# Patient Record
Sex: FEMALE | Race: WHITE | NOT HISPANIC OR LATINO | Employment: OTHER | URBAN - METROPOLITAN AREA
[De-identification: names, ages, dates, MRNs, and addresses within clinical notes are randomized per-mention and may not be internally consistent; named-entity substitution may affect disease eponyms.]

---

## 2017-01-09 ENCOUNTER — TELEPHONE (OUTPATIENT)
Dept: MEDICAL GROUP | Facility: CLINIC | Age: 62
End: 2017-01-09

## 2017-01-09 DIAGNOSIS — N39.0 URINARY TRACT INFECTION WITHOUT HEMATURIA, SITE UNSPECIFIED: ICD-10-CM

## 2017-01-09 NOTE — TELEPHONE ENCOUNTER
Patient at [facility] asking for an order for UA,  Patient was advise that order will be placed in between patient and will be notified when order is in system.  Order already in, please sign.  Thank you

## 2017-01-10 ENCOUNTER — HOSPITAL ENCOUNTER (OUTPATIENT)
Facility: MEDICAL CENTER | Age: 62
End: 2017-01-10
Attending: INTERNAL MEDICINE
Payer: COMMERCIAL

## 2017-01-10 DIAGNOSIS — N39.0 URINARY TRACT INFECTION WITHOUT HEMATURIA, SITE UNSPECIFIED: ICD-10-CM

## 2017-01-10 LAB
APPEARANCE UR: ABNORMAL
BACTERIA #/AREA URNS HPF: ABNORMAL /HPF
BILIRUB UR QL STRIP.AUTO: NEGATIVE
COLOR UR AUTO: COLORLESS
CULTURE IF INDICATED INDCX: YES UA CULTURE
EPITHELIAL CELLS 1715: ABNORMAL /HPF
GLUCOSE UR STRIP.AUTO-MCNC: NEGATIVE MG/DL
KETONES UR STRIP.AUTO-MCNC: NEGATIVE MG/DL
LEUKOCYTE ESTERASE UR QL STRIP.AUTO: ABNORMAL
MICRO URNS: ABNORMAL
NITRITE UR QL STRIP.AUTO: POSITIVE
PH UR: 7 [PH]
PROT UR QL STRIP: NEGATIVE MG/DL
RBC #/AREA URNS HPF: ABNORMAL /HPF
RBC UR QL AUTO: NEGATIVE
SP GR UR STRIP.AUTO: 1.01
WBC #/AREA URNS HPF: ABNORMAL /HPF

## 2017-01-10 PROCEDURE — 81001 URINALYSIS AUTO W/SCOPE: CPT

## 2017-01-10 PROCEDURE — 87077 CULTURE AEROBIC IDENTIFY: CPT

## 2017-01-10 PROCEDURE — 87186 SC STD MICRODIL/AGAR DIL: CPT

## 2017-01-10 PROCEDURE — 87086 URINE CULTURE/COLONY COUNT: CPT

## 2017-01-11 ENCOUNTER — TELEPHONE (OUTPATIENT)
Dept: MEDICAL GROUP | Facility: CLINIC | Age: 62
End: 2017-01-11

## 2017-01-11 DIAGNOSIS — N39.0 URINARY TRACT INFECTION, SITE UNSPECIFIED: ICD-10-CM

## 2017-01-11 RX ORDER — NITROFURANTOIN 25; 75 MG/1; MG/1
100 CAPSULE ORAL 2 TIMES DAILY
Qty: 10 CAP | Refills: 0 | Status: SHIPPED | OUTPATIENT
Start: 2017-01-11 | End: 2017-04-24

## 2017-01-11 RX ORDER — NITROFURANTOIN 25; 75 MG/1; MG/1
100 CAPSULE ORAL 2 TIMES DAILY
Qty: 10 CAP | Refills: 0 | Status: SHIPPED | OUTPATIENT
Start: 2017-01-11 | End: 2017-01-11 | Stop reason: SDUPTHER

## 2017-01-11 NOTE — TELEPHONE ENCOUNTER
Patient notified, patient asked to please re-send Rx to her new pharmacy already in system, [facility] in [place].  Thank you!

## 2017-01-11 NOTE — TELEPHONE ENCOUNTER
----- Message from Tomás Salazar D.O. sent at 1/11/2017  8:07 AM PST -----  Viktoria:  Please call Agatha tell her she has a likely UTI antibiotics have been sent to her pharmacy!  Regards, Tomás Salazar, DO

## 2017-01-12 LAB
BACTERIA UR CULT: ABNORMAL
SIGNIFICANT IND 70042: ABNORMAL
SOURCE SOURCE: ABNORMAL

## 2017-02-01 RX ORDER — ENALAPRIL MALEATE 20 MG/1
20 TABLET ORAL DAILY
Qty: 30 TAB | Refills: 11 | Status: SHIPPED | OUTPATIENT
Start: 2017-02-01 | End: 2017-02-08 | Stop reason: SDUPTHER

## 2017-02-08 ENCOUNTER — TELEPHONE (OUTPATIENT)
Dept: MEDICAL GROUP | Facility: CLINIC | Age: 62
End: 2017-02-08

## 2017-02-08 RX ORDER — ENALAPRIL MALEATE 20 MG/1
20 TABLET ORAL 2 TIMES DAILY
Qty: 180 TAB | Refills: 3 | Status: SHIPPED | OUTPATIENT
Start: 2017-02-08 | End: 2018-01-11 | Stop reason: SDUPTHER

## 2017-02-08 NOTE — TELEPHONE ENCOUNTER
1. Caller Name: Agatha Brocato Johnson                                          Call Back Number: [phone] (home)        Patient approves a detailed voicemail message: yes    Pt states she was recently prescribed enalapril 20 mg once a day to her local [email or web address] pharmacy. There were a number of things wrong with this. First she would like a 90 day supplies sent to her [facility] in pharmacy instead as it is cheaper. Also, she was not inform of any sig changes. She states she has been taking this medication for quite some time and would like to know if it needs to be corrected or if she is supposed to be taking it less. Please advise    Pt has also changed her pharmacy to [facility] for local scripts as she has been having issues with [facility]. Updated in chart.

## 2017-02-08 NOTE — TELEPHONE ENCOUNTER
Telephone call returned, prescription corrected to 20 mg twice a day #180 and sent the patient's mail-order pharmacy.

## 2017-02-08 NOTE — TELEPHONE ENCOUNTER
"Pharmacy is questioning the enalapril stating \"pt was hoping this to be BID dosing please advise\". Fax received from [facility] pharmacy. Thank you   "

## 2017-02-13 ENCOUNTER — OFFICE VISIT (OUTPATIENT)
Dept: MEDICAL GROUP | Facility: CLINIC | Age: 62
End: 2017-02-13
Payer: COMMERCIAL

## 2017-02-13 VITALS
TEMPERATURE: 97.5 F | OXYGEN SATURATION: 99 % | HEIGHT: 64 IN | WEIGHT: 153 LBS | BODY MASS INDEX: 26.12 KG/M2 | DIASTOLIC BLOOD PRESSURE: 80 MMHG | SYSTOLIC BLOOD PRESSURE: 130 MMHG | RESPIRATION RATE: 16 BRPM | HEART RATE: 72 BPM

## 2017-02-13 DIAGNOSIS — I10 ESSENTIAL HYPERTENSION: ICD-10-CM

## 2017-02-13 DIAGNOSIS — J45.20 RAD (REACTIVE AIRWAY DISEASE), MILD INTERMITTENT, UNCOMPLICATED: ICD-10-CM

## 2017-02-13 DIAGNOSIS — N95.9 MENOPAUSAL DISORDER: ICD-10-CM

## 2017-02-13 DIAGNOSIS — Z12.11 SCREEN FOR COLON CANCER: ICD-10-CM

## 2017-02-13 DIAGNOSIS — F32.A DEPRESSION, UNSPECIFIED DEPRESSION TYPE: ICD-10-CM

## 2017-02-13 DIAGNOSIS — Z00.00 ANNUAL PHYSICAL EXAM: ICD-10-CM

## 2017-02-13 DIAGNOSIS — E78.5 DYSLIPIDEMIA: ICD-10-CM

## 2017-02-13 PROCEDURE — 99396 PREV VISIT EST AGE 40-64: CPT | Performed by: INTERNAL MEDICINE

## 2017-02-13 RX ORDER — SIMVASTATIN 20 MG
20 TABLET ORAL EVERY EVENING
Qty: 90 TAB | Refills: 3 | Status: SHIPPED | OUTPATIENT
Start: 2017-02-13 | End: 2017-03-07 | Stop reason: SDUPTHER

## 2017-02-13 RX ORDER — HYDROCHLOROTHIAZIDE 12.5 MG/1
12.5 TABLET ORAL DAILY
Qty: 90 TAB | Refills: 3 | Status: SHIPPED | OUTPATIENT
Start: 2017-02-13 | End: 2017-03-07 | Stop reason: SDUPTHER

## 2017-02-13 RX ORDER — FLUOXETINE 10 MG/1
10 CAPSULE ORAL DAILY
Qty: 30 CAP | Refills: 3 | Status: SHIPPED | OUTPATIENT
Start: 2017-02-13 | End: 2017-03-07 | Stop reason: SDUPTHER

## 2017-02-13 RX ORDER — AMLODIPINE BESYLATE 2.5 MG/1
2.5 TABLET ORAL DAILY
Qty: 90 TAB | Refills: 3 | Status: SHIPPED | OUTPATIENT
Start: 2017-02-13 | End: 2017-03-07 | Stop reason: SDUPTHER

## 2017-02-13 RX ORDER — ALBUTEROL SULFATE 90 UG/1
2 AEROSOL, METERED RESPIRATORY (INHALATION) EVERY 6 HOURS PRN
Qty: 8.5 G | Refills: 3 | Status: SHIPPED | OUTPATIENT
Start: 2017-02-13 | End: 2017-03-07 | Stop reason: SDUPTHER

## 2017-02-13 ASSESSMENT — PATIENT HEALTH QUESTIONNAIRE - PHQ9: CLINICAL INTERPRETATION OF PHQ2 SCORE: 0

## 2017-02-13 NOTE — MR AVS SNAPSHOT
"        Agatha Haney Lam   2017 8:00 AM   Office Visit   MRN: [id number]    Department:  [facility]   Dept Phone:  [phone]    Description:  Female : 1955   Provider:  Tomás Salazar D.O.           Reason for Visit     Annual Exam Annual no PAP; prescription renewal      Allergies as of 2017     No Known Allergies      You were diagnosed with     Screen for colon cancer   [880183]       Dyslipidemia   [572460]       Essential hypertension   [7543919]       Depression, unspecified depression type   [8084708]       RAD (reactive airway disease), mild intermittent, uncomplicated   [8410081]       Menopausal disorder   [628109]         Vital Signs     Blood Pressure Pulse Temperature Respirations Height Weight    130/80 mmHg 72 36.4 °C (97.5 °F) 16 1.626 m (5' 4\") 69.4 kg (153 lb)    Body Mass Index Oxygen Saturation Breastfeeding? Smoking Status          26.25 kg/m2 99% No Former Smoker        Basic Information     Date Of Birth Sex Race Ethnicity Preferred Language    1955 Female White Non- English      Problem List              ICD-10-CM Priority Class Noted - Resolved    White coat hypertension R03.0   2016 - Present    Essential hypertension I10   2016 - Present    Dyslipidemia E78.5   2016 - Present    Cervicalgia M54.2   2016 - Present    Carpal tunnel syndrome on right G56.01   2016 - Present      Health Maintenance        Date Due Completion Dates    IMM DTaP/Tdap/Td Vaccine (1 - Tdap) 1975 ---    COLONOSCOPY 2006 ---    IMM ZOSTER VACCINE 2016 ---    MAMMOGRAM 2017, 2016, 2016, 2016 (Done)    Override on 2016: Done (in mediA)    PAP SMEAR 2019            Current Immunizations     Influenza Vaccine Quad Inj (Pf) 10/15/2016  8:20 AM      Below and/or attached are the medications your provider expects you to take. Review all of your home medications and newly ordered medications " with your provider and/or pharmacist. Follow medication instructions as directed by your provider and/or pharmacist. Please keep your medication list with you and share with your provider. Update the information when medications are discontinued, doses are changed, or new medications (including over-the-counter products) are added; and carry medication information at all times in the event of emergency situations     Allergies:  No Known Allergies          Medications  Valid as of: February 13, 2017 -  9:29 AM    Generic Name Brand Name Tablet Size Instructions for use    albuterol (PROVENTIL) 2.5 mg/0.5 mL NEBU (Nebu Soln) PROVENTIL 2.5 mg/0.5 mL by Nebulization route ONCE (RT).        Albuterol Sulfate (Aero Soln) albuterol 108 (90 BASE) MCG/ACT Inhale 2 Puffs by mouth every 6 hours as needed for Shortness of Breath.        AmLODIPine Besylate (Tab) NORVASC 2.5 MG Take 1 Tab by mouth every day.        Ascorbic Acid (Tab) ascorbic acid 500 MG Take 500 mg by mouth every day.        Calcium Carbonate Antacid (Chew Tab) TUMS 500 MG Take 500 mg by mouth 2 times a day.        Cholecalciferol   Take  by mouth.        Cranberry   Take  by mouth.        Enalapril Maleate (Tab) VASOTEC 20 MG Take 1 Tab by mouth 2 times a day.        FLUoxetine HCl (Cap) PROZAC 10 MG Take 1 Cap by mouth every day.        Gabapentin (Cap) NEURONTIN 300 MG Take 2 Caps by mouth every 6 hours.        HydroCHLOROthiazide (Tab) HYDRODIURIL 12.5 MG Take 1 Tab by mouth every day.        Mometasone Furo-Formoterol Fum (Aerosol) Mometasone Furo-Formoterol Fum 100-5 MCG/ACT Inhale 2 Puffs by mouth 2 times a day as needed.        Nitrofurantoin Monohyd Macro (Cap) MACROBID 100 MG Take 1 Cap by mouth 2 times a day.        NON SPECIFIED   Indications: multivitamin        Oxycodone-Acetaminophen (Tab) PERCOCET 5-325 MG Take 1-2 Tabs by mouth every four hours as needed.        Simvastatin (Tab) ZOCOR 20 MG Take 1 Tab by mouth every evening.        .                Medicines prescribed today were sent to:     [facility] - [place], [place] - [address] [address] [facility]    [address] [address]    Phone: [phone] Fax: [phone]    Open 24 Hours?: No    [facility] - [place], [place] - [address] [place] [address]    Phone: [phone] Fax: [phone]    Open 24 Hours?: No      Medication refill instructions:       If your prescription bottle indicates you have medication refills left, it is not necessary to call your provider’s office. Please contact your pharmacy and they will refill your medication.    If your prescription bottle indicates you do not have any refills left, you may request refills at any time through one of the following ways: The online [employer] system (except Urgent Care), by calling your provider’s office, or by asking your pharmacy to contact your provider’s office with a refill request. Medication refills are processed only during regular business hours and may not be available until the next business day. Your provider may request additional information or to have a follow-up visit with you prior to refilling your medication.   *Please Note: Medication refills are assigned a new Rx number when refilled electronically. Your pharmacy may indicate that no refills were authorized even though a new prescription for the same medication is available at the pharmacy. Please request the medicine by name with the pharmacy before contacting your provider for a refill.        Your To Do List     Future Labs/Procedures Complete By Expires    CBC WITH DIFFERENTIAL  As directed 2/14/2018    COMP METABOLIC PANEL  As directed 2/13/2018    CT-CARDIAC SCORING  As directed 2/13/2018    LIPID PROFILE  As directed 2/14/2018      Referral     A referral request has been sent to our patient care coordination department. Please allow 3-5 business days for us to process this request and contact you  either by phone or mail. If you do not hear from us by the 5th business day, please call us at [phone].           [employer] Access Code: Activation code not generated  Current MobileVeda Status: Active

## 2017-02-14 NOTE — PROGRESS NOTES
CC: Agatha Fritz is a 61 y.o. female is suffering from   Chief Complaint   Patient presents with   • Annual Exam     Annual no PAP; prescription renewal         SUBJECTIVE:  1. Annual physical exam  Patient's here for annual physical examination denies any significant medical changes over the past one year. Patient is otherwise in very good physical condition.     2. Dyslipidemia  Patient with a history of dyslipidemia will order additional testing, I've suggested patient undergo cardiac calcium scoring.     3. Essential hypertension  Patient with a history of essential hypertension refills of medication ordered    4. Depression, unspecified depression type  Patient has done well on medication, Prozac reordered.     5. RAD (reactive airway disease), mild intermittent, uncomplicated  Patient with a history of reactive airway disease which is intermittent medications also refilled    6. Menopausal disorder  Mammogram ordered  - MA-SCREEN MAMMO W/CAD-BILAT  - MA-SCREEN MAMMO W/CAD-BILAT    7. Screen for colon cancer  Screening for colon cancer is also been ordered        Past social, family, history:   Social History   Substance Use Topics   • Smoking status: Former Smoker -- 1.00 packs/day for 15 years     Types: Cigarettes     Quit date: 01/01/1990   • Smokeless tobacco: Never Used   • Alcohol Use: No         MEDICATIONS:    Current outpatient prescriptions:   •  amlodipine (NORVASC) 2.5 MG Tab, Take 1 Tab by mouth every day., Disp: 90 Tab, Rfl: 3  •  simvastatin (ZOCOR) 20 MG Tab, Take 1 Tab by mouth every evening., Disp: 90 Tab, Rfl: 3  •  hydrochlorothiazide (HYDRODIURIL) 12.5 MG tablet, Take 1 Tab by mouth every day., Disp: 90 Tab, Rfl: 3  •  fluoxetine (PROZAC) 10 MG Cap, Take 1 Cap by mouth every day., Disp: 30 Cap, Rfl: 3  •  albuterol 108 (90 BASE) MCG/ACT Aero Soln inhalation aerosol, Inhale 2 Puffs by mouth every 6 hours as needed for Shortness of Breath., Disp: 8.5 g, Rfl: 3  •  Mometasone  Furo-Formoterol Fum (DULERA) 100-5 MCG/ACT Aerosol, Inhale 2 Puffs by mouth 2 times a day as needed., Disp: 1 Inhaler, Rfl: 3  •  enalapril (VASOTEC) 20 MG tablet, Take 1 Tab by mouth 2 times a day., Disp: 180 Tab, Rfl: 3  •  calcium carbonate (TUMS) 500 MG Chew Tab, Take 500 mg by mouth 2 times a day., Disp: , Rfl:   •  gabapentin (NEURONTIN) 300 MG Cap, Take 2 Caps by mouth every 6 hours., Disp: 720 Cap, Rfl: 3  •  CRANBERRY EXTRACT PO, Take  by mouth., Disp: , Rfl:   •  albuterol (PROVENTIL) 2.5 mg/0.5 mL NEBU, by Nebulization route ONCE (RT)., Disp: , Rfl:   •  nitrofurantoin monohydr macro (MACROBID) 100 MG Cap, Take 1 Cap by mouth 2 times a day. (Patient not taking: Reported on 2/13/2017), Disp: 10 Cap, Rfl: 0  •  NON SPECIFIED, Indications: multivitamin, Disp: , Rfl:   •  oxycodone-acetaminophen (PERCOCET) 5-325 MG Tab, Take 1-2 Tabs by mouth every four hours as needed. (Patient not taking: Reported on 2/13/2017), Disp: 25 Tab, Rfl: 0  •  ascorbic acid (ASCORBIC ACID) 500 MG Tab, Take 500 mg by mouth every day., Disp: , Rfl:   •  Cholecalciferol (VITAMIN D-3 PO), Take  by mouth., Disp: , Rfl:     PROBLEMS:  Patient Active Problem List    Diagnosis Date Noted   • Carpal tunnel syndrome on right 11/12/2016   • Cervicalgia 09/14/2016   • White coat hypertension 04/04/2016   • Essential hypertension 04/04/2016   • Dyslipidemia 04/04/2016       REVIEW OF SYSTEMS:  General: Patient denies any problems with nausea vomiting fever chills chest pain or tightness.  Head:  No history of strokes seizures severe head trauma or loss of consciousness.   HEENT: No history of any significant vision loss, hearing loss, changes in sense of smell hoarseness  Heart: No chest pain tightness squeezing.   Lungs: No recurrent asthma bronchitis pneumonia.   Gastrointestinal: No history of black or bloody stools or  Constipation colonoscopy was done.  Genitourinary:  No increased frequency urgency pain and burning with  "urination  Musculoskeletal: No joint pain or discomfort.   Skin: No skin changes      PHYSICAL EXAM   Constitutional: Alert, cooperative, not in acute distress.  Cardiovascular:  Rate Rhythm is regular without murmurs rubs clicks.     Thorax & Lungs: Clear to auscultation, no wheezing, rhonchi, or rales  HENT: Normocephalic, Atraumatic.  Eyes: PERRLA, EOMI, Conjunctiva normal.   Neck: Trachia is midline no swelling of the thyroid.   Lymphatic: No lymphadenopathy noted.   Abdomin: Soft non-tender, no rebound, no guarding.   Neurologic: Alert & oriented x 3, cranial nerves II through XII are intact, Normal motor function, Normal sensory function, No focal deficits noted.   Psychiatric: Affect normal, Judgment normal, Mood normal.     VITAL SIGNS:/80 mmHg  Pulse 72  Temp(Src) 36.4 °C (97.5 °F)  Resp 16  Ht 1.626 m (5' 4\")  Wt 69.4 kg (153 lb)  BMI 26.25 kg/m2  SpO2 99%  Breastfeeding? No    Labs: Reviewed    Assessment:                                                     Plan:    1. Annual physical exam  Patient in very good physical condition no change in medical therapy    2. Dyslipidemia  CT cardiac scoring ordered patient is to continue on Zocor 20 mg  - CT-CARDIAC SCORING; Future  - simvastatin (ZOCOR) 20 MG Tab; Take 1 Tab by mouth every evening.  Dispense: 90 Tab; Refill: 3  - CBC WITH DIFFERENTIAL; Future  - LIPID PROFILE; Future    3. Essential hypertension  Continue Norvasc hydro-chlorothiazide  - amlodipine (NORVASC) 2.5 MG Tab; Take 1 Tab by mouth every day.  Dispense: 90 Tab; Refill: 3  - hydrochlorothiazide (HYDRODIURIL) 12.5 MG tablet; Take 1 Tab by mouth every day.  Dispense: 90 Tab; Refill: 3    4. Depression, unspecified depression type  Continue Prozac  - fluoxetine (PROZAC) 10 MG Cap; Take 1 Cap by mouth every day.  Dispense: 30 Cap; Refill: 3    5. RAD (reactive airway disease), mild intermittent, uncomplicated  Medication refilled  - albuterol 108 (90 BASE) MCG/ACT Aero Soln " inhalation aerosol; Inhale 2 Puffs by mouth every 6 hours as needed for Shortness of Breath.  Dispense: 8.5 g; Refill: 3  - Mometasone Furo-Formoterol Fum (DULERA) 100-5 MCG/ACT Aerosol; Inhale 2 Puffs by mouth 2 times a day as needed.  Dispense: 1 Inhaler; Refill: 3  - COMP METABOLIC PANEL; Future    6. Menopausal disorder  Referral written to obstetrics  - REFERRAL TO OB/GYN  - MA-SCREEN MAMMO W/CAD-BILAT; Standing  - MA-SCREEN MAMMO W/CAD-BILAT    7. Screen for colon cancer  Referral written to gastroenterology  - REFERRAL TO GI FOR COLONOSCOPY

## 2017-02-23 ENCOUNTER — HOSPITAL ENCOUNTER (OUTPATIENT)
Dept: RADIOLOGY | Facility: MEDICAL CENTER | Age: 62
End: 2017-02-23
Attending: INTERNAL MEDICINE
Payer: COMMERCIAL

## 2017-02-23 DIAGNOSIS — E78.5 DYSLIPIDEMIA: ICD-10-CM

## 2017-02-23 PROCEDURE — 4410556 CT-CARDIAC SCORING

## 2017-03-07 DIAGNOSIS — I10 ESSENTIAL HYPERTENSION: ICD-10-CM

## 2017-03-07 DIAGNOSIS — F32.A DEPRESSION, UNSPECIFIED DEPRESSION TYPE: ICD-10-CM

## 2017-03-07 DIAGNOSIS — E78.5 DYSLIPIDEMIA: ICD-10-CM

## 2017-03-07 DIAGNOSIS — J45.20 RAD (REACTIVE AIRWAY DISEASE), MILD INTERMITTENT, UNCOMPLICATED: ICD-10-CM

## 2017-03-07 RX ORDER — HYDROCHLOROTHIAZIDE 12.5 MG/1
12.5 TABLET ORAL DAILY
Qty: 90 TAB | Refills: 3 | Status: SHIPPED | OUTPATIENT
Start: 2017-03-07 | End: 2018-07-26 | Stop reason: SDUPTHER

## 2017-03-07 RX ORDER — ALBUTEROL SULFATE 90 UG/1
2 AEROSOL, METERED RESPIRATORY (INHALATION) EVERY 6 HOURS PRN
Qty: 8.5 G | Refills: 3 | Status: SHIPPED | OUTPATIENT
Start: 2017-03-07 | End: 2019-01-30 | Stop reason: SDUPTHER

## 2017-03-07 RX ORDER — SIMVASTATIN 20 MG
20 TABLET ORAL EVERY EVENING
Qty: 90 TAB | Refills: 3 | Status: SHIPPED | OUTPATIENT
Start: 2017-03-07 | End: 2017-06-01 | Stop reason: SDUPTHER

## 2017-03-07 RX ORDER — FLUOXETINE 10 MG/1
10 CAPSULE ORAL DAILY
Qty: 30 CAP | Refills: 3 | Status: SHIPPED | OUTPATIENT
Start: 2017-03-07 | End: 2018-02-14 | Stop reason: SDUPTHER

## 2017-03-07 RX ORDER — AMLODIPINE BESYLATE 2.5 MG/1
2.5 TABLET ORAL DAILY
Qty: 90 TAB | Refills: 3 | Status: SHIPPED | OUTPATIENT
Start: 2017-03-07 | End: 2017-05-23 | Stop reason: SDUPTHER

## 2017-03-09 DIAGNOSIS — Z12.11 SCREEN FOR COLON CANCER: ICD-10-CM

## 2017-04-17 ENCOUNTER — HOSPITAL ENCOUNTER (OUTPATIENT)
Dept: RADIOLOGY | Facility: MEDICAL CENTER | Age: 62
End: 2017-04-17
Attending: INTERNAL MEDICINE
Payer: COMMERCIAL

## 2017-04-17 DIAGNOSIS — Z12.31 VISIT FOR SCREENING MAMMOGRAM: ICD-10-CM

## 2017-04-17 PROCEDURE — 77063 BREAST TOMOSYNTHESIS BI: CPT

## 2017-04-18 ENCOUNTER — GYNECOLOGY VISIT (OUTPATIENT)
Dept: OBGYN | Facility: CLINIC | Age: 62
End: 2017-04-18
Payer: COMMERCIAL

## 2017-04-18 ENCOUNTER — HOSPITAL ENCOUNTER (OUTPATIENT)
Facility: MEDICAL CENTER | Age: 62
End: 2017-04-18
Attending: OBSTETRICS & GYNECOLOGY
Payer: COMMERCIAL

## 2017-04-18 VITALS
WEIGHT: 163.4 LBS | BODY MASS INDEX: 27.9 KG/M2 | HEIGHT: 64 IN | SYSTOLIC BLOOD PRESSURE: 124 MMHG | DIASTOLIC BLOOD PRESSURE: 76 MMHG

## 2017-04-18 DIAGNOSIS — Z11.51 SCREENING FOR HUMAN PAPILLOMAVIRUS: ICD-10-CM

## 2017-04-18 DIAGNOSIS — Z12.4 SCREENING FOR MALIGNANT NEOPLASM OF CERVIX: ICD-10-CM

## 2017-04-18 DIAGNOSIS — Z01.419 WELL FEMALE EXAM WITH ROUTINE GYNECOLOGICAL EXAM: ICD-10-CM

## 2017-04-18 PROCEDURE — 88175 CYTOPATH C/V AUTO FLUID REDO: CPT

## 2017-04-18 PROCEDURE — 99396 PREV VISIT EST AGE 40-64: CPT | Performed by: OBSTETRICS & GYNECOLOGY

## 2017-04-18 PROCEDURE — 87624 HPV HI-RISK TYP POOLED RSLT: CPT

## 2017-04-18 NOTE — PROGRESS NOTES
" Agatha Fritz61 y.o.  female presents for Annual Well Woman Exam.   Patient is currently without complaints. She also denies pelvic pain or pressure patient denies any vaginal bleeding, she states she occasionally has hot flashes. She occasionally has dyspareunia which she uses KY jelly with good effect.      ROS: Patient is feeling well. No dyspnea or chest pain on exertion. No Abdominal pain, change in bowel habits, black or bloody stools. No urinary sx. GYN ROS:no vaginal bleeding, no discharge or pelvic pain, no hot flashes. Denies breast tenderness, mass, discharge, changes in size or contour, or abnormal cyclic discomfort. No neurological complaints.  Past Medical History   Diagnosis Date   • Bladder infection    • Dyslipidemia 2016   • Urinary bladder disorder      \"prone to UTI's\"   • Asthma      seasonal-uses inhalers in the winter   • Bronchitis    • Pain 2016     right hand   • Psychiatric problem 2016     depression   • Arthritis      neck, bilat knees   • Hypertension    • White coat hypertension 2016   • Essential hypertension 2016     None  Allergy:   Review of patient's allergies indicates no known allergies.    LMP:       No LMP recorded. Patient is postmenopausal. .     Menstrual History: postmenopausal  Contraceptive Method:none    Pap history, the patient denies abnormal Pap smears and denies   sexually transmitted diseases    Mammo: yesterday     Objective : The patient appears well, alert and oriented x 3, in no acute distress.  Blood pressure 124/76, height 1.626 m (5' 4\"), weight 74.118 kg (163 lb 6.4 oz), not currently breastfeeding.  HEENT Exam: EOMI, TARIQ, no adenopathy or thyromegaly.  Lungs: Clear to Auscultation   Cor: S1 and S2 normal, no murmurs, or rubs   Abdomen: Soft without tenderness, guarding mass or organomegaly.  Extremities: No edema, pulses equal  Neurological: Normal No focal signs  Breast Exam:Inspection negative. No nipple " discharge or bleeding. No masses or nodularity palpable  Pelvic: External genitalia,urethral meatus, urethra, bladder and vagina normal. Cervix, uterus and adnexa intact and normal.  Anus and perineum normal. Bimanual and rectovaginal without masses or tenderness.    Lab:No results found for this or any previous visit (from the past 336 hour(s)).    Assessment:  well woman    Plan:mammogram  pap smear  Self Breast Exams  Contraception:none  Follow-up in 2 years

## 2017-04-18 NOTE — MR AVS SNAPSHOT
"        Agatha Fritz   2017 3:30 PM   Gynecology Visit   MRN: [id number]    Department:  [facility]   Dept Phone:  [phone]    Description:  Female : 1955   Provider:  Bhavna Rich M.D.           Reason for Visit     Gynecologic Exam           Allergies as of 2017     No Known Allergies      You were diagnosed with     Well female exam with routine gynecological exam   [668640]       Screening for malignant neoplasm of cervix   [130257]       Screening for human papillomavirus   [625461]         Vital Signs     Blood Pressure Height Weight Body Mass Index Breastfeeding? Smoking Status    124/76 mmHg 1.626 m (5' 4\") 74.118 kg (163 lb 6.4 oz) 28.03 kg/m2 No Former Smoker      Basic Information     Date Of Birth Sex Race Ethnicity Preferred Language    1955 Female White Non- English      Problem List              ICD-10-CM Priority Class Noted - Resolved    White coat hypertension R03.0   2016 - Present    Essential hypertension I10   2016 - Present    Dyslipidemia E78.5   2016 - Present    Cervicalgia M54.2   2016 - Present    Carpal tunnel syndrome on right G56.01   2016 - Present      Health Maintenance        Date Due Completion Dates    IMM DTaP/Tdap/Td Vaccine (1 - Tdap) 1975 ---    COLONOSCOPY 2006 ---    IMM ZOSTER VACCINE 2016 ---    MAMMOGRAM 2018, 2016, 2016 (Done)    Override on 2016: Done (in mediA)    PAP SMEAR 2019            Current Immunizations     Influenza Vaccine Quad Inj (Pf) 10/15/2016  8:20 AM      Below and/or attached are the medications your provider expects you to take. Review all of your home medications and newly ordered medications with your provider and/or pharmacist. Follow medication instructions as directed by your provider and/or pharmacist. Please keep your medication list with you and share with your provider. Update the information when " medications are discontinued, doses are changed, or new medications (including over-the-counter products) are added; and carry medication information at all times in the event of emergency situations     Allergies:  No Known Allergies          Medications  Valid as of: April 18, 2017 -  5:08 PM    Generic Name Brand Name Tablet Size Instructions for use    albuterol (PROVENTIL) 2.5 mg/0.5 mL NEBU (Nebu Soln) PROVENTIL 2.5 mg/0.5 mL by Nebulization route ONCE (RT).        Albuterol Sulfate (Aero Soln) albuterol 108 (90 BASE) MCG/ACT Inhale 2 Puffs by mouth every 6 hours as needed for Shortness of Breath.        AmLODIPine Besylate (Tab) NORVASC 2.5 MG Take 1 Tab by mouth every day.        Ascorbic Acid (Tab) ascorbic acid 500 MG Take 500 mg by mouth every day.        Calcium Carbonate Antacid (Chew Tab) TUMS 500 MG Take 500 mg by mouth 2 times a day.        Cholecalciferol   Take  by mouth.        Cranberry   Take  by mouth.        Enalapril Maleate (Tab) VASOTEC 20 MG Take 1 Tab by mouth 2 times a day.        FLUoxetine HCl (Cap) PROZAC 10 MG Take 1 Cap by mouth every day.        Gabapentin (Cap) NEURONTIN 300 MG Take 2 Caps by mouth every 6 hours.        HydroCHLOROthiazide (Tab) HYDRODIURIL 12.5 MG Take 1 Tab by mouth every day.        Mometasone Furo-Formoterol Fum (Aerosol) Mometasone Furo-Formoterol Fum 100-5 MCG/ACT Inhale 2 Puffs by mouth 2 times a day as needed.        Nitrofurantoin Monohyd Macro (Cap) MACROBID 100 MG Take 1 Cap by mouth 2 times a day.        NON SPECIFIED   Indications: multivitamin        Oxycodone-Acetaminophen (Tab) PERCOCET 5-325 MG Take 1-2 Tabs by mouth every four hours as needed.        Simvastatin (Tab) ZOCOR 20 MG Take 1 Tab by mouth every evening.        .                 Medicines prescribed today were sent to:     [facility] - [place], [place] - [address] [facility] [address] [facility]    [address] [facility] [address]    Phone: [phone] Fax: [phone]    Open 24 Hours?: No    [facility] [address] [place] - [address] [address]    Phone: [phone] Fax: [phone]    Open 24 Hours?: No      Medication refill instructions:       If your prescription bottle indicates you have medication refills left, it is not necessary to call your provider’s office. Please contact your pharmacy and they will refill your medication.    If your prescription bottle indicates you do not have any refills left, you may request refills at any time through one of the following ways: The online [employer] system (except Urgent Care), by calling your provider’s office, or by asking your pharmacy to contact your provider’s office with a refill request. Medication refills are processed only during regular business hours and may not be available until the next business day. Your provider may request additional information or to have a follow-up visit with you prior to refilling your medication.   *Please Note: Medication refills are assigned a new Rx number when refilled electronically. Your pharmacy may indicate that no refills were authorized even though a new prescription for the same medication is available at the pharmacy. Please request the medicine by name with the pharmacy before contacting your provider for a refill.        Your To Do List     Future Labs/Procedures Complete By Expires    THINPREP PAP WITH HPV  As directed 4/18/2018         Big [employer] Access Code: Activation code not generated  Current Big Screen [employer] Status: Active

## 2017-04-19 LAB
CYTOLOGY REG CYTOL: NORMAL
HPV HR 12 DNA CVX QL NAA+PROBE: NEGATIVE
HPV16 DNA SPEC QL NAA+PROBE: NEGATIVE
HPV18 DNA SPEC QL NAA+PROBE: NEGATIVE
SPECIMEN SOURCE: NORMAL

## 2017-04-24 ENCOUNTER — OFFICE VISIT (OUTPATIENT)
Dept: URGENT CARE | Facility: CLINIC | Age: 62
End: 2017-04-24
Payer: COMMERCIAL

## 2017-04-24 VITALS
HEIGHT: 64 IN | WEIGHT: 163 LBS | BODY MASS INDEX: 27.83 KG/M2 | RESPIRATION RATE: 14 BRPM | OXYGEN SATURATION: 97 % | HEART RATE: 84 BPM | SYSTOLIC BLOOD PRESSURE: 132 MMHG | DIASTOLIC BLOOD PRESSURE: 74 MMHG | TEMPERATURE: 99 F

## 2017-04-24 DIAGNOSIS — J01.00 ACUTE MAXILLARY SINUSITIS, RECURRENCE NOT SPECIFIED: ICD-10-CM

## 2017-04-24 DIAGNOSIS — J32.0 MAXILLARY SINUSITIS, UNSPECIFIED CHRONICITY: ICD-10-CM

## 2017-04-24 PROCEDURE — 99213 OFFICE O/P EST LOW 20 MIN: CPT | Performed by: EMERGENCY MEDICINE

## 2017-04-24 RX ORDER — AMOXICILLIN AND CLAVULANATE POTASSIUM 875; 125 MG/1; MG/1
1 TABLET, FILM COATED ORAL 2 TIMES DAILY
Qty: 14 TAB | Refills: 0 | Status: SHIPPED | OUTPATIENT
Start: 2017-04-24 | End: 2017-04-24

## 2017-04-24 RX ORDER — AMOXICILLIN AND CLAVULANATE POTASSIUM 875; 125 MG/1; MG/1
1 TABLET, FILM COATED ORAL 2 TIMES DAILY
Qty: 14 TAB | Refills: 0 | Status: SHIPPED | OUTPATIENT
Start: 2017-04-24 | End: 2017-10-25

## 2017-04-24 ASSESSMENT — ENCOUNTER SYMPTOMS
FEVER: 0
EYE DISCHARGE: 0
CHILLS: 0
SENSORY CHANGE: 0
COUGH: 0
VOMITING: 0
NECK PAIN: 0
NAUSEA: 0
EYE PAIN: 0
SINUS PAIN: 1
SPEECH CHANGE: 0
SPUTUM PRODUCTION: 0
HEADACHES: 0
ABDOMINAL PAIN: 0
PALPITATIONS: 0

## 2017-04-24 ASSESSMENT — LIFESTYLE VARIABLES: SUBSTANCE_ABUSE: 0

## 2017-04-24 NOTE — MR AVS SNAPSHOT
"        Agatha Fritz   2017 5:00 PM   Office Visit   MRN: [id number]    Department:  [facility]   Dept Phone:  [phone]    Description:  Female : 1955   Provider:  CANDICE Aviles M.D.           Reason for Visit     URI uri symptoms x 9 days .       Allergies as of 2017     No Known Allergies      You were diagnosed with     Acute maxillary sinusitis, recurrence not specified   [[id number]]         Vital Signs     Blood Pressure Pulse Temperature Respirations Height Weight    132/74 mmHg 84 37.2 °C (99 °F) 14 1.626 m (5' 4\") 73.936 kg (163 lb)    Body Mass Index Oxygen Saturation Smoking Status             27.97 kg/m2 97% Former Smoker         Basic Information     Date Of Birth Sex Race Ethnicity Preferred Language    1955 Female White Non- English      Problem List              ICD-10-CM Priority Class Noted - Resolved    White coat hypertension R03.0   2016 - Present    Essential hypertension I10   2016 - Present    Dyslipidemia E78.5   2016 - Present    Cervicalgia M54.2   2016 - Present    Carpal tunnel syndrome on right G56.01   2016 - Present      Health Maintenance        Date Due Completion Dates    IMM DTaP/Tdap/Td Vaccine (1 - Tdap) 1975 ---    COLONOSCOPY 2006 ---    IMM ZOSTER VACCINE 2016 ---    MAMMOGRAM 2018, 2016, 2016 (Done)    Override on 2016: Done (in mediA)    PAP SMEAR 2020, 2016            Current Immunizations     Influenza Vaccine Quad Inj (Pf) 10/15/2016  8:20 AM      Below and/or attached are the medications your provider expects you to take. Review all of your home medications and newly ordered medications with your provider and/or pharmacist. Follow medication instructions as directed by your provider and/or pharmacist. Please keep your medication list with you and share with your provider. Update the information when medications are discontinued, doses are " changed, or new medications (including over-the-counter products) are added; and carry medication information at all times in the event of emergency situations     Allergies:  No Known Allergies          Medications  Valid as of: April 24, 2017 -  5:31 PM    Generic Name Brand Name Tablet Size Instructions for use    albuterol (PROVENTIL) 2.5 mg/0.5 mL NEBU (Nebu Soln) PROVENTIL 2.5 mg/0.5 mL by Nebulization route ONCE (RT).        Albuterol Sulfate (Aero Soln) albuterol 108 (90 BASE) MCG/ACT Inhale 2 Puffs by mouth every 6 hours as needed for Shortness of Breath.        AmLODIPine Besylate (Tab) NORVASC 2.5 MG Take 1 Tab by mouth every day.        Amoxicillin-Pot Clavulanate (Tab) AUGMENTIN 875-125 MG Take 1 Tab by mouth 2 times a day for 7 days.        Ascorbic Acid (Tab) ascorbic acid 500 MG Take 500 mg by mouth every day.        Calcium Carbonate Antacid (Chew Tab) TUMS 500 MG Take 500 mg by mouth 2 times a day.        Cholecalciferol   Take  by mouth.        Cranberry   Take  by mouth.        Enalapril Maleate (Tab) VASOTEC 20 MG Take 1 Tab by mouth 2 times a day.        FLUoxetine HCl (Cap) PROZAC 10 MG Take 1 Cap by mouth every day.        Gabapentin (Cap) NEURONTIN 300 MG Take 2 Caps by mouth every 6 hours.        HydroCHLOROthiazide (Tab) HYDRODIURIL 12.5 MG Take 1 Tab by mouth every day.        Mometasone Furo-Formoterol Fum (Aerosol) Mometasone Furo-Formoterol Fum 100-5 MCG/ACT Inhale 2 Puffs by mouth 2 times a day as needed.        Multiple Vitamin   Take  by mouth.        Nitrofurantoin Monohyd Macro (Cap) MACROBID 100 MG Take 1 Cap by mouth 2 times a day.        NON SPECIFIED   Indications: multivitamin        Oxycodone-Acetaminophen (Tab) PERCOCET 5-325 MG Take 1-2 Tabs by mouth every four hours as needed.        Simvastatin (Tab) ZOCOR 20 MG Take 1 Tab by mouth every evening.        .                 Medicines prescribed today were sent to:     [facility] [place], [place] - [address]      [address] [place] [address]    Phone: [phone] Fax: [phone]    Open 24 Hours?: No    [facility] - [place], [place] - [address]    [address] [address]    Phone: [phone] Fax: [phone]    Open 24 Hours?: No      Medication refill instructions:       If your prescription bottle indicates you have medication refills left, it is not necessary to call your provider’s office. Please contact your pharmacy and they will refill your medication.    If your prescription bottle indicates you do not have any refills left, you may request refills at any time through one of the following ways: The online [employer] system (except Urgent Care), by calling your provider’s office, or by asking your pharmacy to contact your provider’s office with a refill request. Medication refills are processed only during regular business hours and may not be available until the next business day. Your provider may request additional information or to have a follow-up visit with you prior to refilling your medication.   *Please Note: Medication refills are assigned a new Rx number when refilled electronically. Your pharmacy may indicate that no refills were authorized even though a new prescription for the same medication is available at the pharmacy. Please request the medicine by name with the pharmacy before contacting your provider for a refill.           [employer] Access Code: Activation code not generated  Current [employer] Status: Active

## 2017-04-25 NOTE — PROGRESS NOTES
"Subjective:      Agatha Fritz is a 61 y.o. female who presents with URI            URI   This is a new problem. The current episode started 1 to 4 weeks ago. The problem has been gradually worsening. There has been no fever. Associated symptoms include congestion (left  maxillary sinusitis) and sinus pain. Pertinent negatives include no abdominal pain, chest pain, coughing, headaches, nausea, neck pain, rash or vomiting.   No Known Allergies  Social History     Social History   • Marital Status:      Spouse Name: N/A   • Number of Children: N/A   • Years of Education: N/A     Occupational History   • registered nurse      [employer]     Social History Main Topics   • Smoking status: Former Smoker -- 1.00 packs/day for 15 years     Types: Cigarettes     Quit date: 01/01/1990   • Smokeless tobacco: Never Used   • Alcohol Use: No   • Drug Use: No   • Sexual Activity: Not on file     Other Topics Concern   • Not on file     Social History Narrative      Past Medical History   Diagnosis Date   • Bladder infection    • Dyslipidemia 4/4/2016   • Urinary bladder disorder      \"prone to UTI's\"   • Asthma      seasonal-uses inhalers in the winter   • Bronchitis 2014   • Pain 9/13/2016     right hand   • Psychiatric problem 9/13/2016     depression   • Arthritis      neck, bilat knees   • Hypertension    • White coat hypertension 4/4/2016   • Essential hypertension 4/4/2016     Current Outpatient Prescriptions on File Prior to Visit   Medication Sig Dispense Refill   • amlodipine (NORVASC) 2.5 MG Tab Take 1 Tab by mouth every day. 90 Tab 3   • simvastatin (ZOCOR) 20 MG Tab Take 1 Tab by mouth every evening. 90 Tab 3   • Mometasone Furo-Formoterol Fum (DULERA) 100-5 MCG/ACT Aerosol Inhale 2 Puffs by mouth 2 times a day as needed. 1 Inhaler 3   • enalapril (VASOTEC) 20 MG tablet Take 1 Tab by mouth 2 times a day. 180 Tab 3   • calcium carbonate (TUMS) 500 MG Chew Tab Take 500 mg by mouth 2 times a day.     • gabapentin " "(NEURONTIN) 300 MG Cap Take 2 Caps by mouth every 6 hours. 720 Cap 3   • ascorbic acid (ASCORBIC ACID) 500 MG Tab Take 500 mg by mouth every day.     • CRANBERRY EXTRACT PO Take  by mouth.     • hydrochlorothiazide (HYDRODIURIL) 12.5 MG tablet Take 1 Tab by mouth every day. 90 Tab 3   • fluoxetine (PROZAC) 10 MG Cap Take 1 Cap by mouth every day. 30 Cap 3   • albuterol 108 (90 BASE) MCG/ACT Aero Soln inhalation aerosol Inhale 2 Puffs by mouth every 6 hours as needed for Shortness of Breath. 8.5 g 3   • nitrofurantoin monohydr macro (MACROBID) 100 MG Cap Take 1 Cap by mouth 2 times a day. (Patient not taking: Reported on 2/13/2017) 10 Cap 0   • NON SPECIFIED Indications: multivitamin     • oxycodone-acetaminophen (PERCOCET) 5-325 MG Tab Take 1-2 Tabs by mouth every four hours as needed. (Patient not taking: Reported on 2/13/2017) 25 Tab 0   • Cholecalciferol (VITAMIN D-3 PO) Take  by mouth.     • albuterol (PROVENTIL) 2.5 mg/0.5 mL NEBU by Nebulization route ONCE (RT).       No current facility-administered medications on file prior to visit.     Family History   Problem Relation Age of Onset   • Hypertension Son    • Hypertension Father    • Diabetes Maternal Grandmother      Review of Systems   Constitutional: Negative for fever and chills.   HENT: Positive for congestion (left  maxillary sinusitis).    Eyes: Negative for pain and discharge.   Respiratory: Negative for cough and sputum production.    Cardiovascular: Negative for chest pain and palpitations.   Gastrointestinal: Negative for nausea, vomiting and abdominal pain.   Musculoskeletal: Negative for neck pain.   Skin: Negative for itching and rash.   Neurological: Negative for sensory change, speech change and headaches.   Psychiatric/Behavioral: Negative for substance abuse.          Objective:     /74 mmHg  Pulse 84  Temp(Src) 37.2 °C (99 °F)  Resp 14  Ht 1.626 m (5' 4\")  Wt 73.936 kg (163 lb)  BMI 27.97 kg/m2  SpO2 97%     Physical Exam "   Constitutional: She appears well-developed and well-nourished. No distress.   HENT:   Head: Normocephalic and atraumatic.   Right Ear: External ear normal.   TMs are normal no erythema canals are clear.    Patient is tender over her left maxillary sinus   Eyes: Conjunctivae are normal. Right eye exhibits no discharge. Left eye exhibits no discharge.   Neck: Normal range of motion.   Cardiovascular: Normal rate and regular rhythm.    Pulmonary/Chest: Effort normal. No stridor. No respiratory distress. She has no wheezes.   Musculoskeletal: She exhibits no tenderness.   Lymphadenopathy:     She has no cervical adenopathy.   Skin: Skin is warm and dry. She is not diaphoretic. No erythema.   Psychiatric: She has a normal mood and affect. Her behavior is normal.   Vitals reviewed.              Assessment/Plan:     DX Acute maxillary sinusitis    I am recommending the patient initiate/ continue hydration efforts including the use of a vaporizer/humidifier/ netti pot. I also recommend the pt, initiate Mucinex, patient will avoid decongestants and antihistamines due to her blood pressure. In addition the patient will initiate the prescribed prescription medication/s: Augmentin on a contingency basis.. If the patient's condition exacerbates with worsening dysphagia, shortness of breath, uncontrolled fever, headache or chest pressure he/she will return immediately to the urgent care or go to  the emergency department for further evaluation.    CANDICE Aviles

## 2017-05-23 DIAGNOSIS — I10 ESSENTIAL HYPERTENSION: ICD-10-CM

## 2017-05-23 RX ORDER — AMLODIPINE BESYLATE 2.5 MG/1
2.5 TABLET ORAL DAILY
Qty: 90 TAB | Refills: 3 | Status: SHIPPED | OUTPATIENT
Start: 2017-05-23 | End: 2017-06-01 | Stop reason: SDUPTHER

## 2017-06-01 ENCOUNTER — PATIENT MESSAGE (OUTPATIENT)
Dept: MEDICAL GROUP | Facility: CLINIC | Age: 62
End: 2017-06-01

## 2017-06-01 DIAGNOSIS — E78.5 DYSLIPIDEMIA: ICD-10-CM

## 2017-06-01 DIAGNOSIS — I10 ESSENTIAL HYPERTENSION: ICD-10-CM

## 2017-06-01 RX ORDER — AMLODIPINE BESYLATE 2.5 MG/1
2.5 TABLET ORAL DAILY
Qty: 90 TAB | Refills: 3 | Status: SHIPPED | OUTPATIENT
Start: 2017-06-01 | End: 2017-06-01 | Stop reason: SDUPTHER

## 2017-06-01 RX ORDER — SIMVASTATIN 20 MG
20 TABLET ORAL EVERY EVENING
Qty: 90 TAB | Refills: 3 | Status: SHIPPED | OUTPATIENT
Start: 2017-06-01 | End: 2017-06-01 | Stop reason: SDUPTHER

## 2017-06-01 RX ORDER — AMLODIPINE BESYLATE 2.5 MG/1
2.5 TABLET ORAL DAILY
Qty: 90 TAB | Refills: 3 | Status: SHIPPED | OUTPATIENT
Start: 2017-06-01 | End: 2018-07-26 | Stop reason: SDUPTHER

## 2017-06-01 RX ORDER — SIMVASTATIN 20 MG
20 TABLET ORAL EVERY EVENING
Qty: 90 TAB | Refills: 3 | Status: SHIPPED | OUTPATIENT
Start: 2017-06-01 | End: 2018-01-11 | Stop reason: SDUPTHER

## 2017-09-19 ENCOUNTER — HOSPITAL ENCOUNTER (OUTPATIENT)
Facility: MEDICAL CENTER | Age: 62
End: 2017-09-19
Payer: COMMERCIAL

## 2017-09-19 LAB
ALBUMIN SERPL BCP-MCNC: 4.2 G/DL (ref 3.2–4.9)
ALBUMIN/GLOB SERPL: 1.4 G/DL
ALP SERPL-CCNC: 90 U/L (ref 30–99)
ALT SERPL-CCNC: 21 U/L (ref 2–50)
ANION GAP SERPL CALC-SCNC: 8 MMOL/L (ref 0–11.9)
AST SERPL-CCNC: 30 U/L (ref 12–45)
BDY FAT % MEASURED: 39.2 %
BILIRUB SERPL-MCNC: 0.6 MG/DL (ref 0.1–1.5)
BP DIAS: 84 MMHG
BP SYS: 134 MMHG
BUN SERPL-MCNC: 19 MG/DL (ref 8–22)
CALCIUM SERPL-MCNC: 9.8 MG/DL (ref 8.5–10.5)
CHLORIDE SERPL-SCNC: 103 MMOL/L (ref 96–112)
CHOLEST SERPL-MCNC: 181 MG/DL (ref 100–199)
CO2 SERPL-SCNC: 27 MMOL/L (ref 20–33)
CREAT SERPL-MCNC: 0.68 MG/DL (ref 0.5–1.4)
DIABETES HTDIA: NO
EVENT NAME HTEVT: NORMAL
GFR SERPL CREATININE-BSD FRML MDRD: >60 ML/MIN/1.73 M 2
GLOBULIN SER CALC-MCNC: 3.1 G/DL (ref 1.9–3.5)
GLUCOSE SERPL-MCNC: 87 MG/DL (ref 65–99)
HDLC SERPL-MCNC: 71 MG/DL
HYPERTENSION HTHYP: YES
LDLC SERPL CALC-MCNC: 87 MG/DL
POTASSIUM SERPL-SCNC: 4.2 MMOL/L (ref 3.6–5.5)
PROT SERPL-MCNC: 7.3 G/DL (ref 6–8.2)
SCREENING LOC CITY HTCIT: NORMAL
SCREENING LOC STATE HTSTA: NORMAL
SCREENING LOCATION HTLOC: NORMAL
SODIUM SERPL-SCNC: 138 MMOL/L (ref 135–145)
SUBSCRIBER ID HTSID: NORMAL
TRIGL SERPL-MCNC: 114 MG/DL (ref 0–149)

## 2017-10-25 DIAGNOSIS — Z01.812 PRE-OPERATIVE LABORATORY EXAMINATION: ICD-10-CM

## 2017-10-25 DIAGNOSIS — Z01.810 PRE-OPERATIVE CARDIOVASCULAR EXAMINATION: ICD-10-CM

## 2017-10-25 LAB
ANION GAP SERPL CALC-SCNC: 9 MMOL/L (ref 0–11.9)
APPEARANCE UR: CLEAR
APTT PPP: 28 SEC (ref 24.7–36)
BACTERIA #/AREA URNS HPF: ABNORMAL /HPF
BASOPHILS # BLD AUTO: 0.6 % (ref 0–1.8)
BASOPHILS # BLD: 0.03 K/UL (ref 0–0.12)
BILIRUB UR QL STRIP.AUTO: NEGATIVE
BUN SERPL-MCNC: 17 MG/DL (ref 8–22)
CALCIUM SERPL-MCNC: 9.9 MG/DL (ref 8.5–10.5)
CHLORIDE SERPL-SCNC: 105 MMOL/L (ref 96–112)
CO2 SERPL-SCNC: 23 MMOL/L (ref 20–33)
COLOR UR: YELLOW
CREAT SERPL-MCNC: 0.71 MG/DL (ref 0.5–1.4)
CULTURE IF INDICATED INDCX: YES UA CULTURE
EKG IMPRESSION: NORMAL
EOSINOPHIL # BLD AUTO: 0.17 K/UL (ref 0–0.51)
EOSINOPHIL NFR BLD: 3.2 % (ref 0–6.9)
EPI CELLS #/AREA URNS HPF: NEGATIVE /HPF
ERYTHROCYTE [DISTWIDTH] IN BLOOD BY AUTOMATED COUNT: 44 FL (ref 35.9–50)
GFR SERPL CREATININE-BSD FRML MDRD: >60 ML/MIN/1.73 M 2
GLUCOSE SERPL-MCNC: 88 MG/DL (ref 65–99)
GLUCOSE UR STRIP.AUTO-MCNC: NEGATIVE MG/DL
HCT VFR BLD AUTO: 43.5 % (ref 37–47)
HGB BLD-MCNC: 14.7 G/DL (ref 12–16)
HYALINE CASTS #/AREA URNS LPF: ABNORMAL /LPF
IMM GRANULOCYTES # BLD AUTO: 0.03 K/UL (ref 0–0.11)
IMM GRANULOCYTES NFR BLD AUTO: 0.6 % (ref 0–0.9)
INR PPP: 1.01 (ref 0.87–1.13)
KETONES UR STRIP.AUTO-MCNC: NEGATIVE MG/DL
LEUKOCYTE ESTERASE UR QL STRIP.AUTO: ABNORMAL
LYMPHOCYTES # BLD AUTO: 2.3 K/UL (ref 1–4.8)
LYMPHOCYTES NFR BLD: 43.9 % (ref 22–41)
MCH RBC QN AUTO: 31.3 PG (ref 27–33)
MCHC RBC AUTO-ENTMCNC: 33.8 G/DL (ref 33.6–35)
MCV RBC AUTO: 92.6 FL (ref 81.4–97.8)
MICRO URNS: ABNORMAL
MONOCYTES # BLD AUTO: 0.65 K/UL (ref 0–0.85)
MONOCYTES NFR BLD AUTO: 12.4 % (ref 0–13.4)
NEUTROPHILS # BLD AUTO: 2.06 K/UL (ref 2–7.15)
NEUTROPHILS NFR BLD: 39.3 % (ref 44–72)
NITRITE UR QL STRIP.AUTO: NEGATIVE
NRBC # BLD AUTO: 0 K/UL
NRBC BLD AUTO-RTO: 0 /100 WBC
PH UR STRIP.AUTO: 7 [PH]
PLATELET # BLD AUTO: 281 K/UL (ref 164–446)
PMV BLD AUTO: 9.3 FL (ref 9–12.9)
POTASSIUM SERPL-SCNC: 3.9 MMOL/L (ref 3.6–5.5)
PROT UR QL STRIP: NEGATIVE MG/DL
PROTHROMBIN TIME: 13.6 SEC (ref 12–14.6)
RBC # BLD AUTO: 4.7 M/UL (ref 4.2–5.4)
RBC # URNS HPF: ABNORMAL /HPF
RBC UR QL AUTO: NEGATIVE
SODIUM SERPL-SCNC: 137 MMOL/L (ref 135–145)
SP GR UR STRIP.AUTO: 1.02
UROBILINOGEN UR STRIP.AUTO-MCNC: 0.2 MG/DL
WBC # BLD AUTO: 5.2 K/UL (ref 4.8–10.8)
WBC #/AREA URNS HPF: ABNORMAL /HPF

## 2017-10-25 PROCEDURE — 85730 THROMBOPLASTIN TIME PARTIAL: CPT

## 2017-10-25 PROCEDURE — 85610 PROTHROMBIN TIME: CPT

## 2017-10-25 PROCEDURE — 87077 CULTURE AEROBIC IDENTIFY: CPT

## 2017-10-25 PROCEDURE — 93005 ELECTROCARDIOGRAM TRACING: CPT

## 2017-10-25 PROCEDURE — 81001 URINALYSIS AUTO W/SCOPE: CPT

## 2017-10-25 PROCEDURE — 85025 COMPLETE CBC W/AUTO DIFF WBC: CPT

## 2017-10-25 PROCEDURE — 93010 ELECTROCARDIOGRAM REPORT: CPT | Performed by: INTERNAL MEDICINE

## 2017-10-25 PROCEDURE — 87186 SC STD MICRODIL/AGAR DIL: CPT

## 2017-10-25 PROCEDURE — 36415 COLL VENOUS BLD VENIPUNCTURE: CPT

## 2017-10-25 PROCEDURE — 87086 URINE CULTURE/COLONY COUNT: CPT

## 2017-10-25 PROCEDURE — 80048 BASIC METABOLIC PNL TOTAL CA: CPT

## 2017-10-27 LAB
BACTERIA UR CULT: ABNORMAL
BACTERIA UR CULT: ABNORMAL
SIGNIFICANT IND 70042: ABNORMAL
SITE SITE: ABNORMAL
SOURCE SOURCE: ABNORMAL

## 2017-11-11 ENCOUNTER — HOSPITAL ENCOUNTER (OUTPATIENT)
Facility: MEDICAL CENTER | Age: 62
End: 2017-11-11
Attending: NEUROLOGICAL SURGERY | Admitting: NEUROLOGICAL SURGERY
Payer: COMMERCIAL

## 2017-11-11 VITALS
WEIGHT: 165.12 LBS | BODY MASS INDEX: 28.19 KG/M2 | RESPIRATION RATE: 18 BRPM | OXYGEN SATURATION: 98 % | HEART RATE: 52 BPM | SYSTOLIC BLOOD PRESSURE: 140 MMHG | HEIGHT: 64 IN | TEMPERATURE: 97.5 F | DIASTOLIC BLOOD PRESSURE: 40 MMHG

## 2017-11-11 PROCEDURE — 160048 HCHG OR STATISTICAL LEVEL 1-5: Performed by: NEUROLOGICAL SURGERY

## 2017-11-11 PROCEDURE — 700101 HCHG RX REV CODE 250

## 2017-11-11 PROCEDURE — 700111 HCHG RX REV CODE 636 W/ 250 OVERRIDE (IP)

## 2017-11-11 PROCEDURE — 160009 HCHG ANES TIME/MIN: Performed by: NEUROLOGICAL SURGERY

## 2017-11-11 PROCEDURE — 501838 HCHG SUTURE GENERAL: Performed by: NEUROLOGICAL SURGERY

## 2017-11-11 PROCEDURE — 160002 HCHG RECOVERY MINUTES (STAT): Performed by: NEUROLOGICAL SURGERY

## 2017-11-11 PROCEDURE — 160046 HCHG PACU - 1ST 60 MINS PHASE II: Performed by: NEUROLOGICAL SURGERY

## 2017-11-11 PROCEDURE — 160039 HCHG SURGERY MINUTES - EA ADDL 1 MIN LEVEL 3: Performed by: NEUROLOGICAL SURGERY

## 2017-11-11 PROCEDURE — 160028 HCHG SURGERY MINUTES - 1ST 30 MINS LEVEL 3: Performed by: NEUROLOGICAL SURGERY

## 2017-11-11 PROCEDURE — 160025 RECOVERY II MINUTES (STATS): Performed by: NEUROLOGICAL SURGERY

## 2017-11-11 PROCEDURE — 160035 HCHG PACU - 1ST 60 MINS PHASE I: Performed by: NEUROLOGICAL SURGERY

## 2017-11-11 PROCEDURE — 500881 HCHG PACK, EXTREMITY: Performed by: NEUROLOGICAL SURGERY

## 2017-11-11 PROCEDURE — 160047 HCHG PACU  - EA ADDL 30 MINS PHASE II: Performed by: NEUROLOGICAL SURGERY

## 2017-11-11 PROCEDURE — 500440 HCHG DRESSING, STERILE ROLL (KERLIX): Performed by: NEUROLOGICAL SURGERY

## 2017-11-11 RX ORDER — SODIUM CHLORIDE, SODIUM LACTATE, POTASSIUM CHLORIDE, CALCIUM CHLORIDE 600; 310; 30; 20 MG/100ML; MG/100ML; MG/100ML; MG/100ML
1000 INJECTION, SOLUTION INTRAVENOUS
Status: COMPLETED | OUTPATIENT
Start: 2017-11-11 | End: 2017-11-11

## 2017-11-11 RX ORDER — LIDOCAINE HYDROCHLORIDE 10 MG/ML
INJECTION, SOLUTION INFILTRATION; PERINEURAL
Status: COMPLETED
Start: 2017-11-11 | End: 2017-11-11

## 2017-11-11 RX ORDER — LIDOCAINE HYDROCHLORIDE 10 MG/ML
INJECTION, SOLUTION INFILTRATION; PERINEURAL
Status: DISCONTINUED | OUTPATIENT
Start: 2017-11-11 | End: 2017-11-11 | Stop reason: HOSPADM

## 2017-11-11 RX ADMIN — LIDOCAINE HYDROCHLORIDE 0.5 ML: 10 INJECTION, SOLUTION INFILTRATION; PERINEURAL at 06:33

## 2017-11-11 RX ADMIN — SODIUM CHLORIDE, SODIUM LACTATE, POTASSIUM CHLORIDE, CALCIUM CHLORIDE 1000 ML: 600; 310; 30; 20 INJECTION, SOLUTION INTRAVENOUS at 06:33

## 2017-11-11 ASSESSMENT — PAIN SCALES - GENERAL
PAINLEVEL_OUTOF10: 5
PAINLEVEL_OUTOF10: 0

## 2017-11-11 NOTE — OP REPORT
DATE OF SERVICE:  11/11/2017    PREOPERATIVE DIAGNOSES:  Left carpal tunnel syndrome.    POSTOPERATIVE DIAGNOSIS:  Left carpal tunnel syndrome.    PROCEDURE:  Left carpal tunnel release.    SURGEON:  Bailey Vargas MD    ASSISTANT:  None.    ANESTHESIA:  General anesthetic.    ANESTHETIST:  Rich Carmona MD    PREPARATION:  Routine.    DESCRIPTION OF PROCEDURE:  The patient was brought to the operating room and   following induction, patient was intubated and placed under general anesthetic   supine on the operating table.  All pressure points were padded.  Sequential   stockings were in place.  Left arm was prepped and draped in a sterile   fashion.  Proposed incision site was marked out and injected with lidocaine 1%   without epinephrine.  This was done after a time-out.    A linear incision was made in the palmar skin crease, carried down to the   subcutaneous tissue with a small retractor placed to maintain exposure.  We   opened the transverse ligament down to the median nerve with a clean 15-bladed   scalpel.    We then opened proximally and distally with use of a grooved director and a   15-bladed scalpel, releasing the nerve well into the arm and then well down   into the hand.  There was no compression at least for 4 cm into the arm and   clearly it was released into the hand.  No excessive bleeding was obtained.    Meticulous hemostasis was obtained.  We closed the incision with use of 4-0   Nurolon using a running unlocked stitch.  Bulky dressing was placed.  The   patient tolerated the procedure without complication.       ____________________________________     BAILEY VRAGAS MD    JKM / NTS    DD:  11/11/2017 08:38:31  DT:  11/11/2017 09:01:06    D#:  [id number]  Job#:  [id number]    cc: NEWTON NGUYEN DO

## 2017-11-11 NOTE — DISCHARGE INSTRUCTIONS
ACTIVITY: Rest and take it easy for the first 24 hours.  A responsible adult is recommended to remain with you during that time.  It is normal to feel sleepy.  We encourage you to not do anything that requires balance, judgment or coordination.    MILD FLU-LIKE SYMPTOMS ARE NORMAL. YOU MAY EXPERIENCE GENERALIZED MUSCLE ACHES, THROAT IRRITATION, HEADACHE AND/OR SOME NAUSEA.    FOR 24 HOURS DO NOT:  Drive, operate machinery or run household appliances.  Drink beer or alcoholic beverages.   Make important decisions or sign legal documents.    SPECIAL INSTRUCTIONS: Follow up for suture removal as scheduled    DIET: To avoid nausea, slowly advance diet as tolerated, avoiding spicy or greasy foods for the first day.  Add more substantial food to your diet according to your physician's instructions.  Babies can be fed formula or breast milk as soon as they are hungry.  INCREASE FLUIDS AND FIBER TO AVOID CONSTIPATION.    SURGICAL DRESSING/BATHING: Follow instructions given to you by your surgeon    FOLLOW-UP APPOINTMENT:  A follow-up appointment should be arranged with your doctor; call to schedule.    You should CALL YOUR PHYSICIAN if you develop:  Fever greater than 101 degrees F.  Pain not relieved by medication, or persistent nausea or vomiting.  Excessive bleeding (blood soaking through dressing) or unexpected drainage from the wound.  Extreme redness or swelling around the incision site, drainage of pus or foul smelling drainage.  Inability to urinate or empty your bladder within 8 hours.  Problems with breathing or chest pain.    You should call 911 if you develop problems with breathing or chest pain.  If you are unable to contact your doctor or surgical center, you should go to the nearest emergency room or urgent care center.  Physician's telephone #: Dr. Schaeffer [phone]    If any questions arise, call your doctor.  If your doctor is not available, please feel free to call the Surgical Center at  [phone].  The Center is open Monday through Friday from 7AM to 7PM.  You can also call the HEALTH HOTLINE open 24 hours/day, 7 days/week and speak to a nurse at [phone], or toll free at [phone].    A registered nurse may call you a few days after your surgery to see how you are doing after your procedure.    MEDICATIONS: Resume taking daily medication.  Take prescribed pain medication with food.  If no medication is prescribed, you may take non-aspirin pain medication if needed.  PAIN MEDICATION CAN BE VERY CONSTIPATING.  Take a stool softener or laxative such as senokot, pericolace, or milk of magnesia if needed.    Prescription given for Norco.  No pain medication given in recovery.    If your physician has prescribed pain medication that includes Acetaminophen (Tylenol), do not take additional Acetaminophen (Tylenol) while taking the prescribed medication.    Depression / Suicide Risk    As you are discharged from this [facility] facility, it is important to learn how to keep safe from harming yourself.    Recognize the warning signs:  · Abrupt changes in personality, positive or negative- including increase in energy   · Giving away possessions  · Change in eating patterns- significant weight changes-  positive or negative  · Change in sleeping patterns- unable to sleep or sleeping all the time   · Unwillingness or inability to communicate  · Depression  · Unusual sadness, discouragement and loneliness  · Talk of wanting to die  · Neglect of personal appearance   · Rebelliousness- reckless behavior  · Withdrawal from people/activities they love  · Confusion- inability to concentrate     If you or a loved one observes any of these behaviors or has concerns about self-harm, here's what you can do:  · Talk about it- your feelings and reasons for harming yourself  · Remove any means that you might use to hurt yourself (examples: pills, rope, extension cords, firearm)  · Get professional help  from the community (Mental Health, Substance Abuse, psychological counseling)  · Do not be alone:Call your Safe Contact- someone whom you trust who will be there for you.  · Call your local CRISIS HOTLINE 424-2591 or 453-722-7077  · Call your local Children's Mobile Crisis Response Team [place] (693) 232-1022 or www.[employer]  · Call the toll free National Suicide Prevention Hotlines   · National Suicide Prevention Lifeline 665-808-USQU (7035)  · National Hope Line Network 800-SUICIDE (699-8250)

## 2017-11-11 NOTE — OR SURGEON
Immediate Post OP Note    PreOp Diagnosis: left carpal tunnel syndrome    PostOp Diagnosis: same    Procedure(s):  CARPAL TUNNEL RELEASE - Wound Class: Clean    Surgeon(s):  Woody Schaeffer M.D.    Anesthesiologist/Type of Anesthesia:  Anesthesiologist: Rich Carmona M.D./General    Surgical Staff:  Circulator: Debo Joshi R.N.  Scrub Person: Derrick JACKSON Donor    Specimens:  * No specimens in log *    Estimated Blood Loss: none    Findings: tight carpal tunnel    Complications: none        11/11/2017 8:34 AM Woody Schaeffer

## 2018-01-11 DIAGNOSIS — E78.5 DYSLIPIDEMIA: ICD-10-CM

## 2018-01-11 RX ORDER — ENALAPRIL MALEATE 20 MG/1
20 TABLET ORAL 2 TIMES DAILY
Qty: 180 TAB | Refills: 3 | Status: SHIPPED | OUTPATIENT
Start: 2018-01-11 | End: 2019-02-11 | Stop reason: SDUPTHER

## 2018-01-11 RX ORDER — SIMVASTATIN 20 MG
20 TABLET ORAL EVERY EVENING
Qty: 90 TAB | Refills: 3 | Status: SHIPPED | OUTPATIENT
Start: 2018-01-11 | End: 2018-02-14

## 2018-01-15 ENCOUNTER — OFFICE VISIT (OUTPATIENT)
Dept: MEDICAL GROUP | Facility: CLINIC | Age: 63
End: 2018-01-15
Payer: COMMERCIAL

## 2018-01-15 VITALS
OXYGEN SATURATION: 97 % | RESPIRATION RATE: 16 BRPM | WEIGHT: 170 LBS | HEART RATE: 76 BPM | TEMPERATURE: 98 F | BODY MASS INDEX: 29.02 KG/M2 | HEIGHT: 64 IN | DIASTOLIC BLOOD PRESSURE: 60 MMHG | SYSTOLIC BLOOD PRESSURE: 128 MMHG

## 2018-01-15 DIAGNOSIS — J40 BRONCHITIS: ICD-10-CM

## 2018-01-15 LAB
INT CON NEG: NEGATIVE
INT CON POS: POSITIVE
S PYO AG THROAT QL: NEGATIVE

## 2018-01-15 PROCEDURE — 99212 OFFICE O/P EST SF 10 MIN: CPT | Performed by: NURSE PRACTITIONER

## 2018-01-15 PROCEDURE — 87880 STREP A ASSAY W/OPTIC: CPT | Performed by: NURSE PRACTITIONER

## 2018-01-15 RX ORDER — CODEINE PHOSPHATE AND GUAIFENESIN 10; 100 MG/5ML; MG/5ML
5-10 SOLUTION ORAL NIGHTLY PRN
Qty: 100 ML | Refills: 0 | Status: SHIPPED | OUTPATIENT
Start: 2018-01-15 | End: 2018-01-25

## 2018-01-15 ASSESSMENT — ENCOUNTER SYMPTOMS
SPUTUM PRODUCTION: 1
WHEEZING: 1
SINUS PAIN: 0
CHILLS: 0
COUGH: 1
SHORTNESS OF BREATH: 1
MYALGIAS: 1
FEVER: 1
SORE THROAT: 1

## 2018-01-15 NOTE — PROGRESS NOTES
Chief Complaint   Patient presents with   • URI     productive cough/ sore throat/ achy/ low grade fever x 4 days        HISTORY OF PRESENT ILLNESS: Patient is a 62 y.o. female established patient who presents today due to 3-4 days hx of productive coughing, low grade fever, sore throat, muscle soreness. 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 She is taking Robitussin DM for coughing with little help and her coughing is very bad at night time. She has asthma and she uses Dulera and prn albuterol. During winter time, she usually needs albuterol 1-2 times per day but in the past few days, she has been using 6-8 times per day for coughing and sob.       Patient Active Problem List    Diagnosis Date Noted   • Carpal tunnel syndrome on right 11/12/2016   • Cervicalgia 09/14/2016   • White coat hypertension 04/04/2016   • Essential hypertension 04/04/2016   • Dyslipidemia 04/04/2016       Allergies:Patient has no known allergies.    Current Outpatient Prescriptions   Medication Sig Dispense Refill   • guaifenesin-codeine (ROBITUSSIN AC) Solution oral solution Take 5-10 mL by mouth at bedtime as needed for up to 10 days. 100 mL 0   • enalapril (VASOTEC) 20 MG tablet Take 1 Tab by mouth 2 times a day. 180 Tab 3   • simvastatin (ZOCOR) 20 MG Tab Take 1 Tab by mouth every evening. 90 Tab 3   • Cyanocobalamin (VITAMIN B 12 PO) Take 1 Tab by mouth every day.     • amlodipine (NORVASC) 2.5 MG Tab Take 1 Tab by mouth every day. 90 Tab 3   • hydrochlorothiazide (HYDRODIURIL) 12.5 MG tablet Take 1 Tab by mouth every day. 90 Tab 3   • fluoxetine (PROZAC) 10 MG Cap Take 1 Cap by mouth every day. 30 Cap 3   • albuterol 108 (90 BASE) MCG/ACT Aero Soln inhalation aerosol Inhale 2 Puffs by mouth every 6 hours as needed for Shortness of Breath. 8.5 g 3   • Mometasone Furo-Formoterol Fum (DULERA) 100-5 MCG/ACT Aerosol Inhale 2 Puffs by mouth 2 times a day as needed. 1 Inhaler 3   • gabapentin  "(NEURONTIN) 300 MG Cap Take 2 Caps by mouth every 6 hours. 720 Cap 3   • ascorbic acid (ASCORBIC ACID) 500 MG Tab Take 500 mg by mouth every day.     • CRANBERRY EXTRACT PO Take  by mouth 2 Times a Day.     • hydrocodone/APAP 5/325 mg (NORCO) 5-325 Tab Take 1 Tab by mouth every four hours as needed. 25 Tab 0     No current facility-administered medications for this visit.        Social History   Substance Use Topics   • Smoking status: Former Smoker     Packs/day: 1.00     Years: 15.00     Types: Cigarettes     Quit date: 1989   • Smokeless tobacco: Never Used   • Alcohol use 0.0 oz/week      Comment: 4/week       Family Status   Relation Status   • Son Alive   • Mother    • Father    • Brother Alive   • Maternal Grandmother      Family History   Problem Relation Age of Onset   • Hypertension Son    • Hypertension Father    • Diabetes Maternal Grandmother          ROS:  Review of Systems   Constitutional: Positive for fever. Negative for chills.   HENT: Positive for sore throat. Negative for sinus pain.    Respiratory: Positive for cough, sputum production, shortness of breath and wheezing.    Musculoskeletal: Positive for myalgias.        Objective:     Blood pressure 128/60, pulse 76, temperature 36.7 °C (98 °F), resp. rate 16, height 1.626 m (5' 4\"), weight 77.1 kg (170 lb), SpO2 97 %.     Physical Exam:  Physical Exam   Constitutional: She is oriented to person, place, and time and well-developed, well-nourished, and in no distress.   HENT:   Head: Normocephalic and atraumatic.   Eyes: Conjunctivae are normal.   Neck: Neck supple. No JVD present.   Cardiovascular: Normal rate and regular rhythm.    Pulmonary/Chest: Effort normal and breath sounds normal. No respiratory distress. She has no wheezes. She has no rales.   Musculoskeletal: Normal range of motion. She exhibits no edema.   Neurological: She is alert and oriented to person, place, and time.   Skin: Skin is warm. No erythema.   Vitals " reviewed.        Assessment/Plan:  1. Bronchitis  - POCT Rapid Strep A: negative  - guaifenesin-codeine (ROBITUSSIN AC) Solution oral solution; Take 5-10 mL by mouth at bedtime as needed for up to 10 days.  Dispense: 100 mL; Refill: 0  - stay hydrated and plenty of rest. Call if worsening symptoms in three days, will consider to cover with abx. No wheezing. No steroid needed at this time. Can continue home inhaler regimen.    Differential diagnoses and indications for immediate follow-up discussed with patient.    Instructed to return to clinic or nearest emergency department for any change in condition, further concerns, or worsening of symptoms.    The patient demonstrated a good understanding and agreed with the treatment plan.

## 2018-02-14 ENCOUNTER — OFFICE VISIT (OUTPATIENT)
Dept: MEDICAL GROUP | Facility: CLINIC | Age: 63
End: 2018-02-14
Payer: COMMERCIAL

## 2018-02-14 VITALS
HEIGHT: 64 IN | OXYGEN SATURATION: 98 % | TEMPERATURE: 97.8 F | BODY MASS INDEX: 28.24 KG/M2 | SYSTOLIC BLOOD PRESSURE: 124 MMHG | DIASTOLIC BLOOD PRESSURE: 74 MMHG | RESPIRATION RATE: 14 BRPM | WEIGHT: 165.4 LBS | HEART RATE: 54 BPM

## 2018-02-14 DIAGNOSIS — Z00.00 PREVENTATIVE HEALTH CARE: ICD-10-CM

## 2018-02-14 DIAGNOSIS — Z12.11 SCREENING FOR COLON CANCER: ICD-10-CM

## 2018-02-14 DIAGNOSIS — F32.A DEPRESSION, UNSPECIFIED DEPRESSION TYPE: ICD-10-CM

## 2018-02-14 DIAGNOSIS — G47.00 INSOMNIA, UNSPECIFIED TYPE: ICD-10-CM

## 2018-02-14 DIAGNOSIS — E78.5 DYSLIPIDEMIA: ICD-10-CM

## 2018-02-14 PROCEDURE — 99396 PREV VISIT EST AGE 40-64: CPT | Performed by: INTERNAL MEDICINE

## 2018-02-14 RX ORDER — ATORVASTATIN CALCIUM 10 MG/1
10 TABLET, FILM COATED ORAL DAILY
Qty: 90 TAB | Refills: 3 | Status: SHIPPED | OUTPATIENT
Start: 2018-02-14 | End: 2019-01-30 | Stop reason: SDUPTHER

## 2018-02-14 RX ORDER — FLUOXETINE 10 MG/1
10 CAPSULE ORAL DAILY
Qty: 90 CAP | Refills: 3 | Status: SHIPPED | OUTPATIENT
Start: 2018-02-14 | End: 2019-01-30 | Stop reason: SDUPTHER

## 2018-02-14 RX ORDER — ATORVASTATIN CALCIUM 10 MG/1
10 TABLET, FILM COATED ORAL DAILY
Qty: 90 TAB | Refills: 3 | Status: SHIPPED | OUTPATIENT
Start: 2018-02-14 | End: 2018-02-14 | Stop reason: SDUPTHER

## 2018-02-14 RX ORDER — TRAZODONE HYDROCHLORIDE 50 MG/1
50 TABLET ORAL
Qty: 30 TAB | Refills: 3 | Status: SHIPPED | OUTPATIENT
Start: 2018-02-14 | End: 2018-11-09

## 2018-02-14 ASSESSMENT — PATIENT HEALTH QUESTIONNAIRE - PHQ9: CLINICAL INTERPRETATION OF PHQ2 SCORE: 0

## 2018-02-15 NOTE — PROGRESS NOTES
CC: Agatha Fritz is a 62 y.o. female is suffering from   Chief Complaint   Patient presents with   • Annual Exam         SUBJECTIVE:  1. Preventative health care  Patient's here for an annual examination, states she is doing. Well is having problems with upper respiratory tract symptoms but feels she is improving.     2. Screening for colon cancer  Patient is in need of a referral for repeat colonoscopy referral is been written    3. Dyslipidemia  Patient's need of repeat cholesterol testing orders written medication refilled, and insisted the patient changed from simvastatin to atorvastatin!    4. Insomnia, unspecified type  Patient with a history of insomnia discussed with her sleep hygiene which she practices religiously, was also discussed the use of trazodone    5. Depression, unspecified depression type  Patient does have some mild-to-moderate depression, suggested that she stay on her current dosage of Prozac we will add in trazodone        Past social, family, history:   Social History   Substance Use Topics   • Smoking status: Former Smoker     Packs/day: 1.00     Years: 15.00     Types: Cigarettes     Quit date: 1/1/1989   • Smokeless tobacco: Never Used   • Alcohol use 0.0 oz/week      Comment: 4/week         MEDICATIONS:    Current Outpatient Prescriptions:   •  Multiple Vitamins-Minerals (MULTIVITAMIN ADULT PO), Take  by mouth., Disp: , Rfl:   •  aspirin 81 MG tablet, Take 81 mg by mouth every day., Disp: , Rfl:   •  traZODone (DESYREL) 50 MG Tab, Take 1 Tab by mouth every bedtime., Disp: 30 Tab, Rfl: 3  •  NON SPECIFIED, Nocturnal desaturation study., Disp: 1 Each, Rfl: 0  •  FLUoxetine (PROZAC) 10 MG Cap, Take 1 Cap by mouth every day., Disp: 90 Cap, Rfl: 3  •  atorvastatin (LIPITOR) 10 MG Tab, Take 1 Tab by mouth every day., Disp: 90 Tab, Rfl: 3  •  enalapril (VASOTEC) 20 MG tablet, Take 1 Tab by mouth 2 times a day., Disp: 180 Tab, Rfl: 3  •  Cyanocobalamin (VITAMIN B 12 PO), Take 1  Tab by mouth every day., Disp: , Rfl:   •  amlodipine (NORVASC) 2.5 MG Tab, Take 1 Tab by mouth every day., Disp: 90 Tab, Rfl: 3  •  hydrochlorothiazide (HYDRODIURIL) 12.5 MG tablet, Take 1 Tab by mouth every day., Disp: 90 Tab, Rfl: 3  •  gabapentin (NEURONTIN) 300 MG Cap, Take 2 Caps by mouth every 6 hours., Disp: 720 Cap, Rfl: 3  •  ascorbic acid (ASCORBIC ACID) 500 MG Tab, Take 500 mg by mouth every day., Disp: , Rfl:   •  CRANBERRY EXTRACT PO, Take  by mouth 2 Times a Day., Disp: , Rfl:   •  hydrocodone/APAP 5/325 mg (NORCO) 5-325 Tab, Take 1 Tab by mouth every four hours as needed., Disp: 25 Tab, Rfl: 0  •  albuterol 108 (90 BASE) MCG/ACT Aero Soln inhalation aerosol, Inhale 2 Puffs by mouth every 6 hours as needed for Shortness of Breath., Disp: 8.5 g, Rfl: 3  •  Mometasone Furo-Formoterol Fum (DULERA) 100-5 MCG/ACT Aerosol, Inhale 2 Puffs by mouth 2 times a day as needed., Disp: 1 Inhaler, Rfl: 3    PROBLEMS:  Patient Active Problem List    Diagnosis Date Noted   • Carpal tunnel syndrome on right 11/12/2016   • Cervicalgia 09/14/2016   • White coat hypertension 04/04/2016   • Essential hypertension 04/04/2016   • Dyslipidemia 04/04/2016       REVIEW OF SYSTEMS:  Gen.:  No Nausea, Vomiting, fever, Chills.  Heart: No chest pain.  Lungs:  No shortness of Breath.  Psychological: Cecilio unusual Anxiety depression     PHYSICAL EXAM   Constitutional: Alert, cooperative, not in acute distress.  Cardiovascular:  Rate Rhythm is regular without murmurs rubs clicks.     Thorax & Lungs: Clear to auscultation, no wheezing, rhonchi, or rales  HENT: Normocephalic, Atraumatic.  Eyes: PERRLA, EOMI, Conjunctiva normal.   Neck: Trachia is midline no swelling of the thyroid.   Lymphatic: No lymphadenopathy noted.   Abdomin: Soft non-tender, no rebound, no guarding.   Neurologic: Alert & oriented x 3, cranial nerves II through XII are intact, Normal motor function, Normal sensory function, No focal deficits noted.   Psychiatric:  "Affect normal, Judgment normal, Mood normal.     VITAL SIGNS:/74   Pulse (!) 54   Temp 36.6 °C (97.8 °F)   Resp 14   Ht 1.626 m (5' 4\")   Wt 75 kg (165 lb 6.4 oz)   LMP  (LMP Unknown)   SpO2 98%   Breastfeeding? No   BMI 28.39 kg/m²     Labs: Reviewed    Assessment:                                                     Plan:    1. Preventative health care  Preventative healthcare patient's in very good physical condition    2. Screening for colon cancer  Referral written to gastroenterology  - REFERRAL TO GASTROENTEROLOGY  - REFERRAL TO DERMATOLOGY    3. Dyslipidemia  I'll have the patient stop simvastatin and changed to atorvastatin  - LIPID PROFILE; Future  - atorvastatin (LIPITOR) 10 MG Tab; Take 1 Tab by mouth every day.  Dispense: 90 Tab; Refill: 3    4. Insomnia, unspecified type  Start trazodone  - traZODone (DESYREL) 50 MG Tab; Take 1 Tab by mouth every bedtime.  Dispense: 30 Tab; Refill: 3  - NON SPECIFIED; Nocturnal desaturation study.  Dispense: 1 Each; Refill: 0    5. Depression, unspecified depression type  Continue Prozac  - FLUoxetine (PROZAC) 10 MG Cap; Take 1 Cap by mouth every day.  Dispense: 90 Cap; Refill: 3        "

## 2018-04-16 ENCOUNTER — PATIENT MESSAGE (OUTPATIENT)
Dept: MEDICAL GROUP | Facility: CLINIC | Age: 63
End: 2018-04-16

## 2018-04-16 DIAGNOSIS — M79.671 PAIN IN BOTH FEET: ICD-10-CM

## 2018-04-16 DIAGNOSIS — M79.672 PAIN IN BOTH FEET: ICD-10-CM

## 2018-04-25 ENCOUNTER — HOSPITAL ENCOUNTER (OUTPATIENT)
Dept: RADIOLOGY | Facility: MEDICAL CENTER | Age: 63
End: 2018-04-25
Attending: INTERNAL MEDICINE
Payer: COMMERCIAL

## 2018-04-25 DIAGNOSIS — Z12.39 ENCOUNTER FOR SCREENING FOR MALIGNANT NEOPLASM OF BREAST: ICD-10-CM

## 2018-04-25 PROCEDURE — 77063 BREAST TOMOSYNTHESIS BI: CPT

## 2018-05-07 ENCOUNTER — HOSPITAL ENCOUNTER (OUTPATIENT)
Dept: LAB | Facility: MEDICAL CENTER | Age: 63
End: 2018-05-07
Attending: INTERNAL MEDICINE
Payer: COMMERCIAL

## 2018-05-07 DIAGNOSIS — E78.5 DYSLIPIDEMIA: ICD-10-CM

## 2018-05-07 LAB
CHOLEST SERPL-MCNC: 149 MG/DL (ref 100–199)
HDLC SERPL-MCNC: 66 MG/DL
LDLC SERPL CALC-MCNC: 65 MG/DL
TRIGL SERPL-MCNC: 88 MG/DL (ref 0–149)

## 2018-05-07 PROCEDURE — 80061 LIPID PANEL: CPT

## 2018-05-07 PROCEDURE — 36415 COLL VENOUS BLD VENIPUNCTURE: CPT

## 2018-06-26 ENCOUNTER — OFFICE VISIT (OUTPATIENT)
Dept: DERMATOLOGY | Facility: IMAGING CENTER | Age: 63
End: 2018-06-26
Payer: COMMERCIAL

## 2018-06-26 ENCOUNTER — PATIENT MESSAGE (OUTPATIENT)
Dept: MEDICAL GROUP | Facility: CLINIC | Age: 63
End: 2018-06-26

## 2018-06-26 ENCOUNTER — APPOINTMENT (OUTPATIENT)
Dept: DERMATOLOGY | Facility: IMAGING CENTER | Age: 63
End: 2018-06-26

## 2018-06-26 VITALS — TEMPERATURE: 98.1 F | BODY MASS INDEX: 26.46 KG/M2 | WEIGHT: 155 LBS | HEIGHT: 64 IN

## 2018-06-26 DIAGNOSIS — J34.89 NASAL OBSTRUCTION: ICD-10-CM

## 2018-06-26 DIAGNOSIS — D22.9 MULTIPLE NEVI: ICD-10-CM

## 2018-06-26 DIAGNOSIS — D18.01 CHERRY ANGIOMA: ICD-10-CM

## 2018-06-26 DIAGNOSIS — I78.1 TELANGIECTASIA: ICD-10-CM

## 2018-06-26 DIAGNOSIS — L82.1 SEBORRHEIC KERATOSES: ICD-10-CM

## 2018-06-26 DIAGNOSIS — L57.0 ACTINIC KERATOSES: ICD-10-CM

## 2018-06-26 DIAGNOSIS — R58 ECCHYMOSIS: ICD-10-CM

## 2018-06-26 PROCEDURE — 99203 OFFICE O/P NEW LOW 30 MIN: CPT | Mod: 25 | Performed by: DERMATOLOGY

## 2018-06-26 PROCEDURE — 17000 DESTRUCT PREMALG LESION: CPT | Performed by: DERMATOLOGY

## 2018-06-26 ASSESSMENT — ENCOUNTER SYMPTOMS
CHILLS: 0
FEVER: 0

## 2018-06-26 NOTE — PROGRESS NOTES
Dermatology New Patient Visit    Chief Complaint   Patient presents with   • Establish Care       Subjective:     HPI:   Agatha Fritz is a 63 y.o. female presenting for    Patient has never had a full skin check and has ++significant history of sun exposure  HPI/location: Spot on the right arm  Time present: 3 days  Painful lesion: No  Itching lesion: No  Enlarging lesion: No  Anything make it better or worse? Tried OTC med for bruising.    Spot on the left cheek  Noted approx 9 months ago  No recent change in size  No itching/bleeding/pain  No treatments    Brown spots on the legs, one elevated on the left inner knee  Several over the years  No recent change in size  No itching/bleeding/pain  No treatments    History of skin cancer: No  History of precancers/actinic keratoses: No  History of biopsies:No  History of blistering/severe sunburns:Yes, Details: as a child  Family history of skin cancer:No  Family history of atypical moles:No        Past Medical History:   Diagnosis Date   • Anesthesia     nitrous oxide in dental work causes panic attacks   • Arthritis     osteo, left knee   • Asthma     seasonal-uses inhalers in the winter   • Bronchitis 2014   • Dyslipidemia 4/4/2016   • High cholesterol    • Hypertension    • Psychiatric problem 9/13/2016    depression       Current Outpatient Prescriptions on File Prior to Visit   Medication Sig Dispense Refill   • traZODone (DESYREL) 50 MG Tab Take 1 Tab by mouth every bedtime. 30 Tab 3   • atorvastatin (LIPITOR) 10 MG Tab Take 1 Tab by mouth every day. 90 Tab 3   • enalapril (VASOTEC) 20 MG tablet Take 1 Tab by mouth 2 times a day. 180 Tab 3   • amlodipine (NORVASC) 2.5 MG Tab Take 1 Tab by mouth every day. 90 Tab 3   • hydrochlorothiazide (HYDRODIURIL) 12.5 MG tablet Take 1 Tab by mouth every day. 90 Tab 3   • albuterol 108 (90 BASE) MCG/ACT Aero Soln inhalation aerosol Inhale 2 Puffs by mouth every 6 hours as needed for Shortness of Breath. 8.5 g 3   •  Mometasone Furo-Formoterol Fum (DULERA) 100-5 MCG/ACT Aerosol Inhale 2 Puffs by mouth 2 times a day as needed. 1 Inhaler 3   • Multiple Vitamins-Minerals (MULTIVITAMIN ADULT PO) Take  by mouth.     • aspirin 81 MG tablet Take 81 mg by mouth every day.     • NON SPECIFIED Nocturnal desaturation study. 1 Each 0   • FLUoxetine (PROZAC) 10 MG Cap Take 1 Cap by mouth every day. 90 Cap 3   • hydrocodone/APAP 5/325 mg (NORCO) 5-325 Tab Take 1 Tab by mouth every four hours as needed. 25 Tab 0   • Cyanocobalamin (VITAMIN B 12 PO) Take 1 Tab by mouth every day.     • gabapentin (NEURONTIN) 300 MG Cap Take 2 Caps by mouth every 6 hours. 720 Cap 3   • ascorbic acid (ASCORBIC ACID) 500 MG Tab Take 500 mg by mouth every day.     • CRANBERRY EXTRACT PO Take  by mouth 2 Times a Day.       No current facility-administered medications on file prior to visit.        No Known Allergies    Family History   Problem Relation Age of Onset   • Hypertension Son    • Hypertension Father    • Diabetes Maternal Grandmother        Social History     Social History   • Marital status:      Spouse name: N/A   • Number of children: N/A   • Years of education: N/A     Occupational History   • registered nurse      [employer]     Social History Main Topics   • Smoking status: Former Smoker     Packs/day: 1.00     Years: 15.00     Types: Cigarettes     Quit date: 1/1/1989   • Smokeless tobacco: Never Used   • Alcohol use 0.0 oz/week      Comment: 4/week   • Drug use: No   • Sexual activity: Not on file     Other Topics Concern   • Not on file     Social History Narrative   • No narrative on file       Review of Systems   Constitutional: Negative for chills and fever.   Skin: Negative for itching and rash.   All other systems reviewed and are negative.       Objective:     A full mucocutaneous exam was completed including: scalp, hair, ears, face, eyelids, conjunctiva, lips, gums/tongue/oropharynx, neck, chest breasts, abdomen, back, left and right  "upper extremities (including hands/digits and fingernails), left and right lower extremities (including feet/toes, toenails), buttocks, excluding external genitalia (patient refusal) with the following pertinent findings listed below. Remaining above-listed examined areas within normal limits / negative for rashes or lesions.    Temperature 36.7 °C (98.1 °F), height 1.626 m (5' 4\"), weight 70.3 kg (155 lb).    Physical Exam   Constitutional: She is oriented to person, place, and time and well-developed, well-nourished, and in no distress.   HENT:   Head: Normocephalic and atraumatic.       Right Ear: External ear normal.   Left Ear: External ear normal.   Nose: Nose normal.   Mouth/Throat: Oropharynx is clear and moist.   Eyes: Conjunctivae and lids are normal.   Neck: Normal range of motion. Neck supple.   Cardiovascular: Intact distal pulses.    Pulmonary/Chest: Effort normal.   Neurological: She is alert and oriented to person, place, and time.   Skin: Skin is warm and dry.        Psychiatric: Mood and affect normal.   Vitals reviewed.      DATA: none applicable to review    Assessment and Plan:     1. Actinic keratosis  CRYOTHERAPY:  Risks (including, but not limited to: hypo or hyperpigmentation, redness, blister, blood blister, recurrence, need for further treatment, infection, scar) and benefits of cryotherapy discussed. Patient verbally agreed to proceed with treatment. 2 cryotherapy freeze thaw cycles of 10 seconds were applied to 1 lesion on the cheek with cryac. Patient tolerated procedure well. Aftercare instructions given.    2. Telangiectasias  - Benign-appearing nature of lesions discussed. Advised to return to clinic for any new or concerning changes.  - discussed cosmetic treatment options    3. Multiple nevi  - Benign-appearing nature of lesions discussed. Advised to return to clinic for any new or concerning changes.  - ABCDE's of melanoma discussed    4. Seborrheic keratoses  - Benign-appearing " nature of lesions discussed. Advised to return to clinic for any new or concerning changes.    5. Cherry angiomas  - Benign-appearing nature of lesions discussed. Advised to return to clinic for any new or concerning changes.    6. Actinic purpura  - educated patient about diagnosis, management options,   - discussed importance of regular sun protection/sunscreen use, SPF 30 or greater with broad spectrum coverage, need for reapplication every  minutes      Followup: Return in about 1 year (around 6/26/2019) for zeke.    Rosina Katz M.D.

## 2018-06-27 ENCOUNTER — PATIENT MESSAGE (OUTPATIENT)
Dept: MEDICAL GROUP | Facility: CLINIC | Age: 63
End: 2018-06-27

## 2018-06-27 DIAGNOSIS — G47.34 NOCTURNAL OXYGEN DESATURATION: ICD-10-CM

## 2018-06-29 NOTE — TELEPHONE ENCOUNTER
From: Agatha Fritz  To: Tomás Salazar D.O.  Sent: 6/27/2018 9:00 AM PDT  Subject: Non-Urgent Medical Question    Thanks for doing the referral. My mistake-not an overnight Holter. It was an overnight pulse ox study. I don't see results of that study in [facility]. Did you ever receive results?  Agatha FINK RN  ----- Message -----  From: Tomás Salazar D.O.  Sent: 6/26/2018 5:47 PM PDT  To: Agatha Fritz  Subject: RE: Non-Urgent Medical Question  Agatha:  Your referral to Dr. Briggs was completed!  Regards, Tomás Salazar,       ----- Message -----   From: Agatha Fritz   Sent: 6/26/2018 9:42 AM PDT   To: Tomás Salazar D.O.  Subject: Non-Urgent Medical Question    I would like a referral to Dr. Ariella Briggs, ENT. 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 Snoring and sleep disturbances are increasing (I never have seen the results of the Overnight Holter Monitor of March 26, 18). I am noticing intermittent, symptomless bradycardia and feel it might be related to some apnea. I am now interested in the septoplasty. I have tried Flonase. Trazodone works well for sleep but is used infrequently. Please contact Dr. Briggs's office for scheduling. Thank you.  Agahta FINK RN

## 2018-07-19 ENCOUNTER — APPOINTMENT (OUTPATIENT)
Dept: DERMATOLOGY | Facility: IMAGING CENTER | Age: 63
End: 2018-07-19

## 2018-07-26 ENCOUNTER — OFFICE VISIT (OUTPATIENT)
Dept: MEDICAL GROUP | Facility: MEDICAL CENTER | Age: 63
End: 2018-07-26
Payer: COMMERCIAL

## 2018-07-26 VITALS
OXYGEN SATURATION: 98 % | BODY MASS INDEX: 25.89 KG/M2 | TEMPERATURE: 98.2 F | RESPIRATION RATE: 16 BRPM | HEART RATE: 60 BPM | DIASTOLIC BLOOD PRESSURE: 80 MMHG | HEIGHT: 64 IN | SYSTOLIC BLOOD PRESSURE: 126 MMHG | WEIGHT: 151.68 LBS

## 2018-07-26 DIAGNOSIS — I10 ESSENTIAL HYPERTENSION: ICD-10-CM

## 2018-07-26 DIAGNOSIS — Z00.00 HEALTH MAINTENANCE EXAMINATION: ICD-10-CM

## 2018-07-26 DIAGNOSIS — F32.5 MAJOR DEPRESSIVE DISORDER WITH SINGLE EPISODE, IN FULL REMISSION (HCC): ICD-10-CM

## 2018-07-26 DIAGNOSIS — J34.2 NASAL SEPTAL DEVIATION: ICD-10-CM

## 2018-07-26 DIAGNOSIS — J45.20 MILD INTERMITTENT ASTHMA WITHOUT COMPLICATION: ICD-10-CM

## 2018-07-26 DIAGNOSIS — M15.9 PRIMARY OSTEOARTHRITIS INVOLVING MULTIPLE JOINTS: ICD-10-CM

## 2018-07-26 DIAGNOSIS — E78.5 DYSLIPIDEMIA: ICD-10-CM

## 2018-07-26 PROCEDURE — 99214 OFFICE O/P EST MOD 30 MIN: CPT | Performed by: FAMILY MEDICINE

## 2018-07-26 RX ORDER — HYDROCHLOROTHIAZIDE 12.5 MG/1
12.5 TABLET ORAL DAILY
Qty: 90 TAB | Refills: 3 | Status: SHIPPED | OUTPATIENT
Start: 2018-07-26 | End: 2018-08-20 | Stop reason: SDUPTHER

## 2018-07-26 RX ORDER — AMLODIPINE BESYLATE 2.5 MG/1
2.5 TABLET ORAL DAILY
Qty: 90 TAB | Refills: 3 | Status: SHIPPED | OUTPATIENT
Start: 2018-07-26 | End: 2018-08-20 | Stop reason: SDUPTHER

## 2018-07-26 NOTE — PROGRESS NOTES
CC: New patient ( HTN, HLD, asthma, OA)    HPI:  Agatha presents today to establish a new PCP.    Patient has been active, and independent with all ADLs. Still works as RN. Has the following medical issues:    Essential hypertension  Has been adequately controlled on current medication. Denies headache, chest pain, and SOB.has been on Enalapril 20 mg, and Amlodipine 2.5 mg daily, and HCTZ 12.5 mg. No side effects.    Dyslipidemia  She has been tolerating the statin. Denies muscle pain LFTs has been normal, has been on Lipitor 10 mg daily.No h/o DM, Stroke, or CAD.    Mild persistent asthma without complication  She is currently asymptomatic.Has been getting seasonal episodes in the winter time, and sometimes gets exertional ( exercise) episodes. She is currently on Dulera bid, and Albuterol as needed.    Major depressive disorder with single episode, in full remission ([facility])  Has been having normal mood, no suicidal ideation.She has been doing fine on the Prozac 10 mg daily.    Nasal septal deviation  It has been a chronic issue since birth. Has been snoring, last nocturnal pulse oxymetry showed some desaturation, was recommend to do the full sleep studies, however she wants to see her ENT before that.Needs a referral.    Primary osteoarthritis involving multiple joints  Affecting left knee, and cervical spine. Has been doing fine on pain medication ( she follows up with pain management)    Colonoscopy, and pneumonia vaccines are UTD.She will think about the shingles vaccine.    Patient Active Problem List    Diagnosis Date Noted   • Carpal tunnel syndrome on right 11/12/2016   • Cervicalgia 09/14/2016   • White coat hypertension 04/04/2016   • Essential hypertension 04/04/2016   • Dyslipidemia 04/04/2016       Current Outpatient Prescriptions   Medication Sig Dispense Refill   • Calcium Carb-Cholecalciferol (CALCIUM 500 + D3 PO) Take  by mouth.     • Multiple Vitamins-Minerals (MULTIVITAMIN ADULT PO) Take  by mouth.      • traZODone (DESYREL) 50 MG Tab Take 1 Tab by mouth every bedtime. 30 Tab 3   • FLUoxetine (PROZAC) 10 MG Cap Take 1 Cap by mouth every day. 90 Cap 3   • atorvastatin (LIPITOR) 10 MG Tab Take 1 Tab by mouth every day. 90 Tab 3   • enalapril (VASOTEC) 20 MG tablet Take 1 Tab by mouth 2 times a day. 180 Tab 3   • albuterol 108 (90 BASE) MCG/ACT Aero Soln inhalation aerosol Inhale 2 Puffs by mouth every 6 hours as needed for Shortness of Breath. 8.5 g 3   • Mometasone Furo-Formoterol Fum (DULERA) 100-5 MCG/ACT Aerosol Inhale 2 Puffs by mouth 2 times a day as needed. 1 Inhaler 3   • gabapentin (NEURONTIN) 300 MG Cap Take 2 Caps by mouth every 6 hours. 720 Cap 3   • ascorbic acid (ASCORBIC ACID) 500 MG Tab Take 500 mg by mouth every day.     • CRANBERRY EXTRACT PO Take  by mouth 2 Times a Day.     • amLODIPine (NORVASC) 2.5 MG Tab Take 1 Tab by mouth every day. 90 Tab 3   • hydroCHLOROthiazide (HYDRODIURIL) 12.5 MG tablet Take 1 Tab by mouth every day. 90 Tab 3   • aspirin 81 MG tablet Take 81 mg by mouth every day.     • NON SPECIFIED Nocturnal desaturation study. 1 Each 0   • hydrocodone/APAP 5/325 mg (NORCO) 5-325 Tab Take 1 Tab by mouth every four hours as needed. (Patient not taking: Reported on 7/26/2018) 25 Tab 0   • Cyanocobalamin (VITAMIN B 12 PO) Take 1 Tab by mouth every day.       No current facility-administered medications for this visit.          Allergies as of 07/26/2018   • (No Known Allergies)        Social History     Social History   • Marital status:      Spouse name: N/A   • Number of children: N/A   • Years of education: N/A     Occupational History   • registered nurse      [employer]     Social History Main Topics   • Smoking status: Former Smoker     Packs/day: 1.00     Years: 15.00     Types: Cigarettes     Quit date: 1/1/1989   • Smokeless tobacco: Never Used   • Alcohol use 0.0 oz/week      Comment: 1-2 daily anything    • Drug use: No   • Sexual activity: Not on file     Other Topics  "Concern   • Not on file     Social History Narrative   • No narrative on file       Family History   Problem Relation Age of Onset   • Hypertension Son    • Hypertension Father    • Diabetes Maternal Grandmother        Past Surgical History:   Procedure Laterality Date   • CARPAL TUNNEL RELEASE Left 11/11/2017    Procedure: CARPAL TUNNEL RELEASE;  Surgeon: Woody Schaeffer M.D.;  Location: SURGERY [facility];  Service: Neurosurgery   • CARPAL TUNNEL RELEASE Right 11/12/2016    Procedure: CARPAL TUNNEL RELEASE;  Surgeon: Woody Schaeffer M.D.;  Location: SURGERY [facility];  Service:    • PB INJ CERV/THORAC,W/WO CNTRST  9/14/2016    Procedure: INJ EPI NON NEUROLYTIC C/T - C6-7;  Surgeon: Don Padron M.D.;  Location: SURGERY [facility];  Service: Pain Management   • PB FLUORSCOPIC GUIDANCE SPINAL INJECTION  9/14/2016    Procedure: FLUOROGUIDE FOR SPINAL INJ;  Surgeon: Don Padron M.D.;  Location: SURGERY [facility];  Service: Pain Management   • BREAST BIOPSY  2002    R breast, needle biopsy   • LYMPH NODE EXCISION Left 2002    Left groin   • ACL RECONSTRUCTION W/ ALLOGRAFT Left 1995    MD Keshav Steven.    • TONSILLECTOMY         ROS:  Denies any Headache, Blurred Vision, Confusion Chest pain,  Shortness of breath,  Abdominal pain, Changes of bowel or bladder, Lower ext edema, Fevers, Nights sweats, Weight Changes, Focal weakness or numbness.  All other systems are negative.    /80   Pulse 60   Temp 36.8 °C (98.2 °F)   Resp 16   Ht 1.626 m (5' 4\")   Wt 68.8 kg (151 lb 10.8 oz)   LMP  (LMP Unknown)   SpO2 98%   BMI 26.04 kg/m²     Physical Exam:  Gen:         Alert and oriented, No apparent distress.  HEENT:   Perrla, TM clear,  Oralpharynx no erythema or exudates.  Neck:       No Jugular venous distension, Lymphadenopathy, Thyromegaly, Bruits.  Lungs:     Clear to auscultation bilaterally  CV:          Regular rate and rhythm. No murmurs, rubs or gallops.  Abd:         " Soft non tender, non distended. Normal active bowel sounds. No                                        Hepatosplenomegaly, No pulsatile masses.  Ext:          No clubbing, cyanosis, edema.      Assessment and Plan.   63 y.o. female     1. Essential hypertension  Has been adequately controlled on current medication. Denies headache, chest pain, and SOB.  Continue on Enalapril 20 mg, and Amlodipine 2.5 mg daily, and HCTZ 12.5 mg daily.    - TSH; Future    2. Dyslipidemia  He has been tolerating the statin. Denies muscle pain LFTs has been normal  Continue on Lipitor 10 mg daily.    3. Mild intermittent asthma without complication  Stable, asymptomatic.  Continue on Dulera bid, and Albuterol as needed.    4. Major depressive disorder with single episode, in full remission ([facility])  Stable. No suicidal ideation.  Continue on the Prozac 10 mg daily.    5. Nasal septal deviation  Chronic, probably congenital.  Wants a referral to ENT.    6. Primary osteoarthritis involving multiple joints  Affecting left knee, and cervical spine. Has been doing fine on pain medication ( she follows up with pain management)    7. Health maintenance examination  Colonoscopy, and pneumonia vaccines are UTD.  Will think about the shingles vaccine.

## 2018-07-26 NOTE — TELEPHONE ENCOUNTER
Was the patient seen in the last year in this department? Yes    Does patient have an active prescription for medications requested? Yes    Received Request Via: Patient

## 2018-08-20 ENCOUNTER — HOSPITAL ENCOUNTER (OUTPATIENT)
Dept: LAB | Facility: MEDICAL CENTER | Age: 63
End: 2018-08-20
Attending: FAMILY MEDICINE
Payer: COMMERCIAL

## 2018-08-20 DIAGNOSIS — I10 ESSENTIAL HYPERTENSION: ICD-10-CM

## 2018-08-20 LAB — TSH SERPL DL<=0.005 MIU/L-ACNC: 0.77 UIU/ML (ref 0.38–5.33)

## 2018-08-20 PROCEDURE — 36415 COLL VENOUS BLD VENIPUNCTURE: CPT

## 2018-08-20 PROCEDURE — 84443 ASSAY THYROID STIM HORMONE: CPT

## 2018-08-20 RX ORDER — AMLODIPINE BESYLATE 2.5 MG/1
2.5 TABLET ORAL DAILY
Qty: 90 TAB | Refills: 3 | Status: SHIPPED | OUTPATIENT
Start: 2018-08-20 | End: 2019-01-30

## 2018-08-20 RX ORDER — HYDROCHLOROTHIAZIDE 12.5 MG/1
12.5 TABLET ORAL DAILY
Qty: 90 TAB | Refills: 3 | Status: SHIPPED | OUTPATIENT
Start: 2018-08-20 | End: 2020-02-19 | Stop reason: SDUPTHER

## 2018-09-05 ENCOUNTER — HOSPITAL ENCOUNTER (OUTPATIENT)
Facility: MEDICAL CENTER | Age: 63
End: 2018-09-05
Attending: OTOLARYNGOLOGY | Admitting: OTOLARYNGOLOGY
Payer: COMMERCIAL

## 2018-09-08 ENCOUNTER — HOSPITAL ENCOUNTER (OUTPATIENT)
Facility: MEDICAL CENTER | Age: 63
End: 2018-09-08
Payer: COMMERCIAL

## 2018-09-08 LAB
BDY FAT % MEASURED: 36.6 %
BP DIAS: 78 MMHG
BP SYS: 118 MMHG
DIABETES HTDIA: NO
EVENT NAME HTEVT: NORMAL
HYPERTENSION HTHYP: YES
SCREENING LOC CITY HTCIT: NORMAL
SCREENING LOC STATE HTSTA: NORMAL
SCREENING LOCATION HTLOC: NORMAL
SUBSCRIBER ID HTSID: NORMAL

## 2018-09-09 LAB
CHOLEST SERPL-MCNC: 205 MG/DL (ref 100–199)
FASTING STATUS PATIENT QL REPORTED: NORMAL
GLUCOSE SERPL-MCNC: 93 MG/DL (ref 65–99)
HDLC SERPL-MCNC: 77 MG/DL
LDLC SERPL CALC-MCNC: 90 MG/DL
TRIGL SERPL-MCNC: 190 MG/DL (ref 0–149)

## 2018-11-09 VITALS — BODY MASS INDEX: 27.21 KG/M2 | HEIGHT: 64 IN | WEIGHT: 159.39 LBS

## 2018-11-09 DIAGNOSIS — Z01.812 PRE-OPERATIVE LABORATORY EXAMINATION: ICD-10-CM

## 2018-11-09 LAB
ANION GAP SERPL CALC-SCNC: 9 MMOL/L (ref 0–11.9)
BUN SERPL-MCNC: 18 MG/DL (ref 8–22)
CALCIUM SERPL-MCNC: 10.3 MG/DL (ref 8.5–10.5)
CHLORIDE SERPL-SCNC: 104 MMOL/L (ref 96–112)
CO2 SERPL-SCNC: 26 MMOL/L (ref 20–33)
CREAT SERPL-MCNC: 0.75 MG/DL (ref 0.5–1.4)
EKG IMPRESSION: NORMAL
GLUCOSE SERPL-MCNC: 92 MG/DL (ref 65–99)
POTASSIUM SERPL-SCNC: 3.7 MMOL/L (ref 3.6–5.5)
SODIUM SERPL-SCNC: 139 MMOL/L (ref 135–145)

## 2018-11-09 PROCEDURE — 36415 COLL VENOUS BLD VENIPUNCTURE: CPT

## 2018-11-09 PROCEDURE — 93010 ELECTROCARDIOGRAM REPORT: CPT | Performed by: INTERNAL MEDICINE

## 2018-11-09 PROCEDURE — 80048 BASIC METABOLIC PNL TOTAL CA: CPT

## 2018-11-09 PROCEDURE — 93005 ELECTROCARDIOGRAM TRACING: CPT

## 2018-11-09 RX ORDER — BIOTIN 10 MG
2 TABLET ORAL DAILY
COMMUNITY
End: 2019-08-14

## 2018-11-09 RX ORDER — GUAIFENESIN 400 MG/1
1 TABLET ORAL 2 TIMES DAILY PRN
COMMUNITY
End: 2019-08-14

## 2018-11-10 NOTE — OR NURSING
"Pre-admit appointment completed. \"Preparing for your procedure\" sheet given to pt along with verbal and written instructions. Pt instructed to continue regularly prescribed medications through the day before surgery. Pt instructed to take the following medications the day of surgery with a sip of water, per anesthesia protocol; dulera and if needed-albuterol MDI.    Pt to bring MDI DOS.   "

## 2019-01-11 ENCOUNTER — OFFICE VISIT (OUTPATIENT)
Dept: CARDIOLOGY | Facility: MEDICAL CENTER | Age: 64
End: 2019-01-11
Payer: COMMERCIAL

## 2019-01-11 VITALS
HEART RATE: 64 BPM | OXYGEN SATURATION: 99 % | HEIGHT: 64 IN | BODY MASS INDEX: 26.63 KG/M2 | SYSTOLIC BLOOD PRESSURE: 147 MMHG | WEIGHT: 156 LBS | DIASTOLIC BLOOD PRESSURE: 76 MMHG

## 2019-01-11 DIAGNOSIS — E78.5 DYSLIPIDEMIA: ICD-10-CM

## 2019-01-11 DIAGNOSIS — I10 ESSENTIAL HYPERTENSION: ICD-10-CM

## 2019-01-11 DIAGNOSIS — I45.10 RBBB: ICD-10-CM

## 2019-01-11 PROCEDURE — 99203 OFFICE O/P NEW LOW 30 MIN: CPT | Performed by: INTERNAL MEDICINE

## 2019-01-11 NOTE — PROGRESS NOTES
Chief Complaint   Patient presents with   • Abnormal EKG     new patient       Subjective:   Agatha Fritz is a 63 y.o. female who presents today for preop evaluation before septoplasty to be performed by Dr. Briggs.  Patient was noted to have a right bundle branch block on her EKG and referred here for evaluation.  She is very active enjoying snowshoeing, backpacking, spin classes and some resistive training none of which provokes any unusual symptoms.  She has no chest pain, pressure, tightness, or anginal symptoms.  There are no symptoms of congestive heart failure such as orthopnea, PND, exertional dyspnea, pedal edema.  She has a history of mild hyperlipidemia for which she takes Lipitor 10 mg a day.  She also has somewhat of a labile hypertensive situation.  Usually her blood pressure runs in the 120-130 range but occasionally can go up to 160 or so if she is stressed.  She takes enalapril 20 mg a day and amlodipine 2.5 mg at night.  She also uses hydrochlorothiazide 12.5 mg a day.  She checks her blood pressure irregularly.  The patient also had a coronary calcium score which was 24.4 (not too high but also not 0) the right bundle branch block is a chronic EKG finding having been noted possibly as long is 9 or 10 years ago.  It has been documented in the [facility] since 2016 and is unchanged.  She has had 2 surgeries with known right bundle branch block in the background and has had no problem.    Past Medical History:   Diagnosis Date   • Anesthesia     nitrous oxide in dental work causes panic attacks   • Arthritis     osteo, right toe and knees.   • Asthma     seasonal-uses inhalers in the winter   • Bronchitis 2014   • Dyslipidemia 4/4/2016   • High cholesterol    • Hypertension    • Psychiatric problem 9/13/2016    depression     Past Surgical History:   Procedure Laterality Date   • COLONOSCOPY  09/2018   • CARPAL TUNNEL RELEASE Left 11/11/2017    Procedure: CARPAL TUNNEL RELEASE;  Surgeon:  Woody Schaeffer M.D.;  Location: SURGERY [facility];  Service: Neurosurgery   • CARPAL TUNNEL RELEASE Right 11/12/2016    Procedure: CARPAL TUNNEL RELEASE;  Surgeon: Woody Schaeffer M.D.;  Location: SURGERY [facility];  Service:    • PB INJ CERV/THORAC,W/WO CNTRST  9/14/2016    Procedure: INJ EPI NON NEUROLYTIC C/T - C6-7;  Surgeon: Don Padron M.D.;  Location: SURGERY [facility];  Service: Pain Management   • PB FLUORSCOPIC GUIDANCE SPINAL INJECTION  9/14/2016    Procedure: FLUOROGUIDE FOR SPINAL INJ;  Surgeon: Don Padron M.D.;  Location: SURGERY [facility];  Service: Pain Management   • BREAST BIOPSY  2002    R breast, needle biopsy-Negative   • LYMPH NODE EXCISION Left 2002    Left groin-negative   • ACL RECONSTRUCTION W/ ALLOGRAFT Left 1995    MD Keshav Steven.    • TONSILLECTOMY  as child     Family History   Problem Relation Age of Onset   • Hypertension Son    • Hypertension Father    • Diabetes Maternal Grandmother      Social History     Social History   • Marital status:      Spouse name: N/A   • Number of children: N/A   • Years of education: N/A     Occupational History   • registered nurse      [employer]     Social History Main Topics   • Smoking status: Former Smoker     Packs/day: 1.00     Years: 15.00     Types: Cigarettes     Quit date: 1/1/1989   • Smokeless tobacco: Never Used   • Alcohol use 0.0 oz/week      Comment: 6 per week   • Drug use: No   • Sexual activity: Not on file     Other Topics Concern   • Not on file     Social History Narrative   • No narrative on file     No Known Allergies  Outpatient Encounter Prescriptions as of 1/11/2019   Medication Sig Dispense Refill   • TRAZODONE HCL PO Take  by mouth.     • Cranberry 300 MG Tab Take 1 Tab by mouth 2 Times a Day.     • Multiple Vitamins-Minerals (MULTIVITAMIN ADULT) Chew Tab Take 2 Tabs by mouth every day.     • Diclofenac Sodium (PENNSAID TD) Apply 2 Pump to skin as directed 2 Times a Day.     •  "hydroCHLOROthiazide (HYDRODIURIL) 12.5 MG tablet Take 1 Tab by mouth every day. 90 Tab 3   • amLODIPine (NORVASC) 2.5 MG Tab Take 1 Tab by mouth every day. 90 Tab 3   • Calcium Carb-Cholecalciferol (CALCIUM 500 + D3 PO) Take 1 Tab by mouth 2 Times a Day.     • FLUoxetine (PROZAC) 10 MG Cap Take 1 Cap by mouth every day. 90 Cap 3   • atorvastatin (LIPITOR) 10 MG Tab Take 1 Tab by mouth every day. 90 Tab 3   • enalapril (VASOTEC) 20 MG tablet Take 1 Tab by mouth 2 times a day. 180 Tab 3   • albuterol 108 (90 BASE) MCG/ACT Aero Soln inhalation aerosol Inhale 2 Puffs by mouth every 6 hours as needed for Shortness of Breath. 8.5 g 3   • Mometasone Furo-Formoterol Fum (DULERA) 100-5 MCG/ACT Aerosol Inhale 2 Puffs by mouth 2 times a day as needed. 1 Inhaler 3   • ascorbic acid (ASCORBIC ACID) 500 MG Tab Take 500 mg by mouth 2 Times a Day.     • Guaifenesin 400 MG Tab Take 1 Tab by mouth 2 times a day as needed.       No facility-administered encounter medications on file as of 1/11/2019.      ROS.  See HPI.  In addition the patient does have mild reactive airways disease,some upper airway congestion, age-related joint pains and depression which is being addressed with Prozac with good results.  Patient is not suicidal.  All other system review responses are negative     Objective:   /76 (BP Location: Left arm, Patient Position: Sitting)   Pulse 64   Ht 1.626 m (5' 4\")   Wt 70.8 kg (156 lb)   LMP  (LMP Unknown)   SpO2 99%   BMI 26.78 kg/m²     Physical Exam   Exam:    Vitals:    01/11/19 1543   BP: 147/76   BP Location: Left arm   Patient Position: Sitting   Pulse: 64   SpO2: 99%   Weight: 70.8 kg (156 lb)   Height: 1.626 m (5' 4\")     Constitutional: Well developed, well nourished lady in no acute distress. Appears approximate stated age and provides a good history.   HEENT: Normocephalic, pupils are equal and responsive.No arcus. Conjunctiva and sclera are clear. No xanthelasma. No diagonal ear lobe crease " noted. Mucus membranes are moist and pink. Good oral hygiene  Neck: No JVD or HJR. JVP = 4-5cm H2O. Good carotid upstroke with no bruit. No lymphadenopathy, No thyroid enlargement.  Cardiovascular: Regular rhythm, no ectopics.  Normal S1 and S2.  No murmur, gallop, rub or click. No lift, heave,  thrill, or cardiomegaly  Lungs: Normal effort, Clear to auscultation and percussion. No rales, rhonchi, wheezing   Abdomen: Soft, non tender. No liver, kidney, spleen or mass palpable. The abdominal aorta is not enlarged.  Active bowel sounds are noted and there is no bruit  Extremities:  No cyanosis, edema, clubbing.  Easily palpable posterior tibial and dorsal pedal pulses bilaterally.   Skin:   Warm and dry, no active lesions.  Tattoo left ankle  Neurologic: Alert & oriented. Strength and sensation grossly intact. No lateralizing signs  Psychiatric:  Affect, mood, and judgement are appropriate    Assessment:     1. Essential hypertension     2. Dyslipidemia     3. RBBB         Medical Decision Making:  Today's Assessment / Status / Plan:   Patient has minimal risks of coronary artery disease with both hypertension and hyperlipidemia being addressed.  Treatment of her hyperlipidemia is important because her coronary artery scalp calcium score is not 0.  Her right bundle branch block is a chronic electrical quirk and carries no prognostic importance.  The cardiovascular risk for the proposed ENT surgery is acceptably low.  No further testing is warranted.  We discussed how to approach her blood pressure which seems to have a labile component.  Increasing amlodipine dose at night seems reasonable and the patient will try to establish a pattern with her blood pressure recordings.  She may return as needed or if requested to do so by Dr. Nicole.

## 2019-01-25 NOTE — OR NURSING
MEDICINE ADMISSION HISTORY AND PHYSICAL    Duane C Luick  : 1937 Sex: male MRN: [id number]  Date of Service: 2019       Chief Complaint:  Chest pain shortness of breath    History of Present Illness:       Duane C Luick is a 81 year old y.o. male history of multiple medical problems including anxiety, arthritis, atrial fibrillation, who presents to the ER with shortness of breath and chest pain. Patient recently had a cardiac stent placed 1 week prior. Patient reports chest pain and shortness of breath at rest. Patient denies any radiation of the pain. No alleviating factors no aggravating factors.  In the ER patient was admitted with shortness of breath and nonspecific chest pain, currently CT of the chest is pending to rule out PE, cardiology was called on consultation for further evaluation.     Medical/Surgical History :  Past Medical History:   Diagnosis Date   • Anesthesia complication     hypertension issues due to Pheochromocytoma/CA   • Anxiety    • Arthritis     spinal stenosis   • Atrial fibrillation (CMS/HCC)    • Shipley's esophagus 2017    repeat EGD in 3 months    • Benign neoplasm of adrenal gland 1997   • Bone metastases (CMS/HCC)    • Bowel obstruction (CMS/HCC) 2011    treated medically   • Bradycardia    • Bronchitis    • Cardiac pacemaker 2016    dual chamber; [employer]   • Cardiomyopathy (CMS/HCC) 10/2016    EF 45%        2017 EF 40%   • Cataracts, bilateral    • Chronic anticoagulation     with Eliquis   • Chronic kidney disease     stage lll   hx. renal artery stenosis   • Colon polyp 2017    tubular adenoma, microscopic colitis, recall in 5 years.    • Depression    • Dermatophytosis of nail    • DJD (degenerative joint disease)    • Dyslipidemia    • Esophageal reflux    • Fracture 2014    Rt. ankle   • Heart block AV second degree    • History of being tatooed     between shoulder blades   • Hypertension    • IFG (impaired fasting glucose)    •  Orders needed from Dr. Schaeffer for D/C and RX; unable to reach his answering service. Report given to Lena olguin; she is aware that Dr. Schaeffer is scrubbed in for his next surgical case. Pt transported to phase 2 in stable condition.   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 [facility]. Dr Jarrett   • Arthroscopy knee medial or lat Right 10/20/2016    Dr Boateng. [facility]   • Back surgery  12/04/2018    lumbar laminectomy L3-5   • Cardiac catheterization/possible ptca/possible stent  01/16/2019   • Colonoscopy  04/03/2017    polyps (tubular adenoma), lymphocytic colitis     dr powell   • Cryoablation Right     right pelvis lymph node; @ [facility] in [place], [place]   • Esophagogastroduodenoscopy  04/03/2017    dr powell   • Esophagogastroduodenoscopy  07/19/2017    dr powell   • Esophagogastroduodenoscopy  03/09/2018    Barretts esophagus, fundic gland polyps in stomach   • Eye surgery Bilateral 2016    laser   • Eye surgery      Bilateral cataract extraction with IOL implants   • Hernia repair  1971, 1973   • Ir epidural injection  2014    spinal stenosis   • Joint replacement  2008    Right THR   • Joint replacement  10/17/2017    RIght TKR   • Knee surgery Right     meniscus repair   • Pacemaker implant  02/03/2016    Dual chamber PPM-[facility]   • Renal artery bypass  1999    as per Dr. Austin   • Vascular surgery     • [place] tooth extraction         Psychosocial History:  Social History     Socioeconomic History   •  Marital status: /Civil Union     Spouse name: Not on file   • Number of children: Not on file   • Years of education: Not on file   • Highest education level: Not on file   Social Needs   • Financial resource strain: Not on file   • Food insecurity - worry: Not on file   • Food insecurity - inability: Not on file   • Transportation needs - medical: Not on file   • Transportation needs - non-medical: Not on file   Occupational History   • Occupation:    Tobacco Use   • Smoking status: Former Smoker     Packs/day: 0.50     Years: 20.00     Pack years: 10.00     Types: Cigarettes     Start date:      Last attempt to quit: 1980     Years since quittin.0   • Smokeless tobacco: Never Used   Substance and Sexual Activity   • Alcohol use: Yes     Alcohol/week: 4.2 - 10.8 oz     Types: 7 - 18 Standard drinks or equivalent per week     Comment: 1-2 martinis before dinner   • Drug use: No   • Sexual activity: Not on file   Other Topics Concern   •  Service Not Asked   • Blood Transfusions Not Asked   • Caffeine Concern Not Asked   • Occupational Exposure Not Asked   • Hobby Hazards Not Asked   • Sleep Concern Not Asked   • Stress Concern Not Asked   • Weight Concern Not Asked   • Special Diet Not Asked   • Back Care Not Asked   • Exercise Not Asked   • Bike Helmet Not Asked   • Seat Belt Yes   • Self-Exams Not Asked   Social History Narrative   • Not on file       Family History:  Family History   Problem Relation Age of Onset   • Arthritis Mother         self   • Heart disease Mother    • Arthritis Father    • Stroke Father    • Hypertension Father    • Heart disease Father    • Cancer Sister        Medications:    (Not in a hospital admission)    Allergies:    ALLERGIES:   Allergen Reactions   • Penicillins RASH   • Cyclobenzaprine RASH   • Bee Sting SWELLING   • Morphine Hcl SWELLING and PRURITUS     Leg swelling         Review of systems:  Constitutional:  no weight change or  fever  Eyes:  no vision loss or pain  Ears, Nose, Mouth, Throat:  Ears:   no hearing loss or ringing  Cardiovascular:   Positive chest pain or palpitations  Respiratory:  Positive shortness of breath or cough  Gastrointestinal:  no abdominal pain or change in bowel habits  Genitourinary:    Urinary:  no flank pain or urinary difficulty.  Musculoskeletal:  no joint pain or weakness  Integumentary (skin and/or breast):   Skin:  no rashes or skin changes  Neurologic:  no weakness or headache and no headaches or dizziness:    Psychiatric: no anxiety or depression   Endocrine:   Hematologic/Lymphatic:  no  enlarged lymph nodes  Allergic/Immunologic:  normal      Physical Exam:  Visit Vitals  BP (!) 176/106   Pulse 73   Temp 98.7 °F (37.1 °C) (Oral)   Resp 17   Ht 5' 9\" (1.753 m)   Wt 93.4 kg   SpO2 96%   BMI 30.42 kg/m²      Constitutional: General appearance:  alert, cooperative, and in no distress  Eyes:    PERRLA, eye lids clear, and sclera white  Ears, nose, mouth, and throat:   Ears:  external ears normal  Oropharynx: lips, mucosa, and tongue normal.  Cardiovascular:   Heart: regular rate and rhythm, S1, S2, no murmurs/rubs/gallops, PMI midline  Peripheral vascular: bilateral carotid, radial, femoral, DP and PT pulses are normal.  Respiratory:    chest symmetric, lungs clear bilaterally and no crackles, wheezes or rales  Gastrointestinal:  Abdominal:   normal active bowel sounds, no hepatosplenomegaly, no tenderness, no bruits and no pulsatile mass.  Musculoskeletal:  no cyanosis, clubbing or edema  Skin:  normal skin color, texture, and turgor.  No rashes or lesions.  Neurological:    Normal strength, reflexes symmetric, sensation intact, coordination normal, cranial nerves II-XII normal  Heme/lymph/immunologic:    no enlargement of cervical, supraclavicular, axillary or inguinal lymph nodes.  Psychiatric:  alert, oriented, cooperative, normal affect.    Labs:   CBC  WBC (K/mcL)   Date Value   01/24/2019 6.1    RBC  (mil/mcL)   Date Value   01/24/2019 3.85 (L)    HCT (%)   Date Value   01/24/2019 35.4 (L)    HGB (g/dL)   Date Value   01/24/2019 12.4 (L)    PLT   Date Value   01/24/2019 266 K/mcL   01/08/2014 199 K/mcL   07/02/2012 231 K/uL        BMP  Sodium (mmol/L)   Date Value   01/24/2019 140    Potassium (mmol/L)   Date Value   01/24/2019 3.0 (L)    Chloride (mmol/L)   Date Value   01/24/2019 103    Glucose (mg/dL)   Date Value   01/24/2019 144 (H)    CALCIUM (mg/dL)   Date Value   01/24/2019 8.9   07/02/2012 9.2    CO2 (mmol/L)   Date Value   07/02/2012 26     Carbon Dioxide (mmol/L)   Date Value   01/24/2019 23    BUN (mg/dL)   Date Value   01/24/2019 32 (H)    Creatinine (mg/dL)   Date Value   01/24/2019 1.16        Other Diagnostic Studies:   CT Chest PE Imaging    (Results Pending)       Assessment and Plan by Problem:  Duane C Luick is a 81 year old y.o. male history of multiple medical problems including anxiety, arthritis, atrial fibrillation, who presents to the ER with shortness of breath and chest pain.   In the ER patient was admitted with shortness of breath and nonspecific chest pain, currently CT of the chest is pending to rule out PE, cardiology was called on consultation for further evaluation.    Chest pain/shortness of breath  Coronary disease  Atrial fibrillation  Anxiety    Plan of care  Chest pain/shortness of breath  Initial EKG and troponins are negative  Cycling cardiac markers rule out ischemic event  Patient with recent cardiac stent placement 1 week prior  Cardiology on consult  Also ruling out PE CT chest pending    Coronary disease/atrial fibrillation  Continue outpatient therapy    DVT prophylaxis    Tobacco Best Practice  Nonsmoker          Dr. Antwan Lujan MD, 1/24/2019       Is the patient expected to require at least a two midnight stay in the hospital? Yes - I certify that I expect inpatient services for greater than two midnights are medically necessary for this patient. Please see H&P  and MD Progress Notes for additional information about the patient's course of treatment

## 2019-01-30 ENCOUNTER — OFFICE VISIT (OUTPATIENT)
Dept: MEDICAL GROUP | Facility: MEDICAL CENTER | Age: 64
End: 2019-01-30
Payer: COMMERCIAL

## 2019-01-30 VITALS
WEIGHT: 155.8 LBS | HEART RATE: 68 BPM | SYSTOLIC BLOOD PRESSURE: 128 MMHG | OXYGEN SATURATION: 97 % | RESPIRATION RATE: 16 BRPM | TEMPERATURE: 96.7 F | BODY MASS INDEX: 26.6 KG/M2 | HEIGHT: 64 IN | DIASTOLIC BLOOD PRESSURE: 66 MMHG

## 2019-01-30 DIAGNOSIS — I10 ESSENTIAL HYPERTENSION: ICD-10-CM

## 2019-01-30 DIAGNOSIS — J45.20 RAD (REACTIVE AIRWAY DISEASE), MILD INTERMITTENT, UNCOMPLICATED: ICD-10-CM

## 2019-01-30 DIAGNOSIS — F32.A DEPRESSION, UNSPECIFIED DEPRESSION TYPE: ICD-10-CM

## 2019-01-30 DIAGNOSIS — F33.41 RECURRENT MAJOR DEPRESSIVE DISORDER, IN PARTIAL REMISSION (HCC): ICD-10-CM

## 2019-01-30 DIAGNOSIS — E78.5 DYSLIPIDEMIA: ICD-10-CM

## 2019-01-30 DIAGNOSIS — J45.20 MILD INTERMITTENT ASTHMA WITHOUT COMPLICATION: ICD-10-CM

## 2019-01-30 PROCEDURE — 99214 OFFICE O/P EST MOD 30 MIN: CPT | Performed by: FAMILY MEDICINE

## 2019-01-30 RX ORDER — ATORVASTATIN CALCIUM 10 MG/1
10 TABLET, FILM COATED ORAL DAILY
Qty: 90 TAB | Refills: 3 | Status: SHIPPED | OUTPATIENT
Start: 2019-01-30 | End: 2019-02-11 | Stop reason: SDUPTHER

## 2019-01-30 RX ORDER — FLUOXETINE 10 MG/1
10 CAPSULE ORAL DAILY
Qty: 90 CAP | Refills: 3 | Status: SHIPPED | OUTPATIENT
Start: 2019-01-30 | End: 2020-03-27 | Stop reason: SDUPTHER

## 2019-01-30 RX ORDER — AMLODIPINE BESYLATE 5 MG/1
5 TABLET ORAL DAILY
Qty: 90 TAB | Refills: 3 | Status: SHIPPED | OUTPATIENT
Start: 2019-01-30 | End: 2019-02-20 | Stop reason: SDUPTHER

## 2019-01-30 RX ORDER — ALBUTEROL SULFATE 90 UG/1
2 AEROSOL, METERED RESPIRATORY (INHALATION) EVERY 6 HOURS PRN
Qty: 8.5 G | Refills: 3 | Status: SHIPPED | OUTPATIENT
Start: 2019-01-30 | End: 2023-12-13 | Stop reason: SDUPTHER

## 2019-01-30 ASSESSMENT — PATIENT HEALTH QUESTIONNAIRE - PHQ9: CLINICAL INTERPRETATION OF PHQ2 SCORE: 0

## 2019-01-30 NOTE — PROGRESS NOTES
CC: Hypertension, hyperlipidemia, asthma, depression    HPI:   Agatha presents today to discuss the following medical issues:    Essential hypertension  Has been adequately controlled on current medication. Denies headache, chest pain, and SOB.has been on Enalapril 20 mg, and Amlodipine 2.5 mg daily, and HCTZ 12.5 mg. No side effects.     Dyslipidemia  She has been tolerating the statin. Denies muscle pain LFTs has been normal, has been on Lipitor 10 mg daily.No h/o DM, Stroke, or CAD.     Mild persistent asthma without complication  She is currently asymptomatic.Has been getting seasonal episodes in the winter time, and sometimes gets exertional ( exercise) episodes. She is currently on Dulera bid, and Albuterol as needed.     Major depressive disorder with single episode, in partial remission (HCC)  Has been having normal mood, no suicidal ideation.She has been doing fine on the Prozac 10 mg daily.       Patient Active Problem List    Diagnosis Date Noted   • Recurrent major depressive disorder, in partial remission (HCC) 01/30/2019   • Mild intermittent asthma without complication 01/30/2019   • Carpal tunnel syndrome on right 11/12/2016   • Cervicalgia 09/14/2016   • White coat hypertension 04/04/2016   • Essential hypertension 04/04/2016   • Dyslipidemia 04/04/2016       Current Outpatient Prescriptions   Medication Sig Dispense Refill   • hydroCHLOROthiazide (HYDRODIURIL) 12.5 MG tablet Take 1 Tab by mouth every day. 90 Tab 3   • amLODIPine (NORVASC) 2.5 MG Tab Take 1 Tab by mouth every day. 90 Tab 3   • FLUoxetine (PROZAC) 10 MG Cap Take 1 Cap by mouth every day. 90 Cap 3   • atorvastatin (LIPITOR) 10 MG Tab Take 1 Tab by mouth every day. 90 Tab 3   • enalapril (VASOTEC) 20 MG tablet Take 1 Tab by mouth 2 times a day. 180 Tab 3   • albuterol 108 (90 BASE) MCG/ACT Aero Soln inhalation aerosol Inhale 2 Puffs by mouth every 6 hours as needed for Shortness of Breath. 8.5 g 3   • Mometasone Furo-Formoterol Fum  "(DULERA) 100-5 MCG/ACT Aerosol Inhale 2 Puffs by mouth 2 times a day as needed. 1 Inhaler 3   • TRAZODONE HCL PO Take  by mouth.     • Cranberry 300 MG Tab Take 1 Tab by mouth 2 Times a Day.     • Multiple Vitamins-Minerals (MULTIVITAMIN ADULT) Chew Tab Take 2 Tabs by mouth every day.     • Guaifenesin 400 MG Tab Take 1 Tab by mouth 2 times a day as needed.     • Diclofenac Sodium (PENNSAID TD) Apply 2 Pump to skin as directed 2 Times a Day.     • Calcium Carb-Cholecalciferol (CALCIUM 500 + D3 PO) Take 1 Tab by mouth 2 Times a Day.     • ascorbic acid (ASCORBIC ACID) 500 MG Tab Take 500 mg by mouth 2 Times a Day.       No current facility-administered medications for this visit.          Allergies as of 01/30/2019   • (No Known Allergies)        ROS: Denies any chest pain, Shortness of breath, Changes bowel or bladder, Lower extremity edema.    Physical Exam:  /66 (BP Location: Right arm, Patient Position: Sitting, BP Cuff Size: Adult)   Pulse 68   Temp 35.9 °C (96.7 °F) (Temporal)   Resp 16   Ht 1.626 m (5' 4\")   Wt 70.7 kg (155 lb 12.8 oz)   LMP  (LMP Unknown)   SpO2 97%   BMI 26.74 kg/m²   Gen.: Well-developed, well-nourished, no apparent distress,pleasant and cooperative with the examination  Skin:  Warm and dry with good turgor. No rashes or suspicious lesions in visible areas  HEENT:Sinuses nontender with palpation, TMs clear, nares patent with pink mucosa and clear rhinorrhea,no septal deviation ,polyps or lesions. lips without lesions, oropharynx clear.  Neck: Trachea midline,no masses or adenopathy. No JVD.  Cor: Regular rate and rhythm without murmur, gallop or rub.  Lungs: Respirations unlabored.Clear to auscultation with equal breath sounds bilaterally. No wheezes, rhonchi.  Extremities: No cyanosis, clubbing or edema.      Assessment and Plan.   63 y.o. female     1. Mild intermittent asthma without complication  Asymptomatic.  Continue on albuterol as needed.    2. Essential " hypertension  Adequately controlled.  However patient stated that she has been having high readings multiple times during the day.  Advised to increase amlodipine to 5 mg daily.  Continue hydrochlorothiazide 12.5 mg, and enalapril 20 mg daily.    3. Dyslipidemia  He has been tolerating the statin. Denies muscle pain LFTs has been normal  Continue on atorvastatin 10 mg daily.    - atorvastatin (LIPITOR) 10 MG Tab; Take 1 Tab by mouth every day.  Dispense: 90 Tab; Refill: 3    4. Recurrent major depressive disorder, in partial remission (HCC)  Patient has been doing fine on Prozac 10 mg, denies any side effects.  Denies any suicidal ideation.

## 2019-02-11 DIAGNOSIS — E78.5 DYSLIPIDEMIA: ICD-10-CM

## 2019-02-11 RX ORDER — ENALAPRIL MALEATE 20 MG/1
20 TABLET ORAL 2 TIMES DAILY
Qty: 180 TAB | Refills: 3 | Status: SHIPPED | OUTPATIENT
Start: 2019-02-11 | End: 2019-08-14

## 2019-02-11 RX ORDER — ATORVASTATIN CALCIUM 10 MG/1
10 TABLET, FILM COATED ORAL DAILY
Qty: 90 TAB | Refills: 3 | Status: SHIPPED | OUTPATIENT
Start: 2019-02-11 | End: 2019-03-04 | Stop reason: SDUPTHER

## 2019-02-17 ENCOUNTER — PATIENT MESSAGE (OUTPATIENT)
Dept: MEDICAL GROUP | Facility: MEDICAL CENTER | Age: 64
End: 2019-02-17

## 2019-02-19 NOTE — TELEPHONE ENCOUNTER
From: Agatha Fritz  To: Mila Nicole M.D.  Sent: 2/17/2019 7:37 AM PST  Subject: Prescription Question    Again, My Chart shows no medications. I am unable to even request renewals/refills through My Chart. I checked with [facility] mail [facility], and they have shipped enalapril. I have requested they now ship Dulera, Proair, fluoxetine. I see they have amlodipine 5 mg. I have asked that they ship this, even though I requested this RX go to [facility] pharmacy, because there is zero copay. I will see how much [facility] charges me for amlodipine 5 mg THIS time, but in future I may again request this med be provided by [facility] pharmacy, because nothing beats a zero-dollar out-of-pocket expense, RIGHT?

## 2019-02-20 ENCOUNTER — PATIENT MESSAGE (OUTPATIENT)
Dept: MEDICAL GROUP | Facility: MEDICAL CENTER | Age: 64
End: 2019-02-20

## 2019-02-20 DIAGNOSIS — I10 ESSENTIAL HYPERTENSION: ICD-10-CM

## 2019-02-20 RX ORDER — AMLODIPINE BESYLATE 5 MG/1
5 TABLET ORAL DAILY
Qty: 90 TAB | Refills: 3 | Status: SHIPPED
Start: 2019-02-20 | End: 2020-01-17 | Stop reason: SDUPTHER

## 2019-02-20 NOTE — TELEPHONE ENCOUNTER
From: Agatha Fritz  To: Mila Nicole M.D.  Sent: 2/20/2019 7:58 AM PST  Subject: Prescription Question    Please send new RX for amlodipine (5 mg? changed dose) to [facility] pharmacy. I believe [facility] is sending all other med renewals via snail mail.    Thanks you.  ----- Message -----  From: Mila Nicole M.D.  Sent: 2/19/2019 12:48 PM PST  To: Agatha Fritz  Subject: RE: Prescription Question  What medication do you need to refill, and to which pharmacy?    ----- Message -----   From: Agatha Fritz   Sent: 2/17/2019 7:37 AM PST   To: Mila Nicole M.D.  Subject: Prescription Question    Again, My Chart shows no medications. I am unable to even request renewals/refills through My Chart. I checked with [facility], and they have shipped enalapril. I have requested they now ship Dulera, Proair, fluoxetine. I see they have amlodipine 5 mg. I have asked that they ship this, even though I requested this RX go to [facility] pharmacy, because there is zero copay. I will see how much [facility] charges me for amlodipine 5 mg THIS time, but in future I may again request this med be provided by [facility] pharmacy, because nothing beats a zero-dollar out-of-pocket expense, RIGHT?

## 2019-03-02 ENCOUNTER — PATIENT MESSAGE (OUTPATIENT)
Dept: MEDICAL GROUP | Facility: MEDICAL CENTER | Age: 64
End: 2019-03-02

## 2019-03-02 DIAGNOSIS — E78.5 DYSLIPIDEMIA: ICD-10-CM

## 2019-03-04 RX ORDER — ATORVASTATIN CALCIUM 10 MG/1
10 TABLET, FILM COATED ORAL DAILY
Qty: 90 TAB | Refills: 3 | Status: SHIPPED | OUTPATIENT
Start: 2019-03-04 | End: 2020-03-27 | Stop reason: SDUPTHER

## 2019-03-04 NOTE — TELEPHONE ENCOUNTER
"From: Agatha Fritz  To: Mila Nicole M.D.  Sent: 3/2/2019 9:31 AM PST  Subject: Prescription Question    My mistake. This is what happens from polypharmacy. Two medications beginning with \"A\" and me in a hurry, trying to get meds filled.     I request that a prescription of atorvaSTATIN 10 mg be sent to [facility], phone [phone]. My prior RX was from Dr. Salazar, and there are now zero refills.    Thank you,  Agatha Fritz  "

## 2019-04-26 ENCOUNTER — HOSPITAL ENCOUNTER (OUTPATIENT)
Dept: RADIOLOGY | Facility: MEDICAL CENTER | Age: 64
End: 2019-04-26
Attending: FAMILY MEDICINE
Payer: COMMERCIAL

## 2019-04-26 DIAGNOSIS — Z12.31 VISIT FOR SCREENING MAMMOGRAM: ICD-10-CM

## 2019-04-26 PROCEDURE — 77063 BREAST TOMOSYNTHESIS BI: CPT

## 2019-05-09 ENCOUNTER — APPOINTMENT (OUTPATIENT)
Dept: RADIOLOGY | Facility: IMAGING CENTER | Age: 64
End: 2019-05-09
Attending: EMERGENCY MEDICINE
Payer: COMMERCIAL

## 2019-05-09 ENCOUNTER — OFFICE VISIT (OUTPATIENT)
Dept: URGENT CARE | Facility: CLINIC | Age: 64
End: 2019-05-09
Payer: COMMERCIAL

## 2019-05-09 VITALS
HEIGHT: 64 IN | OXYGEN SATURATION: 96 % | SYSTOLIC BLOOD PRESSURE: 130 MMHG | HEART RATE: 71 BPM | WEIGHT: 155 LBS | DIASTOLIC BLOOD PRESSURE: 84 MMHG | RESPIRATION RATE: 16 BRPM | BODY MASS INDEX: 26.46 KG/M2 | TEMPERATURE: 98 F

## 2019-05-09 DIAGNOSIS — J45.40 MODERATE PERSISTENT ASTHMA WITHOUT COMPLICATION: ICD-10-CM

## 2019-05-09 DIAGNOSIS — R05.9 COUGH: ICD-10-CM

## 2019-05-09 PROCEDURE — 71046 X-RAY EXAM CHEST 2 VIEWS: CPT | Mod: TC | Performed by: EMERGENCY MEDICINE

## 2019-05-09 PROCEDURE — 99214 OFFICE O/P EST MOD 30 MIN: CPT | Performed by: EMERGENCY MEDICINE

## 2019-05-09 RX ORDER — PREDNISONE 20 MG/1
40 TABLET ORAL DAILY
Qty: 10 TAB | Refills: 0 | Status: SHIPPED | OUTPATIENT
Start: 2019-05-09 | End: 2019-08-14

## 2019-05-09 RX ORDER — INHALER, ASSIST DEVICES
1 SPACER (EA) MISCELLANEOUS ONCE
Qty: 1 EACH | Refills: 0 | Status: SHIPPED | OUTPATIENT
Start: 2019-05-09 | End: 2019-05-09

## 2019-05-09 ASSESSMENT — ENCOUNTER SYMPTOMS
HEMOPTYSIS: 0
HEARTBURN: 0
PALPITATIONS: 0
COUGH: 1
SHORTNESS OF BREATH: 0
FEVER: 0
RHINORRHEA: 1
SORE THROAT: 0
WHEEZING: 1
SINUS PAIN: 0
CHILLS: 0

## 2019-05-10 NOTE — PROGRESS NOTES
Subjective:      Agatha Fritz is a 64 y.o. female who presents with Cough (x1 month, productive for x2 weeks, yellow mucus)            Cough   This is a new problem. The current episode started more than 1 month ago. The problem has been waxing and waning. The cough is productive of sputum. Associated symptoms include nasal congestion, rhinorrhea and wheezing. Pertinent negatives include no chest pain, chills, ear pain, fever, heartburn, hemoptysis, postnasal drip, sore throat or shortness of breath. She has tried a beta-agonist inhaler and steroid inhaler for the symptoms. The treatment provided mild relief. Her past medical history is significant for asthma and environmental allergies.       Review of Systems   Constitutional: Negative for chills and fever.   HENT: Positive for congestion, nosebleeds and rhinorrhea. Negative for ear pain, hearing loss, postnasal drip, sinus pain and sore throat.    Respiratory: Positive for cough and wheezing. Negative for hemoptysis and shortness of breath.    Cardiovascular: Negative for chest pain, palpitations and leg swelling.   Gastrointestinal: Negative for heartburn.   Endo/Heme/Allergies: Positive for environmental allergies.     PMH:  has a past medical history of Anesthesia; Arthritis; Asthma; Bronchitis (2014); Dyslipidemia (4/4/2016); High cholesterol; Hypertension; and Psychiatric problem (9/13/2016).  MEDS:   Current Outpatient Prescriptions:   •  atorvastatin (LIPITOR) 10 MG Tab, Take 1 Tab by mouth every day., Disp: 90 Tab, Rfl: 3  •  amLODIPine (NORVASC) 5 MG Tab, Take 1 Tab by mouth every day., Disp: 90 Tab, Rfl: 3  •  enalapril (VASOTEC) 20 MG tablet, Take 1 Tab by mouth 2 times a day., Disp: 180 Tab, Rfl: 3  •  albuterol 108 (90 Base) MCG/ACT Aero Soln inhalation aerosol, Inhale 2 Puffs by mouth every 6 hours as needed for Shortness of Breath., Disp: 8.5 g, Rfl: 3  •  Mometasone Furo-Formoterol Fum (DULERA) 100-5 MCG/ACT Aerosol, Inhale 2 Puffs by  mouth 2 times a day as needed., Disp: 1 Inhaler, Rfl: 3  •  FLUoxetine (PROZAC) 10 MG Cap, Take 1 Cap by mouth every day., Disp: 90 Cap, Rfl: 3  •  TRAZODONE HCL PO, Take  by mouth., Disp: , Rfl:   •  Cranberry 300 MG Tab, Take 1 Tab by mouth 2 Times a Day., Disp: , Rfl:   •  Multiple Vitamins-Minerals (MULTIVITAMIN ADULT) Chew Tab, Take 2 Tabs by mouth every day., Disp: , Rfl:   •  Guaifenesin 400 MG Tab, Take 1 Tab by mouth 2 times a day as needed., Disp: , Rfl:   •  hydroCHLOROthiazide (HYDRODIURIL) 12.5 MG tablet, Take 1 Tab by mouth every day., Disp: 90 Tab, Rfl: 3  •  Calcium Carb-Cholecalciferol (CALCIUM 500 + D3 PO), Take 1 Tab by mouth 2 Times a Day., Disp: , Rfl:   •  ascorbic acid (ASCORBIC ACID) 500 MG Tab, Take 500 mg by mouth 2 Times a Day., Disp: , Rfl:   •  Diclofenac Sodium (PENNSAID TD), Apply 2 Pump to skin as directed 2 Times a Day., Disp: , Rfl:   ALLERGIES: No Known Allergies  SURGHX:   Past Surgical History:   Procedure Laterality Date   • COLONOSCOPY  09/2018   • CARPAL TUNNEL RELEASE Left 11/11/2017    Procedure: CARPAL TUNNEL RELEASE;  Surgeon: Woody Schaeffer M.D.;  Location: [facility];  Service: Neurosurgery   • CARPAL TUNNEL RELEASE Right 11/12/2016    Procedure: CARPAL TUNNEL RELEASE;  Surgeon: Woody Schaeffer M.D.;  Location: [facility];  Service:    • PB INJ CERV/THORAC,W/WO CNTRST  9/14/2016    Procedure: INJ EPI NON NEUROLYTIC C/T - C6-7;  Surgeon: Don Padron M.D.;  Location: [facility];  Service: Pain Management   • PB FLUORSCOPIC GUIDANCE SPINAL INJECTION  9/14/2016    Procedure: FLUOROGUIDE FOR SPINAL INJ;  Surgeon: Don Padron M.D.;  Location: [facility];  Service: Pain Management   • BREAST BIOPSY  2002    R breast, needle biopsy-Negative   • LYMPH NODE EXCISION Left 2002    Left groin-negative   • ACL RECONSTRUCTION W/ ALLOGRAFT Left 1995    MD Keshav Steven.    • TONSILLECTOMY  as child     SOCHX:  reports  "that she quit smoking about 30 years ago. Her smoking use included Cigarettes. She has a 15.00 pack-year smoking history. She has never used smokeless tobacco. She reports that she drinks alcohol. She reports that she does not use drugs.  FH: family history includes Diabetes in her maternal grandmother; Hypertension in her father and son.       Objective:     /84   Pulse 71   Temp 36.7 °C (98 °F)   Resp 16   Ht 1.626 m (5' 4\")   Wt 70.3 kg (155 lb)   LMP  (LMP Unknown)   SpO2 96%   BMI 26.61 kg/m²      Physical Exam   Constitutional: She is oriented to person, place, and time. She appears well-developed and well-nourished. She is cooperative. She does not appear ill. No distress.   HENT:   Head: Normocephalic.   Right Ear: Tympanic membrane and ear canal normal.   Left Ear: Tympanic membrane and ear canal normal.   Nose: Mucosal edema and septal deviation present. No rhinorrhea.   Mouth/Throat: Uvula is midline and oropharynx is clear and moist.   Eyes: Conjunctivae and lids are normal.   Neck: Trachea normal and phonation normal. Neck supple. No JVD present.   Cardiovascular: Normal rate, regular rhythm and normal heart sounds.    No murmur heard.  Pulmonary/Chest: Effort normal. She has no decreased breath sounds. She has no wheezes. She has no rhonchi. She has no rales.   Lymphadenopathy:     She has no cervical adenopathy.   Neurological: She is alert and oriented to person, place, and time.   Skin: Skin is warm and dry.   Psychiatric: She has a normal mood and affect.               Assessment/Plan:     1. Moderate persistent asthma without complication  Continue Dulera.  - Spacer/Aero-Holding Chambers (AEROCHAMBER MV) Misc; 1 Each by Does not apply route Once for 1 dose.  Dispense: 1 Each; Refill: 0  - ipratropium-albuterol (COMBIVENT RESPIMAT)  MCG/ACT Aero Soln; Inhale 1 Puff by mouth 4 times a day.  Dispense: 1 Inhaler; Refill: 0  - predniSONE (DELTASONE) 20 MG Tab; Take 2 Tabs by mouth " every day.  Dispense: 10 Tab; Refill: 0  REF PCP  2. Cough  - DX-CHEST-2 VIEWS; per radiologist:  No evidence of acute cardiopulmonary process.

## 2019-06-13 ENCOUNTER — PATIENT MESSAGE (OUTPATIENT)
Dept: MEDICAL GROUP | Facility: MEDICAL CENTER | Age: 64
End: 2019-06-13

## 2019-06-13 ENCOUNTER — OFFICE VISIT (OUTPATIENT)
Dept: URGENT CARE | Facility: CLINIC | Age: 64
End: 2019-06-13
Payer: COMMERCIAL

## 2019-06-13 VITALS
SYSTOLIC BLOOD PRESSURE: 122 MMHG | DIASTOLIC BLOOD PRESSURE: 74 MMHG | HEART RATE: 60 BPM | OXYGEN SATURATION: 98 % | BODY MASS INDEX: 26.46 KG/M2 | RESPIRATION RATE: 16 BRPM | WEIGHT: 155 LBS | TEMPERATURE: 97.8 F | HEIGHT: 64 IN

## 2019-06-13 DIAGNOSIS — J06.9 UPPER RESPIRATORY TRACT INFECTION, UNSPECIFIED TYPE: ICD-10-CM

## 2019-06-13 LAB
INT CON NEG: NEGATIVE
INT CON POS: POSITIVE
S PYO AG THROAT QL: NEGATIVE

## 2019-06-13 PROCEDURE — 87880 STREP A ASSAY W/OPTIC: CPT | Performed by: PHYSICIAN ASSISTANT

## 2019-06-13 PROCEDURE — 99214 OFFICE O/P EST MOD 30 MIN: CPT | Performed by: PHYSICIAN ASSISTANT

## 2019-06-13 RX ORDER — AMOXICILLIN AND CLAVULANATE POTASSIUM 875; 125 MG/1; MG/1
1 TABLET, FILM COATED ORAL 2 TIMES DAILY
Qty: 14 TAB | Refills: 0 | Status: SHIPPED | OUTPATIENT
Start: 2019-06-13 | End: 2019-06-20

## 2019-06-13 ASSESSMENT — ENCOUNTER SYMPTOMS
PALPITATIONS: 0
COUGH: 1
SORE THROAT: 1
TROUBLE SWALLOWING: 1
SPUTUM PRODUCTION: 0
HEMOPTYSIS: 0
WHEEZING: 0
FEVER: 0
SHORTNESS OF BREATH: 0
CHILLS: 0

## 2019-06-13 NOTE — PATIENT INSTRUCTIONS
"Upper Respiratory Infection, Adult  Most upper respiratory infections (URIs) are a viral infection of the air passages leading to the lungs. A URI affects the nose, throat, and upper air passages. The most common type of URI is nasopharyngitis and is typically referred to as \"the common cold.\"  URIs run their course and usually go away on their own. Most of the time, a URI does not require medical attention, but sometimes a bacterial infection in the upper airways can follow a viral infection. This is called a secondary infection. Sinus and middle ear infections are common types of secondary upper respiratory infections.  Bacterial pneumonia can also complicate a URI. A URI can worsen asthma and chronic obstructive pulmonary disease (COPD). Sometimes, these complications can require emergency medical care and may be life threatening.  What are the causes?  Almost all URIs are caused by viruses. A virus is a type of germ and can spread from one person to another.  What increases the risk?  You may be at risk for a URI if:  · You smoke.  · You have chronic heart or lung disease.  · You have a weakened defense (immune) system.  · You are very young or very old.  · You have nasal allergies or asthma.  · You work in crowded or poorly ventilated areas.  · You work in health care facilities or schools.  What are the signs or symptoms?  Symptoms typically develop 2-3 days after you come in contact with a cold virus. Most viral URIs last 7-10 days. However, viral URIs from the influenza virus (flu virus) can last 14-18 days and are typically more severe. Symptoms may include:  · Runny or stuffy (congested) nose.  · Sneezing.  · Cough.  · Sore throat.  · Headache.  · Fatigue.  · Fever.  · Loss of appetite.  · Pain in your forehead, behind your eyes, and over your cheekbones (sinus pain).  · Muscle aches.  How is this diagnosed?  Your health care provider may diagnose a URI by:  · Physical exam.  · Tests to check that your " symptoms are not due to another condition such as:  ¨ Strep throat.  ¨ Sinusitis.  ¨ Pneumonia.  ¨ Asthma.  How is this treated?  A URI goes away on its own with time. It cannot be cured with medicines, but medicines may be prescribed or recommended to relieve symptoms. Medicines may help:  · Reduce your fever.  · Reduce your cough.  · Relieve nasal congestion.  Follow these instructions at home:  · Take medicines only as directed by your health care provider.  · Gargle warm saltwater or take cough drops to comfort your throat as directed by your health care provider.  · Use a warm mist humidifier or inhale steam from a shower to increase air moisture. This may make it easier to breathe.  · Drink enough fluid to keep your urine clear or pale yellow.  · Eat soups and other clear broths and maintain good nutrition.  · Rest as needed.  · Return to work when your temperature has returned to normal or as your health care provider advises. You may need to stay home longer to avoid infecting others. You can also use a face mask and careful hand washing to prevent spread of the virus.  · Increase the usage of your inhaler if you have asthma.  · Do not use any tobacco products, including cigarettes, chewing tobacco, or electronic cigarettes. If you need help quitting, ask your health care provider.  How is this prevented?  The best way to protect yourself from getting a cold is to practice good hygiene.  · Avoid oral or hand contact with people with cold symptoms.  · Wash your hands often if contact occurs.  There is no clear evidence that vitamin C, vitamin E, echinacea, or exercise reduces the chance of developing a cold. However, it is always recommended to get plenty of rest, exercise, and practice good nutrition.  Contact a health care provider if:  · You are getting worse rather than better.  · Your symptoms are not controlled by medicine.  · You have chills.  · You have worsening shortness of breath.  · You have brown  or red mucus.  · You have yellow or brown nasal discharge.  · You have pain in your face, especially when you bend forward.  · You have a fever.  · You have swollen neck glands.  · You have pain while swallowing.  · You have white areas in the back of your throat.  Get help right away if:  · You have severe or persistent:  ¨ Headache.  ¨ Ear pain.  ¨ Sinus pain.  ¨ Chest pain.  · You have chronic lung disease and any of the following:  ¨ Wheezing.  ¨ Prolonged cough.  ¨ Coughing up blood.  ¨ A change in your usual mucus.  · You have a stiff neck.  · You have changes in your:  ¨ Vision.  ¨ Hearing.  ¨ Thinking.  ¨ Mood.  This information is not intended to replace advice given to you by your health care provider. Make sure you discuss any questions you have with your health care provider.  Document Released: 06/13/2002 Document Revised: 08/20/2017 Document Reviewed: 03/25/2015  [employer] Interactive Patient Education © 2017 [employer]

## 2019-06-13 NOTE — PROGRESS NOTES
Subjective:      Agatha Fritz is a 64 y.o. female who presents with Sore Throat (x4 days, sore throat, sinus pressure and congestion, (R) earache, fever, bodyaches, chills)            Pharyngitis    This is a new problem. The current episode started in the past 7 days. The problem has been unchanged. Associated symptoms include congestion, coughing and trouble swallowing. Pertinent negatives include no ear pain or shortness of breath. She has had exposure to strep. She has tried NSAIDs for the symptoms. The treatment provided moderate relief.       Review of Systems   Constitutional: Positive for malaise/fatigue. Negative for chills and fever.   HENT: Positive for congestion, sore throat and trouble swallowing. Negative for ear pain.    Respiratory: Positive for cough. Negative for hemoptysis, sputum production, shortness of breath and wheezing.    Cardiovascular: Negative for chest pain and palpitations.   All other systems reviewed and are negative.    PMH:  has a past medical history of Anesthesia; Arthritis; Asthma; Bronchitis (2014); Dyslipidemia (4/4/2016); High cholesterol; Hypertension; and Psychiatric problem (9/13/2016).  MEDS:   Current Outpatient Prescriptions:   •  amoxicillin-clavulanate (AUGMENTIN) 875-125 MG Tab, Take 1 Tab by mouth 2 times a day for 7 days., Disp: 14 Tab, Rfl: 0  •  ipratropium-albuterol (COMBIVENT RESPIMAT)  MCG/ACT Aero Soln, Inhale 1 Puff by mouth 4 times a day., Disp: 1 Inhaler, Rfl: 0  •  atorvastatin (LIPITOR) 10 MG Tab, Take 1 Tab by mouth every day., Disp: 90 Tab, Rfl: 3  •  amLODIPine (NORVASC) 5 MG Tab, Take 1 Tab by mouth every day., Disp: 90 Tab, Rfl: 3  •  enalapril (VASOTEC) 20 MG tablet, Take 1 Tab by mouth 2 times a day., Disp: 180 Tab, Rfl: 3  •  Mometasone Furo-Formoterol Fum (DULERA) 100-5 MCG/ACT Aerosol, Inhale 2 Puffs by mouth 2 times a day as needed., Disp: 1 Inhaler, Rfl: 3  •  FLUoxetine (PROZAC) 10 MG Cap, Take 1 Cap by mouth every day.,  Disp: 90 Cap, Rfl: 3  •  Cranberry 300 MG Tab, Take 1 Tab by mouth 2 Times a Day., Disp: , Rfl:   •  Multiple Vitamins-Minerals (MULTIVITAMIN ADULT) Chew Tab, Take 2 Tabs by mouth every day., Disp: , Rfl:   •  Guaifenesin 400 MG Tab, Take 1 Tab by mouth 2 times a day as needed., Disp: , Rfl:   •  Diclofenac Sodium (PENNSAID TD), Apply 2 Pump to skin as directed 2 Times a Day., Disp: , Rfl:   •  hydroCHLOROthiazide (HYDRODIURIL) 12.5 MG tablet, Take 1 Tab by mouth every day., Disp: 90 Tab, Rfl: 3  •  Calcium Carb-Cholecalciferol (CALCIUM 500 + D3 PO), Take 1 Tab by mouth 2 Times a Day., Disp: , Rfl:   •  ascorbic acid (ASCORBIC ACID) 500 MG Tab, Take 500 mg by mouth 2 Times a Day., Disp: , Rfl:   •  predniSONE (DELTASONE) 20 MG Tab, Take 2 Tabs by mouth every day. (Patient not taking: Reported on 6/13/2019), Disp: 10 Tab, Rfl: 0  •  albuterol 108 (90 Base) MCG/ACT Aero Soln inhalation aerosol, Inhale 2 Puffs by mouth every 6 hours as needed for Shortness of Breath., Disp: 8.5 g, Rfl: 3  •  TRAZODONE HCL PO, Take  by mouth., Disp: , Rfl:   ALLERGIES: No Known Allergies  SURGHX:   Past Surgical History:   Procedure Laterality Date   • COLONOSCOPY  09/2018   • CARPAL TUNNEL RELEASE Left 11/11/2017    Procedure: CARPAL TUNNEL RELEASE;  Surgeon: Woody Schaeffer M.D.;  Location: SURGERY [facility];  Service: Neurosurgery   • CARPAL TUNNEL RELEASE Right 11/12/2016    Procedure: CARPAL TUNNEL RELEASE;  Surgeon: Woody Schaeffer M.D.;  Location: SURGERY [facility];  Service:    • PB INJ CERV/THORAC,W/WO CNTRST  9/14/2016    Procedure: INJ EPI NON NEUROLYTIC C/T - C6-7;  Surgeon: Don Padron M.D.;  Location: SURGERY [facility];  Service: Pain Management   • PB FLUORSCOPIC GUIDANCE SPINAL INJECTION  9/14/2016    Procedure: FLUOROGUIDE FOR SPINAL INJ;  Surgeon: Don Padron M.D.;  [facility]: [facility];  Service: Pain Management   • BREAST BIOPSY  2002    R breast, needle biopsy-Negative   • LYMPH  "NODE EXCISION Left 2002    Left groin-negative   • ACL RECONSTRUCTION W/ ALLOGRAFT Left 1995    MD Keshav Steven.    • TONSILLECTOMY  as child     SOCHX:  reports that she quit smoking about 30 years ago. Her smoking use included Cigarettes. She has a 15.00 pack-year smoking history. She has never used smokeless tobacco. She reports that she drinks alcohol. She reports that she does not use drugs.  FH: Family history was reviewed, no pertinent findings to report  Medications, Allergies, and current problem list reviewed today in Epic     Objective:     /74   Pulse 60   Temp 36.6 °C (97.8 °F) (Temporal)   Resp 16   Ht 1.626 m (5' 4\")   Wt 70.3 kg (155 lb)   LMP  (LMP Unknown)   SpO2 98%   BMI 26.61 kg/m²      Physical Exam   Constitutional: She is oriented to person, place, and time. She appears well-developed and well-nourished. She is active.  Non-toxic appearance. She does not have a sickly appearance. She does not appear ill. No distress. She is not intubated.   HENT:   Head: Normocephalic and atraumatic.   Right Ear: Hearing, tympanic membrane, external ear and ear canal normal.   Left Ear: Hearing, tympanic membrane, external ear and ear canal normal.   Nose: Nose normal.   Mouth/Throat: Uvula is midline, oropharynx is clear and moist and mucous membranes are normal.   Eyes: Conjunctivae, EOM and lids are normal.   Neck: Normal range of motion and full passive range of motion without pain. Neck supple.   Cardiovascular: Regular rhythm, S1 normal, S2 normal and normal heart sounds.  Exam reveals no gallop and no friction rub.    No murmur heard.  Pulmonary/Chest: Effort normal and breath sounds normal. No accessory muscle usage. No apnea, no tachypnea and no bradypnea. She is not intubated. No respiratory distress. She has no decreased breath sounds. She has no wheezes. She has no rhonchi. She has no rales. She exhibits no tenderness.   Musculoskeletal: Normal range of motion. "   Neurological: She is alert and oriented to person, place, and time.   Skin: Skin is warm and dry.   Psychiatric: She has a normal mood and affect. Her speech is normal and behavior is normal. Judgment and thought content normal.   Vitals reviewed.            Rapid Strep:NEG  Assessment/Plan:   Discussed likely viral etiology.  Contingent antibiotic prescription given to patient to fill upon meeting criteria of guidelines discussed.     1. Upper respiratory tract infection, unspecified type    - POCT Rapid Strep A  - amoxicillin-clavulanate (AUGMENTIN) 875-125 MG Tab; Take 1 Tab by mouth 2 times a day for 7 days.  Dispense: 14 Tab; Refill: 0    Differential diagnosis, natural history, supportive care discussed. Follow-up with primary care provider within 7-10 days, emergency room precautions discussed.  Patient and/or family appears understanding of information.  Handout and review of patients diagnosis and treatment was discussed extensively.

## 2019-08-14 ENCOUNTER — OFFICE VISIT (OUTPATIENT)
Dept: MEDICAL GROUP | Facility: MEDICAL CENTER | Age: 64
End: 2019-08-14
Payer: COMMERCIAL

## 2019-08-14 VITALS
BODY MASS INDEX: 26.84 KG/M2 | TEMPERATURE: 98.2 F | HEIGHT: 64 IN | SYSTOLIC BLOOD PRESSURE: 114 MMHG | HEART RATE: 60 BPM | WEIGHT: 157.2 LBS | DIASTOLIC BLOOD PRESSURE: 70 MMHG | OXYGEN SATURATION: 99 %

## 2019-08-14 DIAGNOSIS — J34.2 DEVIATED SEPTUM: ICD-10-CM

## 2019-08-14 DIAGNOSIS — E78.5 DYSLIPIDEMIA: ICD-10-CM

## 2019-08-14 DIAGNOSIS — I10 ESSENTIAL HYPERTENSION: ICD-10-CM

## 2019-08-14 DIAGNOSIS — F33.41 RECURRENT MAJOR DEPRESSIVE DISORDER, IN PARTIAL REMISSION (HCC): ICD-10-CM

## 2019-08-14 PROCEDURE — 99214 OFFICE O/P EST MOD 30 MIN: CPT | Performed by: FAMILY MEDICINE

## 2019-08-14 RX ORDER — LOSARTAN POTASSIUM 50 MG/1
50 TABLET ORAL DAILY
Qty: 30 TAB | Refills: 3 | Status: SHIPPED | OUTPATIENT
Start: 2019-08-14 | End: 2019-09-23 | Stop reason: SDUPTHER

## 2019-08-14 NOTE — PROGRESS NOTES
CC: Hypertension, hyperlipidemia, depression    HPI:   Agatha presents today discuss the following medical issues:    Essential hypertension  Has been adequately controlled on current medication. Denies headache, chest pain, and SOB.however patient reported a chronic dry cough, not associated with symptoms of asthma.  She has been on Enalapril 20 mg, and Amlodipine 2.5 mg daily, and HCTZ 12.5 mg.      Dyslipidemia  She has been tolerating the statin. Denies muscle pain LFTs has been normal, has been on Lipitor 10 mg daily.No h/o DM, Stroke, or CAD.  Last lipid panel was checked in 9/2018:  Cholesterol,Tot 100 - 199 mg/dL 205High     Triglycerides 0 - 149 mg/dL 190High     HDL >=40 mg/dL 77    LDL <100 mg/dL 90        Recurrent major depressive disorder, in partial remission (HCC)  Has been having normal mood, no suicidal ideation.She has been doing fine on the Prozac 10 mg daily.    Deviated septum  It has been a chronic issue, lately it has been causing problems e.g. snoring, patient was seen by an ENT who recommended corrective surgery, however the surgery was canceled after an EKG showed bradycardia and right bundle branch block, however patient was seen by cardiology who cleared her.  However patient went back to the same ENT who does not want to do the surgery.             Patient Active Problem List    Diagnosis Date Noted   • Recurrent major depressive disorder, in partial remission (HCC) 01/30/2019   • Mild intermittent asthma without complication 01/30/2019   • Carpal tunnel syndrome on right 11/12/2016   • Cervicalgia 09/14/2016   • White coat hypertension 04/04/2016   • Essential hypertension 04/04/2016   • Dyslipidemia 04/04/2016       Current Outpatient Medications   Medication Sig Dispense Refill   • ipratropium-albuterol (COMBIVENT RESPIMAT)  MCG/ACT Aero Soln Inhale 1 Puff by mouth 4 times a day. 1 Inhaler 0   • predniSONE (DELTASONE) 20 MG Tab Take 2 Tabs by mouth every day. (Patient not  taking: Reported on 6/13/2019) 10 Tab 0   • atorvastatin (LIPITOR) 10 MG Tab Take 1 Tab by mouth every day. 90 Tab 3   • amLODIPine (NORVASC) 5 MG Tab Take 1 Tab by mouth every day. 90 Tab 3   • enalapril (VASOTEC) 20 MG tablet Take 1 Tab by mouth 2 times a day. 180 Tab 3   • albuterol 108 (90 Base) MCG/ACT Aero Soln inhalation aerosol Inhale 2 Puffs by mouth every 6 hours as needed for Shortness of Breath. 8.5 g 3   • Mometasone Furo-Formoterol Fum (DULERA) 100-5 MCG/ACT Aerosol Inhale 2 Puffs by mouth 2 times a day as needed. 1 Inhaler 3   • FLUoxetine (PROZAC) 10 MG Cap Take 1 Cap by mouth every day. 90 Cap 3   • TRAZODONE HCL PO Take  by mouth.     • Cranberry 300 MG Tab Take 1 Tab by mouth 2 Times a Day.     • Multiple Vitamins-Minerals (MULTIVITAMIN ADULT) Chew Tab Take 2 Tabs by mouth every day.     • Guaifenesin 400 MG Tab Take 1 Tab by mouth 2 times a day as needed.     • Diclofenac Sodium (PENNSAID TD) Apply 2 Pump to skin as directed 2 Times a Day.     • hydroCHLOROthiazide (HYDRODIURIL) 12.5 MG tablet Take 1 Tab by mouth every day. 90 Tab 3   • Calcium Carb-Cholecalciferol (CALCIUM 500 + D3 PO) Take 1 Tab by mouth 2 Times a Day.     • ascorbic acid (ASCORBIC ACID) 500 MG Tab Take 500 mg by mouth 2 Times a Day.       No current facility-administered medications for this visit.          Allergies as of 08/14/2019   • (No Known Allergies)        ROS: Denies any chest pain, Shortness of breath, Changes bowel or bladder, Lower extremity edema.    Physical Exam:        Assessment and Plan.   64 y.o. female     1. Essential hypertension  Adequately controlled.  Patient reported chronic cough, that happens without asthma attack, or symptoms of her bronchitis, probably side effects of ACE inhibitors, will stop enalapril and change it to losartan 50 mg daily.  Continue on amlodipine 2.5 mg, and hydrochlorothiazide 12.5 mg daily.    - CBC WITH DIFFERENTIAL; Future  - Comp Metabolic Panel; Future  - TSH; Future  -  Lipid Profile; Future   - losartan (COZAAR) 50 MG Tab; Take 1 Tab by mouth every day.  Dispense: 30 Tab; Refill: 3    2. Dyslipidemia  He has been tolerating the statin. Denies muscle pain LFTs has been normal  Continue on atorvastatin 10 mg daily.    - TSH; Future  - Lipid Profile; Future    3. Recurrent major depressive disorder, in partial remission (HCC)  Stable.  No suicidal ideation.  Continue on Prozac 10 mg daily.    4. Deviated septum  Will refer to ENT for an evaluation.    - REFERRAL TO ENT

## 2019-08-19 ENCOUNTER — PATIENT MESSAGE (OUTPATIENT)
Dept: MEDICAL GROUP | Facility: MEDICAL CENTER | Age: 64
End: 2019-08-19

## 2019-08-21 ENCOUNTER — PATIENT MESSAGE (OUTPATIENT)
Dept: MEDICAL GROUP | Facility: MEDICAL CENTER | Age: 64
End: 2019-08-21

## 2019-08-21 NOTE — TELEPHONE ENCOUNTER
From: Agatha Fritz  To: Mila Nicole M.D.  Sent: 8/21/2019 4:42 PM PDT  Subject: Prescription Question    Picked up the RX of losartan from [facility]. Will double the amlodipine dose to 5 mg/day, as instructed by PCP at last week's visit. Will monitor BP daily, and will send results to you after one month on losartan.  Agatha  ----- Message -----  From: Mila Nicole M.D.  Sent: 8/20/2019 6:24 PM PDT  To: Agahta Fritz  Subject: RE: Prescription Question  I change enalapril to losartan 50 mg daily. I sent the medication to the pharmacy  And he is currently on amlodipine 2.5 mg, and hydrochlorothiazide 12.5 mg daily.    ----- Message -----   From: Agatha Fritz   Sent: 8/19/2019 10:36 AM PDT   To: Mila Nicole M.D.  Subject: Prescription Question    I see in my [facility] online pharmacy listing that I have both 2.5 mg & 5 mg doses of amlodapine, but I am currently taking a 5 mg dose. You mentenioned at last week’s visit that you want me to double my amlodipine dose. Please verify what dose you want me on daily.   Also, you were planning to DC enalapril 20 mg 2xDay and replace it with losartan, but [facility] does not yet reflect this change in meds. I’m just double checking these and am continuing with enalapril until otherwise notified.  Thanks, Agatha FINK RN

## 2019-09-10 ENCOUNTER — HOSPITAL ENCOUNTER (OUTPATIENT)
Dept: LAB | Facility: MEDICAL CENTER | Age: 64
End: 2019-09-10
Attending: FAMILY MEDICINE
Payer: COMMERCIAL

## 2019-09-10 DIAGNOSIS — E78.5 DYSLIPIDEMIA: ICD-10-CM

## 2019-09-10 DIAGNOSIS — I10 ESSENTIAL HYPERTENSION: ICD-10-CM

## 2019-09-10 LAB
ALBUMIN SERPL BCP-MCNC: 4.4 G/DL (ref 3.2–4.9)
ALBUMIN/GLOB SERPL: 1.6 G/DL
ALP SERPL-CCNC: 95 U/L (ref 30–99)
ALT SERPL-CCNC: 22 U/L (ref 2–50)
ANION GAP SERPL CALC-SCNC: 8 MMOL/L (ref 0–11.9)
AST SERPL-CCNC: 28 U/L (ref 12–45)
BASOPHILS # BLD AUTO: 0.4 % (ref 0–1.8)
BASOPHILS # BLD: 0.02 K/UL (ref 0–0.12)
BILIRUB SERPL-MCNC: 0.6 MG/DL (ref 0.1–1.5)
BUN SERPL-MCNC: 18 MG/DL (ref 8–22)
CALCIUM SERPL-MCNC: 9.8 MG/DL (ref 8.5–10.5)
CHLORIDE SERPL-SCNC: 104 MMOL/L (ref 96–112)
CHOLEST SERPL-MCNC: 190 MG/DL (ref 100–199)
CO2 SERPL-SCNC: 25 MMOL/L (ref 20–33)
CREAT SERPL-MCNC: 0.83 MG/DL (ref 0.5–1.4)
EOSINOPHIL # BLD AUTO: 0.1 K/UL (ref 0–0.51)
EOSINOPHIL NFR BLD: 2.2 % (ref 0–6.9)
ERYTHROCYTE [DISTWIDTH] IN BLOOD BY AUTOMATED COUNT: 42.7 FL (ref 35.9–50)
FASTING STATUS PATIENT QL REPORTED: NORMAL
GLOBULIN SER CALC-MCNC: 2.7 G/DL (ref 1.9–3.5)
GLUCOSE SERPL-MCNC: 75 MG/DL (ref 65–99)
HCT VFR BLD AUTO: 47.4 % (ref 37–47)
HDLC SERPL-MCNC: 77 MG/DL
HGB BLD-MCNC: 16.1 G/DL (ref 12–16)
IMM GRANULOCYTES # BLD AUTO: 0.01 K/UL (ref 0–0.11)
IMM GRANULOCYTES NFR BLD AUTO: 0.2 % (ref 0–0.9)
LDLC SERPL CALC-MCNC: 94 MG/DL
LYMPHOCYTES # BLD AUTO: 1.68 K/UL (ref 1–4.8)
LYMPHOCYTES NFR BLD: 37.6 % (ref 22–41)
MCH RBC QN AUTO: 31.6 PG (ref 27–33)
MCHC RBC AUTO-ENTMCNC: 34 G/DL (ref 33.6–35)
MCV RBC AUTO: 92.9 FL (ref 81.4–97.8)
MONOCYTES # BLD AUTO: 0.49 K/UL (ref 0–0.85)
MONOCYTES NFR BLD AUTO: 11 % (ref 0–13.4)
NEUTROPHILS # BLD AUTO: 2.17 K/UL (ref 2–7.15)
NEUTROPHILS NFR BLD: 48.6 % (ref 44–72)
NRBC # BLD AUTO: 0 K/UL
NRBC BLD-RTO: 0 /100 WBC
PLATELET # BLD AUTO: 277 K/UL (ref 164–446)
PMV BLD AUTO: 9.9 FL (ref 9–12.9)
POTASSIUM SERPL-SCNC: 3.7 MMOL/L (ref 3.6–5.5)
PROT SERPL-MCNC: 7.1 G/DL (ref 6–8.2)
RBC # BLD AUTO: 5.1 M/UL (ref 4.2–5.4)
SODIUM SERPL-SCNC: 137 MMOL/L (ref 135–145)
TRIGL SERPL-MCNC: 97 MG/DL (ref 0–149)
TSH SERPL DL<=0.005 MIU/L-ACNC: 1.21 UIU/ML (ref 0.38–5.33)
WBC # BLD AUTO: 4.5 K/UL (ref 4.8–10.8)

## 2019-09-10 PROCEDURE — 36415 COLL VENOUS BLD VENIPUNCTURE: CPT

## 2019-09-10 PROCEDURE — 80061 LIPID PANEL: CPT

## 2019-09-10 PROCEDURE — 84443 ASSAY THYROID STIM HORMONE: CPT

## 2019-09-10 PROCEDURE — 85025 COMPLETE CBC W/AUTO DIFF WBC: CPT

## 2019-09-10 PROCEDURE — 80053 COMPREHEN METABOLIC PANEL: CPT

## 2019-09-23 ENCOUNTER — PATIENT MESSAGE (OUTPATIENT)
Dept: MEDICAL GROUP | Facility: MEDICAL CENTER | Age: 64
End: 2019-09-23

## 2019-09-23 DIAGNOSIS — I10 ESSENTIAL HYPERTENSION: ICD-10-CM

## 2019-09-23 RX ORDER — LOSARTAN POTASSIUM 50 MG/1
50 TABLET ORAL DAILY
Qty: 90 TAB | Refills: 3 | Status: SHIPPED | OUTPATIENT
Start: 2019-09-23 | End: 2019-09-24

## 2019-09-23 NOTE — TELEPHONE ENCOUNTER
From: Agahta Fritz  To: Mila Nicole M.D.  Sent: 9/23/2019 9:21 AM PDT  Subject: Prescription Question    I was switched from ACE to ARB: One-month trial showed attached BPs.  I request an RX of the new Losartan 50 mg daily be sent to [facility], 90-day supply, please.  Thank you, Agatha Fritz RN

## 2019-09-24 ENCOUNTER — PATIENT MESSAGE (OUTPATIENT)
Dept: MEDICAL GROUP | Facility: MEDICAL CENTER | Age: 64
End: 2019-09-24

## 2019-09-24 DIAGNOSIS — I10 ESSENTIAL HYPERTENSION: ICD-10-CM

## 2019-09-24 RX ORDER — TELMISARTAN 40 MG/1
40 TABLET ORAL DAILY
Qty: 90 TAB | Refills: 3 | Status: SHIPPED
Start: 2019-09-24 | End: 2020-01-17 | Stop reason: SDUPTHER

## 2019-09-24 NOTE — TELEPHONE ENCOUNTER
From: Agatha Fritz  To: Mila Nicole M.D.  Sent: 9/24/2019 8:19 AM PDT  Subject: Prescription Question    New RX required (90-day supply) to [facility], to continue current regimen.  Also, observed on National news last night that Losartan is being recalled due to newly discovered cancer-causing ingredient. Please look into this further.    Thank you  ----- Message -----  From: Mila Nicole M.D.  Sent: 9/23/2019 4:56 PM PDT  To: Agatha Fritz  Subject: RE: Prescription Question  Good blood pressure reading. Continue current regimen.    ----- Message -----   From: Agatha Fritz   Sent: 9/23/2019 9:21 AM PDT   To: Mila Nicole M.D.  Subject: Prescription Question    I was switched from ACE to ARB: One-month trial showed attached BPs.  I request an RX of the new Losartan 50 mg daily be sent to [facility], 90-day supply, please.  Thank you, Agatha Fritz RN

## 2019-09-25 ENCOUNTER — PATIENT MESSAGE (OUTPATIENT)
Dept: MEDICAL GROUP | Facility: MEDICAL CENTER | Age: 64
End: 2019-09-25

## 2019-09-26 ENCOUNTER — PATIENT MESSAGE (OUTPATIENT)
Dept: MEDICAL GROUP | Facility: MEDICAL CENTER | Age: 64
End: 2019-09-26

## 2019-09-26 NOTE — TELEPHONE ENCOUNTER
From: Agatha Fritz  To: Mila Nicole M.D.  Sent: 9/26/2019 8:40 AM PDT  Subject: Prescription Question    I called [facility], and they stated that they were not affected by the Losartan recall. Their number is [phone] for calling in RXs.  ----- Message -----  From: Mila Nicole M.D.  Sent: 9/26/2019 7:56 AM PDT  To: Agatha Fritz  Subject: RE: Prescription Question  Please call them and find out.      ----- Message -----   From: Agatha Fritz   Sent: 9/25/2019 7:40 PM PDT   To: Mila Nicole M.D.  Subject: Prescription Question    [facility] relates their current lot of Losartan is not on recall. Not sure what [facility] status is.   ----- Message -----  From: Mila Nicole M.D.  Sent: 9/24/2019 4:54 PM PDT  To: Agatha Fritz  Subject: RE: Prescription Question  So I seen telmisartan 40 mg daily is a pharmacy instead.    ----- Message -----   From: Agatha Fritz   Sent: 9/24/2019 8:19 AM PDT   To: Mila Nicole M.D.  Subject: Prescription Question    New RX required (90-day supply) to [facility], to continue current regimen.  Also, observed on National news last night that Losartan is being recalled due to newly discovered cancer-causing ingredient. Please look into this further.    Thank you  ----- Message -----  From: Mila Nicole M.D.  Sent: 9/23/2019 4:56 PM PDT  To: Agatha Fritz  Subject: RE: Prescription Question  Good blood pressure reading. Continue current regimen.    ----- Message -----   From: Agatha Fritz   Sent: 9/23/2019 9:21 AM PDT   To: Mila Nicole M.D.  Subject: Prescription Question    I was switched from ACE to ARB: One-month trial showed attached BPs.  I request an RX of the new Losartan 50 mg daily be sent to [facility], 90-day supply, please.  Thank you, Agatha Fritz RN

## 2019-09-26 NOTE — TELEPHONE ENCOUNTER
From: Agatha Fritz  To: Mila Nicole M.D.  Sent: 9/25/2019 7:40 PM PDT  Subject: Prescription Question    [facility] relates their current lot of Losartan is not on recall. [facility]   ----- Message -----  From: Mila Nicole M.D.  Sent: 9/24/2019 4:54 PM PDT  To: Agatha Fritz  Subject: RE: Prescription Question  So I seen telmisartan 40 mg daily is a pharmacy instead.    ----- Message -----   From: Agatha Fritz   Sent: 9/24/2019 8:19 AM PDT   To: Mila Nicole M.D.  Subject: Prescription Question    New RX required (90-day supply) to [facility], to continue current regimen.  Also, observed on National news last night that Losartan is being recalled due to newly discovered cancer-causing ingredient. Please look into this further.    Thank you  ----- Message -----  From: Mila Nicole M.D.  Sent: 9/23/2019 4:56 PM PDT  To: Agatha Fritz  Subject: RE: Prescription Question  Good blood pressure reading. Continue current regimen.    ----- Message -----   From: Agatha Fritz   Sent: 9/23/2019 9:21 AM PDT   To: Mila Nicole M.D.  Subject: Prescription Question    I was switched from ACE to ARB: One-month trial showed attached BPs.  I request an RX of the new Losartan 50 mg daily be sent to [facility], 90-day supply, please.  Thank you, Agatha Fritz RN

## 2019-10-27 ENCOUNTER — PATIENT MESSAGE (OUTPATIENT)
Dept: MEDICAL GROUP | Facility: MEDICAL CENTER | Age: 64
End: 2019-10-27

## 2019-12-11 DIAGNOSIS — Z01.810 PRE-OPERATIVE CARDIOVASCULAR EXAMINATION: ICD-10-CM

## 2019-12-11 DIAGNOSIS — Z01.812 PRE-OPERATIVE LABORATORY EXAMINATION: ICD-10-CM

## 2019-12-11 LAB
ANION GAP SERPL CALC-SCNC: 13 MMOL/L (ref 0–11.9)
BUN SERPL-MCNC: 11 MG/DL (ref 8–22)
CALCIUM SERPL-MCNC: 9.5 MG/DL (ref 8.5–10.5)
CHLORIDE SERPL-SCNC: 105 MMOL/L (ref 96–112)
CO2 SERPL-SCNC: 24 MMOL/L (ref 20–33)
CREAT SERPL-MCNC: 0.88 MG/DL (ref 0.5–1.4)
GLUCOSE SERPL-MCNC: 101 MG/DL (ref 65–99)
POTASSIUM SERPL-SCNC: 4.1 MMOL/L (ref 3.6–5.5)
SODIUM SERPL-SCNC: 142 MMOL/L (ref 135–145)

## 2019-12-11 PROCEDURE — 93005 ELECTROCARDIOGRAM TRACING: CPT

## 2019-12-11 PROCEDURE — 36415 COLL VENOUS BLD VENIPUNCTURE: CPT

## 2019-12-11 PROCEDURE — 93010 ELECTROCARDIOGRAM REPORT: CPT | Performed by: INTERNAL MEDICINE

## 2019-12-11 PROCEDURE — 80048 BASIC METABOLIC PNL TOTAL CA: CPT

## 2019-12-11 RX ORDER — DIPHENHYDRAMINE HCL 25 MG
25 TABLET ORAL PRN
Status: ON HOLD | COMMUNITY
End: 2019-12-18

## 2019-12-11 RX ORDER — AMPICILLIN TRIHYDRATE 500 MG
1 CAPSULE ORAL 2 TIMES DAILY
COMMUNITY
End: 2024-02-22

## 2019-12-12 LAB — EKG IMPRESSION: NORMAL

## 2019-12-18 ENCOUNTER — ANESTHESIA (OUTPATIENT)
Dept: SURGERY | Facility: MEDICAL CENTER | Age: 64
End: 2019-12-18
Payer: COMMERCIAL

## 2019-12-18 ENCOUNTER — HOSPITAL ENCOUNTER (OUTPATIENT)
Facility: MEDICAL CENTER | Age: 64
End: 2019-12-18
Attending: OTOLARYNGOLOGY | Admitting: OTOLARYNGOLOGY
Payer: COMMERCIAL

## 2019-12-18 ENCOUNTER — ANESTHESIA EVENT (OUTPATIENT)
Dept: SURGERY | Facility: MEDICAL CENTER | Age: 64
End: 2019-12-18
Payer: COMMERCIAL

## 2019-12-18 VITALS
RESPIRATION RATE: 16 BRPM | WEIGHT: 158.73 LBS | TEMPERATURE: 97 F | HEIGHT: 64 IN | BODY MASS INDEX: 27.1 KG/M2 | HEART RATE: 76 BPM | SYSTOLIC BLOOD PRESSURE: 161 MMHG | OXYGEN SATURATION: 95 % | DIASTOLIC BLOOD PRESSURE: 84 MMHG

## 2019-12-18 DIAGNOSIS — G89.18 POST-OP PAIN: ICD-10-CM

## 2019-12-18 PROCEDURE — 700101 HCHG RX REV CODE 250: Performed by: OTOLARYNGOLOGY

## 2019-12-18 PROCEDURE — 160047 HCHG PACU  - EA ADDL 30 MINS PHASE II: Performed by: OTOLARYNGOLOGY

## 2019-12-18 PROCEDURE — 160035 HCHG PACU - 1ST 60 MINS PHASE I: Performed by: OTOLARYNGOLOGY

## 2019-12-18 PROCEDURE — 700101 HCHG RX REV CODE 250: Performed by: ANESTHESIOLOGY

## 2019-12-18 PROCEDURE — 500331 HCHG COTTONOID, SURG PATTIE: Performed by: OTOLARYNGOLOGY

## 2019-12-18 PROCEDURE — 160002 HCHG RECOVERY MINUTES (STAT): Performed by: OTOLARYNGOLOGY

## 2019-12-18 PROCEDURE — 160048 HCHG OR STATISTICAL LEVEL 1-5: Performed by: OTOLARYNGOLOGY

## 2019-12-18 PROCEDURE — C9046 COCAINE HCL NASAL SOLUTION: HCPCS | Performed by: OTOLARYNGOLOGY

## 2019-12-18 PROCEDURE — A9270 NON-COVERED ITEM OR SERVICE: HCPCS | Performed by: ANESTHESIOLOGY

## 2019-12-18 PROCEDURE — 502573 HCHG PACK, ENT: Performed by: OTOLARYNGOLOGY

## 2019-12-18 PROCEDURE — 501838 HCHG SUTURE GENERAL: Performed by: OTOLARYNGOLOGY

## 2019-12-18 PROCEDURE — 160029 HCHG SURGERY MINUTES - 1ST 30 MINS LEVEL 4: Performed by: OTOLARYNGOLOGY

## 2019-12-18 PROCEDURE — 160046 HCHG PACU - 1ST 60 MINS PHASE II: Performed by: OTOLARYNGOLOGY

## 2019-12-18 PROCEDURE — 160041 HCHG SURGERY MINUTES - EA ADDL 1 MIN LEVEL 4: Performed by: OTOLARYNGOLOGY

## 2019-12-18 PROCEDURE — 700102 HCHG RX REV CODE 250 W/ 637 OVERRIDE(OP): Performed by: OTOLARYNGOLOGY

## 2019-12-18 PROCEDURE — 160025 RECOVERY II MINUTES (STATS): Performed by: OTOLARYNGOLOGY

## 2019-12-18 PROCEDURE — 700105 HCHG RX REV CODE 258: Performed by: OTOLARYNGOLOGY

## 2019-12-18 PROCEDURE — A9270 NON-COVERED ITEM OR SERVICE: HCPCS | Performed by: OTOLARYNGOLOGY

## 2019-12-18 PROCEDURE — 501404 HCHG SPLINT, NASAL DOYLE AIRWAY: Performed by: OTOLARYNGOLOGY

## 2019-12-18 PROCEDURE — 700111 HCHG RX REV CODE 636 W/ 250 OVERRIDE (IP): Performed by: ANESTHESIOLOGY

## 2019-12-18 PROCEDURE — 700102 HCHG RX REV CODE 250 W/ 637 OVERRIDE(OP): Performed by: ANESTHESIOLOGY

## 2019-12-18 PROCEDURE — 160009 HCHG ANES TIME/MIN: Performed by: OTOLARYNGOLOGY

## 2019-12-18 RX ORDER — AMOXICILLIN 500 MG/1
500 CAPSULE ORAL 3 TIMES DAILY
Qty: 21 CAP | Refills: 0 | Status: SHIPPED | OUTPATIENT
Start: 2019-12-18 | End: 2021-11-12

## 2019-12-18 RX ORDER — ONDANSETRON 2 MG/ML
INJECTION INTRAMUSCULAR; INTRAVENOUS PRN
Status: DISCONTINUED | OUTPATIENT
Start: 2019-12-18 | End: 2019-12-18 | Stop reason: SURG

## 2019-12-18 RX ORDER — CELECOXIB 200 MG/1
400 CAPSULE ORAL ONCE
Status: COMPLETED | OUTPATIENT
Start: 2019-12-18 | End: 2019-12-18

## 2019-12-18 RX ORDER — HALOPERIDOL 5 MG/ML
1 INJECTION INTRAMUSCULAR
Status: DISCONTINUED | OUTPATIENT
Start: 2019-12-18 | End: 2019-12-18 | Stop reason: HOSPADM

## 2019-12-18 RX ORDER — BACITRACIN ZINC 500 [USP'U]/G
OINTMENT TOPICAL
Status: DISCONTINUED | OUTPATIENT
Start: 2019-12-18 | End: 2019-12-18 | Stop reason: HOSPADM

## 2019-12-18 RX ORDER — MIDAZOLAM HYDROCHLORIDE 1 MG/ML
INJECTION INTRAMUSCULAR; INTRAVENOUS PRN
Status: DISCONTINUED | OUTPATIENT
Start: 2019-12-18 | End: 2019-12-18 | Stop reason: SURG

## 2019-12-18 RX ORDER — LIDOCAINE HYDROCHLORIDE 40 MG/ML
SOLUTION TOPICAL PRN
Status: DISCONTINUED | OUTPATIENT
Start: 2019-12-18 | End: 2019-12-18 | Stop reason: SURG

## 2019-12-18 RX ORDER — SODIUM CHLORIDE, SODIUM LACTATE, POTASSIUM CHLORIDE, CALCIUM CHLORIDE 600; 310; 30; 20 MG/100ML; MG/100ML; MG/100ML; MG/100ML
INJECTION, SOLUTION INTRAVENOUS CONTINUOUS
Status: DISCONTINUED | OUTPATIENT
Start: 2019-12-18 | End: 2019-12-18 | Stop reason: HOSPADM

## 2019-12-18 RX ORDER — BACITRACIN ZINC 500 [USP'U]/G
OINTMENT TOPICAL
Status: DISCONTINUED
Start: 2019-12-18 | End: 2019-12-18 | Stop reason: HOSPADM

## 2019-12-18 RX ORDER — LIDOCAINE HYDROCHLORIDE AND EPINEPHRINE 10; 10 MG/ML; UG/ML
INJECTION, SOLUTION INFILTRATION; PERINEURAL
Status: DISCONTINUED | OUTPATIENT
Start: 2019-12-18 | End: 2019-12-18 | Stop reason: HOSPADM

## 2019-12-18 RX ORDER — DEXAMETHASONE SODIUM PHOSPHATE 4 MG/ML
INJECTION, SOLUTION INTRA-ARTICULAR; INTRALESIONAL; INTRAMUSCULAR; INTRAVENOUS; SOFT TISSUE PRN
Status: DISCONTINUED | OUTPATIENT
Start: 2019-12-18 | End: 2019-12-18 | Stop reason: SURG

## 2019-12-18 RX ORDER — ACETAMINOPHEN 500 MG
1000 TABLET ORAL ONCE
Status: COMPLETED | OUTPATIENT
Start: 2019-12-18 | End: 2019-12-18

## 2019-12-18 RX ORDER — ONDANSETRON 2 MG/ML
4 INJECTION INTRAMUSCULAR; INTRAVENOUS
Status: DISCONTINUED | OUTPATIENT
Start: 2019-12-18 | End: 2019-12-18 | Stop reason: HOSPADM

## 2019-12-18 RX ORDER — LIDOCAINE HYDROCHLORIDE AND EPINEPHRINE 10; 10 MG/ML; UG/ML
INJECTION, SOLUTION INFILTRATION; PERINEURAL
Status: DISCONTINUED
Start: 2019-12-18 | End: 2019-12-18 | Stop reason: HOSPADM

## 2019-12-18 RX ORDER — OXYCODONE HCL 5 MG/5 ML
10 SOLUTION, ORAL ORAL
Status: COMPLETED | OUTPATIENT
Start: 2019-12-18 | End: 2019-12-18

## 2019-12-18 RX ORDER — OXYCODONE HCL 5 MG/5 ML
5 SOLUTION, ORAL ORAL
Status: COMPLETED | OUTPATIENT
Start: 2019-12-18 | End: 2019-12-18

## 2019-12-18 RX ORDER — LIDOCAINE HYDROCHLORIDE 20 MG/ML
INJECTION, SOLUTION EPIDURAL; INFILTRATION; INTRACAUDAL; PERINEURAL PRN
Status: DISCONTINUED | OUTPATIENT
Start: 2019-12-18 | End: 2019-12-18 | Stop reason: SURG

## 2019-12-18 RX ORDER — OXYMETAZOLINE HYDROCHLORIDE 0.05 G/100ML
SPRAY NASAL
Status: DISCONTINUED
Start: 2019-12-18 | End: 2019-12-18 | Stop reason: HOSPADM

## 2019-12-18 RX ORDER — ROCURONIUM BROMIDE 10 MG/ML
INJECTION, SOLUTION INTRAVENOUS PRN
Status: DISCONTINUED | OUTPATIENT
Start: 2019-12-18 | End: 2019-12-18 | Stop reason: SURG

## 2019-12-18 RX ORDER — MEPERIDINE HYDROCHLORIDE 25 MG/ML
12.5 INJECTION INTRAMUSCULAR; INTRAVENOUS; SUBCUTANEOUS
Status: DISCONTINUED | OUTPATIENT
Start: 2019-12-18 | End: 2019-12-18 | Stop reason: HOSPADM

## 2019-12-18 RX ORDER — HYDROCODONE BITARTRATE AND ACETAMINOPHEN 5; 325 MG/1; MG/1
1-2 TABLET ORAL EVERY 4 HOURS PRN
Qty: 56 TAB | Refills: 0 | Status: SHIPPED | OUTPATIENT
Start: 2019-12-18 | End: 2019-12-25

## 2019-12-18 RX ORDER — GABAPENTIN 300 MG/1
300 CAPSULE ORAL ONCE
Status: COMPLETED | OUTPATIENT
Start: 2019-12-18 | End: 2019-12-18

## 2019-12-18 RX ORDER — ONDANSETRON 4 MG/1
4 TABLET, ORALLY DISINTEGRATING ORAL EVERY 6 HOURS PRN
Qty: 10 TAB | Refills: 1 | Status: SHIPPED | OUTPATIENT
Start: 2019-12-18 | End: 2021-11-12

## 2019-12-18 RX ADMIN — CELECOXIB 400 MG: 200 CAPSULE ORAL at 11:44

## 2019-12-18 RX ADMIN — MIDAZOLAM 2 MG: 1 INJECTION INTRAMUSCULAR; INTRAVENOUS at 13:22

## 2019-12-18 RX ADMIN — FENTANYL CITRATE 50 MCG: 50 INJECTION, SOLUTION INTRAMUSCULAR; INTRAVENOUS at 13:33

## 2019-12-18 RX ADMIN — FENTANYL CITRATE 100 MCG: 50 INJECTION, SOLUTION INTRAMUSCULAR; INTRAVENOUS at 13:25

## 2019-12-18 RX ADMIN — LIDOCAINE HYDROCHLORIDE 4 ML: 20 INJECTION, SOLUTION EPIDURAL; INFILTRATION; INTRACAUDAL at 13:25

## 2019-12-18 RX ADMIN — ACETAMINOPHEN 1000 MG: 500 TABLET ORAL at 11:45

## 2019-12-18 RX ADMIN — OXYCODONE HYDROCHLORIDE 5 MG: 5 SOLUTION ORAL at 14:58

## 2019-12-18 RX ADMIN — GABAPENTIN 300 MG: 300 CAPSULE ORAL at 11:45

## 2019-12-18 RX ADMIN — SODIUM CHLORIDE, POTASSIUM CHLORIDE, SODIUM LACTATE AND CALCIUM CHLORIDE: 600; 310; 30; 20 INJECTION, SOLUTION INTRAVENOUS at 11:42

## 2019-12-18 RX ADMIN — ONDANSETRON 4 MG: 2 INJECTION INTRAMUSCULAR; INTRAVENOUS at 13:58

## 2019-12-18 RX ADMIN — LIDOCAINE HYDROCHLORIDE 4 ML: 40 SOLUTION TOPICAL at 13:29

## 2019-12-18 RX ADMIN — SUGAMMADEX 200 MG: 100 INJECTION, SOLUTION INTRAVENOUS at 14:00

## 2019-12-18 RX ADMIN — PROPOFOL 160 MG: 10 INJECTION, EMULSION INTRAVENOUS at 13:25

## 2019-12-18 RX ADMIN — ROCURONIUM BROMIDE 20 MG: 10 INJECTION, SOLUTION INTRAVENOUS at 13:25

## 2019-12-18 RX ADMIN — PROPOFOL 40 MG: 10 INJECTION, EMULSION INTRAVENOUS at 14:07

## 2019-12-18 RX ADMIN — DEXAMETHASONE SODIUM PHOSPHATE 8 MG: 4 INJECTION, SOLUTION INTRA-ARTICULAR; INTRALESIONAL; INTRAMUSCULAR; INTRAVENOUS; SOFT TISSUE at 13:25

## 2019-12-18 ASSESSMENT — PAIN SCALES - GENERAL: PAIN_LEVEL: 0

## 2019-12-18 NOTE — DISCHARGE INSTRUCTIONS
ACTIVITY: Rest and take it easy for the first 24 hours.  A responsible adult is recommended to remain with you during that time.  It is normal to feel sleepy.  We encourage you to not do anything that requires balance, judgment or coordination.    MILD FLU-LIKE SYMPTOMS ARE NORMAL. YOU MAY EXPERIENCE GENERALIZED MUSCLE ACHES, THROAT IRRITATION, HEADACHE AND/OR SOME NAUSEA.    FOR 24 HOURS DO NOT:  Drive, operate machinery or run household appliances.  Drink beer or alcoholic beverages.   Make important decisions or sign legal documents.    SPECIAL INSTRUCTIONS: See attached    DIET: To avoid nausea, slowly advance diet as tolerated, avoiding spicy or greasy foods for the first day.  Add more substantial food to your diet according to your physician's instructions.  Babies can be fed formula or breast milk as soon as they are hungry.  INCREASE FLUIDS AND FIBER TO AVOID CONSTIPATION.    SURGICAL DRESSING/BATHING: You may shower after 24 hours, try to keep nasal passages dry. Pat dry.     FOLLOW-UP APPOINTMENT:  A follow-up appointment is arranged with your doctor on Decemer 26th at 8:00am    You should CALL YOUR PHYSICIAN if you develop:  Fever greater than 101 degrees F.  Pain not relieved by medication, or persistent nausea or vomiting.  Excessive bleeding (blood soaking through dressing) or unexpected drainage from the wound.  Extreme redness or swelling around the incision site, drainage of pus or foul smelling drainage.  Inability to urinate or empty your bladder within 8 hours.  Problems with breathing or chest pain.    You should call 911 if you develop problems with breathing or chest pain.  If you are unable to contact your doctor or surgical center, you should go to the nearest emergency room or urgent care center.  Physician's telephone #: [phone]    If any questions arise, call your doctor.  If your doctor is not available, please feel free to call the Surgical Center at [phone].  The Center  is open Monday through Friday from 7AM to 7PM.  You can also call the HEALTH HOTLINE open 24 hours/day, 7 days/week and speak to a nurse at [phone], or toll free at [phone].    A registered nurse may call you a few days after your surgery to see how you are doing after your procedure.    MEDICATIONS: Resume taking daily medication.  Take prescribed pain medication with food.  If no medication is prescribed, you may take non-aspirin pain medication if needed.  PAIN MEDICATION CAN BE VERY CONSTIPATING.  Take a stool softener or laxative such as senokot, pericolace, or milk of magnesia if needed.    Prescription given for Norco 5mg.  Last pain medication given at 3:00pm, you may take more medication at 7:00pm     If your physician has prescribed pain medication that includes Acetaminophen (Tylenol), do not take additional Acetaminophen (Tylenol) while taking the prescribed medication.    Depression / Suicide Risk    As you are discharged from this [facility] facility, it is important to learn how to keep safe from harming yourself.    Recognize the warning signs:  · Abrupt changes in personality, positive or negative- including increase in energy   · Giving away possessions  · Change in eating patterns- significant weight changes-  positive or negative  · Change in sleeping patterns- unable to sleep or sleeping all the time   · Unwillingness or inability to communicate  · Depression  · Unusual sadness, discouragement and loneliness  · Talk of wanting to die  · Neglect of personal appearance   · Rebelliousness- reckless behavior  · Withdrawal from people/activities they love  · Confusion- inability to concentrate     If you or a loved one observes any of these behaviors or has concerns about self-harm, here's what you can do:  · Talk about it- your feelings and reasons for harming yourself  · Remove any means that you might use to hurt yourself (examples: pills, rope, extension cords, firearm)  · Get  professional help from the community (Mental Health, Substance Abuse, psychological counseling)  · Do not be alone:Call your Safe Contact- someone whom you trust who will be there for you.  · Call your local CRISIS HOTLINE 730-3757 or 962-058-4216  · Call your local Children's Mobile Crisis Response Team [place] (572) 171-9029 or www.Banjo  · Call the toll free National Suicide Prevention Hotlines   · National Suicide Prevention Lifeline 244-954-OOKG (0389)  · National Hope Line Network 800-SUICIDE (215-3199)

## 2019-12-18 NOTE — OR NURSING
1415-Pt to PACU from OR. Report from anesthesia and OR RN. Pt on room air. Respirations even and unlabored. VSS.     1433- family at bedside    1440- pt meets criteria for Phase 2    1500- pt medicated for 4/10 pain     1526- pt has 1/10 pain, nasal sling in place, supplies given to spouse.     1551- Discharge orders received. Meets discharge criteria at this time. No pain. No nausea. tolerating PO. On RA. All lines and monitors discontinued. Reviewed discharge paperwork with spouse. Discussed diet, activity, medications, follow up care and worsening symptoms. No questions at this time. Pt to be discharged to home via private vehicle.

## 2019-12-18 NOTE — ANESTHESIA PREPROCEDURE EVALUATION
63 yo w/deviated septum and hypertrophy of the turbinates    Relevant Problems   ANESTHESIA (within normal limits)      PULMONARY   (+) Mild intermittent asthma without complication      CARDIAC   (+) Essential hypertension   (+) White coat hypertension      Other   (+) Dyslipidemia       Physical Exam    Airway   Mallampati: II  TM distance: >3 FB  Neck ROM: full       Cardiovascular   Rhythm: regular  Rate: normal  (-) murmur     Dental - normal exam  Comments: veneers       Pulmonary   Breath sounds clear to auscultation     Abdominal    Neurological - normal exam                 Anesthesia Plan    ASA 2       Plan - general       Airway plan will be ETT        Induction: intravenous    Postoperative Plan: Postoperative administration of opioids is intended.    Pertinent diagnostic labs and testing reviewed    Informed Consent:    Anesthetic plan and risks discussed with patient.

## 2019-12-18 NOTE — ANESTHESIA PROCEDURE NOTES
Airway  Date/Time: 12/18/2019 1:29 PM  Performed by: Bhumi Gaffney M.D.  Authorized by: Bhumi Gaffney M.D.     Location:  OR  Urgency:  Elective  Indications for Airway Management:  Anesthesia  Spontaneous Ventilation: absent    Sedation Level:  Deep  Preoxygenated: Yes    Patient Position:  Sniffing  Mask Difficulty Assessment:  1 - vent by mask  Final Airway Type:  Endotracheal airway  Final Endotracheal Airway:  ETT  Cuffed: Yes    Technique Used for Successful ETT Placement:  Direct laryngoscopy  Insertion Site:  Oral  Blade Type:  Reji  Laryngoscope Blade/Videolaryngoscope Blade Size:  3  ETT Size (mm):  7.0  Measured from:  Teeth  ETT to Teeth (cm):  22  Placement Verified by: capnometry and palpation of cuff    Cormack-Lehane Classification:  Grade IIa - partial view of glottis  Number of Attempts at Approach:  1

## 2019-12-18 NOTE — ANESTHESIA QCDR
2019 [facility] Clinical Data Registry (for Quality Improvement)     Postoperative nausea/vomiting risk protocol (Adult = 18 yrs and Pediatric 3-17 yrs)- (430 and 463)  General inhalation anesthetic (NOT TIVA) with PONV risk factors: Yes  Provision of anti-emetic therapy with at least 2 different classes of agents: Yes   Patient DID NOT receive anti-emetic therapy and reason is documented in Medical Record:  N/A    Multimodal Pain Management- (AQI59)  Patient undergoing Elective Surgery (i.e. Outpatient, or ASC, or Prescheduled Surgery prior to Hospital Admission): Yes  Use of Multimodal Pain Management, two or more drugs and/or interventions, NOT including systemic opioids: Yes   Exception: Documented allergy to multiple classes of analgesics:  N/A    PACU assessment of acute postoperative pain prior to Anesthesia Care End- Applies to Patients Age = 18- (ABG7)  Initial PACU pain score is which of the following: < 7/10  Patient unable to report pain score: N/A    Post-anesthetic transfer of care checklist/protocol to PACU/ICU- (426 and 427)  Upon conclusion of case, patient transferred to which of the following locations: PACU/Non-ICU  Use of transfer checklist/protocol: Yes  Exclusion: Service Performed in Patient Hospital Room (and thus did not require transfer): N/A    PACU Reintubation- (AQI31)  General anesthesia requiring endotracheal intubation (ETT) along with subsequent extubation in OR or PACU: Yes  Required reintubation in the PACU: No   Extubation was a planned trial documented in the medical record prior to removal of the original airway device:  N/A    Unplanned admission to ICU related to anesthesia service up through end of PACU care- (MD51)  Unplanned admission to ICU (not initially anticipated at anesthesia start time): No

## 2019-12-18 NOTE — ANESTHESIA TIME REPORT
Anesthesia Start and Stop Event Times     Date Time Event    12/18/2019 1306 Ready for Procedure     1320 Anesthesia Start     1416 Anesthesia Stop        Responsible Staff  12/18/19    Name Role Begin End    Bhumi Gaffney M.D. Anesth 1320 1416        Preop Diagnosis (Free Text):  Pre-op Diagnosis     DEVIATED SEPTUM, HYPERTROPHY        Preop Diagnosis (Codes):    Post op Diagnosis  Deviated septum  hypertrophied turbinates    Premium Reason  Non-Premium    Comments:

## 2019-12-18 NOTE — OP REPORT
DATE OF SERVICE:  12/18/2019    PREOPERATIVE DIAGNOSES:  Septal deviation, turbinate hypertrophy and maxime   bullosa.    POSTOPERATIVE DIAGNOSES:  Septal deviation, turbinate hypertrophy and maxime   bullosa.    PROCEDURES:  Septoplasty, turbinoplasty and right excision of maxime bullosa.    ATTENDING:  Harika Eldridge MD    ANESTHESIOLOGIST:  Bhumi Gaffney MD    COMPLICATIONS:  None.    PROCEDURE IN DETAIL:  The patient was appropriately identified and taken to   the operating room where she was lying in supine position.  General anesthesia   was induced and an endotracheal tube was placed.  The patient was positioned   in a reverse Trendelenburg position and prepped and draped in sterile fashion.    Inspection of the nose showed large turbinates bilaterally with the septum   extremely wide and the floor of the nose obstructing the inferior nose and   large turbinates.  At this time, 1% lidocaine was injected in anterior septum   bilaterally as well as posteriorly both inferior turbinates and the right   maxime bullosa.  Cocaine pledgets were placed, and then after allowing for   local time, pledgets were removed.  The patient was inspected.  Anterior   incision was made along the left anterior septum using a 15 blade and then   down to the perichondrium.  A subperichondrial flap was elevated using a freer   elevator.  The base of the cartilage was basically splayed over the nasal   spine.  This was elevated up and removed using a freer elevator and then   caudal elevator taking off the inferior portion of the nasal spine bilaterally   improving the nasal passages markedly.  Posteriorly, the septum was pushed   over to the left side using a long nasal speculum.  Inspection was done with a   0-degree telescope.  The maxime bullosa right side was incised using a sickle   knife and then removed using a Honey and the inferior turbinates were   shrunk up and opened with a turbinate blade and then suction  cautery was used   to stop any bleeding.  Reinspection at this time showed nice nasal passages   bilaterally.  Nasopore soaked in local was placed under both inferior   turbinates and then the middle meatus on the right.  The septum was quilted   back together using a 4-0 chromic.  The anterior incision was closed using   interrupted 4-0 chromic.  Voss splints were placed bilaterally and sutured in   place using a 3-0 nylon.  The patient was unprepped and draped, awakened,   extubated, and returned to recovery in stable and satisfactory condition.       ____________________________________     MD KEVIN Dimas / ÁNGEL    DD:  12/18/2019 14:24:18  DT:  12/18/2019 15:03:45    D#:  [id number]  Job#:  [id number]

## 2019-12-18 NOTE — ANESTHESIA POSTPROCEDURE EVALUATION
Patient: Agatha Fritz    Procedure Summary     Date:  12/18/19 Room / Location:  [facility] ROOM 22 / SURGERY SAME DAY [facility]    Anesthesia Start:  1320 Anesthesia Stop:  1416    Procedures:       SEPTOPLASTY, NOSE (N/A Nose)      TURBINOPLASTY- RIGHT MIRELA BULLOSA (Bilateral Nose) Diagnosis:  (DEVIATED SEPTUM, HYPERTROPHY)    Surgeon:  Harika Eldridge M.D. Responsible Provider:  Bhumi Gaffney M.D.    Anesthesia Type:  general ASA Status:  2          Final Anesthesia Type: general  Last vitals  BP   NIBP: 139/80    Temp   36.1 °C (97 °F)    Pulse   Pulse: 97   Resp   16    SpO2   94 %      Anesthesia Post Evaluation    Patient location during evaluation: PACU  Patient participation: complete - patient participated  Level of consciousness: awake  Pain score: 0    Airway patency: patent  Anesthetic complications: no  Cardiovascular status: adequate  Respiratory status: acceptable  Hydration status: acceptable    PONV: none           Nurse Pain Score: 0 (NPRS)

## 2020-01-17 ENCOUNTER — PATIENT MESSAGE (OUTPATIENT)
Dept: MEDICAL GROUP | Facility: MEDICAL CENTER | Age: 65
End: 2020-01-17

## 2020-01-17 DIAGNOSIS — I10 ESSENTIAL HYPERTENSION: ICD-10-CM

## 2020-01-17 RX ORDER — TELMISARTAN 40 MG/1
40 TABLET ORAL DAILY
Qty: 90 TAB | Refills: 3 | Status: SHIPPED
Start: 2020-01-17 | End: 2020-12-21

## 2020-01-17 RX ORDER — AMLODIPINE BESYLATE 5 MG/1
5 TABLET ORAL DAILY
Qty: 90 TAB | Refills: 3 | Status: CANCELLED
Start: 2020-01-17

## 2020-01-17 RX ORDER — AMLODIPINE BESYLATE 5 MG/1
5 TABLET ORAL DAILY
Qty: 90 TAB | Refills: 3 | Status: SHIPPED
Start: 2020-01-17 | End: 2021-04-29 | Stop reason: SDUPTHER

## 2020-01-17 NOTE — TELEPHONE ENCOUNTER
From: Agatha Fritz  To: Mila Nicole M.D.  Sent: 1/17/2020 9:24 AM PST  Subject: Prescription Question    Sorry, here is specific information on my new mail-order pharmacy:  [facility]  Fax: [phone]  Call-in: [phone]    I talked with them about refill of amlodapine (Ca-channel blocker)and losartan ([facility]  If you wish to change the ARB to telmarsatan, please contact one of the above numbers.      Thank you,   Agatha Fritz RN

## 2020-02-19 ENCOUNTER — OFFICE VISIT (OUTPATIENT)
Dept: MEDICAL GROUP | Facility: MEDICAL CENTER | Age: 65
End: 2020-02-19
Payer: COMMERCIAL

## 2020-02-19 VITALS
TEMPERATURE: 97.2 F | BODY MASS INDEX: 27.14 KG/M2 | HEART RATE: 68 BPM | SYSTOLIC BLOOD PRESSURE: 120 MMHG | HEIGHT: 64 IN | WEIGHT: 158.95 LBS | RESPIRATION RATE: 16 BRPM | OXYGEN SATURATION: 96 % | DIASTOLIC BLOOD PRESSURE: 72 MMHG

## 2020-02-19 DIAGNOSIS — F33.41 RECURRENT MAJOR DEPRESSIVE DISORDER, IN PARTIAL REMISSION (HCC): ICD-10-CM

## 2020-02-19 DIAGNOSIS — I10 ESSENTIAL HYPERTENSION: ICD-10-CM

## 2020-02-19 DIAGNOSIS — E78.5 DYSLIPIDEMIA: ICD-10-CM

## 2020-02-19 PROCEDURE — 99214 OFFICE O/P EST MOD 30 MIN: CPT | Performed by: FAMILY MEDICINE

## 2020-02-19 RX ORDER — HYDROCHLOROTHIAZIDE 12.5 MG/1
12.5 TABLET ORAL DAILY
Qty: 90 TAB | Refills: 3 | Status: SHIPPED | OUTPATIENT
Start: 2020-02-19 | End: 2020-03-01 | Stop reason: SDUPTHER

## 2020-02-19 RX ORDER — HYDROCHLOROTHIAZIDE 12.5 MG/1
12.5 TABLET ORAL DAILY
Qty: 90 TAB | Refills: 3 | Status: SHIPPED
Start: 2020-02-19 | End: 2020-02-19

## 2020-02-19 ASSESSMENT — PATIENT HEALTH QUESTIONNAIRE - PHQ9
SUM OF ALL RESPONSES TO PHQ9 QUESTIONS 1 AND 2: 0
1. LITTLE INTEREST OR PLEASURE IN DOING THINGS: NOT AT ALL
3. TROUBLE FALLING OR STAYING ASLEEP OR SLEEPING TOO MUCH: NOT AT ALL
7. TROUBLE CONCENTRATING ON THINGS, SUCH AS READING THE NEWSPAPER OR WATCHING TELEVISION: NOT AT ALL
6. FEELING BAD ABOUT YOURSELF - OR THAT YOU ARE A FAILURE OR HAVE LET YOURSELF OR YOUR FAMILY DOWN: NOT AL ALL
SUM OF ALL RESPONSES TO PHQ QUESTIONS 1-9: 0
9. THOUGHTS THAT YOU WOULD BE BETTER OFF DEAD, OR OF HURTING YOURSELF: NOT AT ALL
2. FEELING DOWN, DEPRESSED, IRRITABLE, OR HOPELESS: NOT AT ALL
5. POOR APPETITE OR OVEREATING: NOT AT ALL
4. FEELING TIRED OR HAVING LITTLE ENERGY: NOT AT ALL
8. MOVING OR SPEAKING SO SLOWLY THAT OTHER PEOPLE COULD HAVE NOTICED. OR THE OPPOSITE, BEING SO FIGETY OR RESTLESS THAT YOU HAVE BEEN MOVING AROUND A LOT MORE THAN USUAL: NOT AT ALL

## 2020-02-20 NOTE — PROGRESS NOTES
CC: Hypertension, hyperlipidemia, depression    HPI:   Agatha presents today discuss the following medical issues    Essential hypertension  Has been adequately controlled on current medication. Denies headache, chest pain, and SOB.patient has been on Telmisartan 40 mg daily, amlodipine 5 mg daily, and hydrochlorothiazide 12.5 mg daily.  No side effects    Dyslipidemia  He has been tolerating the statin. Denies muscle pain LFTs has been normal, she is currently on atorvastatin 10 mg daily.    Recurrent major depressive disorder, in partial remission (HCC)  Her mood has been fluctuating but mostly stable.  Denies any suicidal ideation.  She is currently on fluoxetine 10 mg daily and has been working, no side effects            Patient Active Problem List    Diagnosis Date Noted   • Post-op pain 12/18/2019   • Deviated septum 08/14/2019   • Recurrent major depressive disorder, in partial remission (HCC) 01/30/2019   • Mild intermittent asthma without complication 01/30/2019   • Carpal tunnel syndrome on right 11/12/2016   • Cervicalgia 09/14/2016   • White coat hypertension 04/04/2016   • Essential hypertension 04/04/2016   • Dyslipidemia 04/04/2016       Current Outpatient Medications   Medication Sig Dispense Refill   • hydroCHLOROthiazide (HYDRODIURIL) 12.5 MG tablet Take 1 Tab by mouth every day. 90 Tab 3   • telmisartan (MICARDIS) 40 MG Tab Take 1 Tab by mouth every day. 90 Tab 3   • amLODIPine (NORVASC) 5 MG Tab Take 1 Tab by mouth every day. 90 Tab 3   • amoxicillin (AMOXIL) 500 MG Cap Take 1 Cap by mouth 3 times a day. 21 Cap 0   • ondansetron (ZOFRAN ODT) 4 MG TABLET DISPERSIBLE Take 1 Tab by mouth every 6 hours as needed. 10 Tab 1   • Cranberry 450 MG Cap Take 1 Cap by mouth 2 Times a Day.     • diphenhydrAMINE-APAP, sleep,  MG Tab Take 1-2 Tabs by mouth at bedtime as needed.     • Calcium Carb-Cholecalciferol (CALCIUM 600 + D PO) Take 1 Tab by mouth 2 Times a Day.     • atorvastatin (LIPITOR) 10 MG Tab  "Take 1 Tab by mouth every day. 90 Tab 3   • albuterol 108 (90 Base) MCG/ACT Aero Soln inhalation aerosol Inhale 2 Puffs by mouth every 6 hours as needed for Shortness of Breath. 8.5 g 3   • FLUoxetine (PROZAC) 10 MG Cap Take 1 Cap by mouth every day. 90 Cap 3   • ascorbic acid (ASCORBIC ACID) 500 MG Tab Take 500 mg by mouth 2 Times a Day.       No current facility-administered medications for this visit.          Allergies as of 02/19/2020   • (No Known Allergies)        ROS: Denies any chest pain, Shortness of breath, Changes bowel or bladder, Lower extremity edema.    Physical Exam:  /72 (BP Location: Right arm, Patient Position: Sitting, BP Cuff Size: Adult)   Pulse 68   Temp 36.2 °C (97.2 °F) (Temporal)   Resp 16   Ht 1.626 m (5' 4\")   Wt 72.1 kg (158 lb 15.2 oz)   LMP  (LMP Unknown)   SpO2 96%   BMI 27.28 kg/m²   Gen.: Well-developed, well-nourished, no apparent distress,pleasant and cooperative with the examination  Skin:  Warm and dry with good turgor. No rashes or suspicious lesions in visible areas  Eye: PERRLA, conjunctiva and sclera clear, lids normal  HEENT:Sinuses nontender with palpation, TMs clear, nares patent with pink mucosa, and clear rhinorrhea,no septal deviation ,polyps or lesions. lips without lesions, oropharynx clear.  Neck: Trachea midline,no masses or adenopathy. No JVD.  Thyroid: normal consistency and size. No masses or nodules. Not tender with palpation.  Cor: Regular rate and rhythm without murmur, gallop or rub.  Lungs: Respirations unlabored.Clear to auscultation with equal breath sounds bilaterally. No wheezes, rhonchi.  Abdomen: Soft nontender without hepatosplenomegaly or masses appreciated, normoactive bowel sounds. No hernias.  Extremities: No cyanosis, clubbing or edema, Symmetrical without deformities or malformations. Pulses 2+ and symmetrical both upper and lower extremities  Psych: Alert and oriented x 3.Normal affect, judgement,insight and " memory.      Assessment and Plan.   64 y.o. female     1. Essential hypertension  Controlled.  Continue on candesartan 40 mg daily, amlodipine 5 mg daily, and hydrochlorothiazide 12.5 mg daily.    - hydroCHLOROthiazide (HYDRODIURIL) 12.5 MG tablet; Take 1 Tab by mouth every day.  Dispense: 90 Tab; Refill: 3    2. Dyslipidemia  He has been tolerating the statin. Denies muscle pain LFTs has been normal  Continue on atorvastatin 10 mg daily.    3. Recurrent major depressive disorder, in partial remission (HCC)  Stable.  Has been doing fine on fluoxetine 10 mg daily.

## 2020-03-01 DIAGNOSIS — I10 ESSENTIAL HYPERTENSION: ICD-10-CM

## 2020-03-02 RX ORDER — HYDROCHLOROTHIAZIDE 12.5 MG/1
12.5 TABLET ORAL DAILY
Qty: 90 TAB | Refills: 3 | Status: SHIPPED | OUTPATIENT
Start: 2020-03-02 | End: 2021-05-09 | Stop reason: SDUPTHER

## 2020-03-27 ENCOUNTER — PATIENT MESSAGE (OUTPATIENT)
Dept: MEDICAL GROUP | Facility: MEDICAL CENTER | Age: 65
End: 2020-03-27

## 2020-03-27 DIAGNOSIS — F51.04 CHRONIC INSOMNIA: ICD-10-CM

## 2020-03-27 DIAGNOSIS — F32.A DEPRESSION, UNSPECIFIED DEPRESSION TYPE: ICD-10-CM

## 2020-03-27 DIAGNOSIS — E78.5 DYSLIPIDEMIA: ICD-10-CM

## 2020-03-27 RX ORDER — FLUOXETINE 10 MG/1
10 CAPSULE ORAL DAILY
Qty: 90 CAP | Refills: 3 | Status: SHIPPED | OUTPATIENT
Start: 2020-03-27 | End: 2021-05-11

## 2020-03-27 RX ORDER — TRAZODONE HYDROCHLORIDE 50 MG/1
50 TABLET ORAL
Qty: 90 TAB | Refills: 3 | Status: SHIPPED | OUTPATIENT
Start: 2020-03-27 | End: 2023-02-07 | Stop reason: SDUPTHER

## 2020-03-27 RX ORDER — ATORVASTATIN CALCIUM 10 MG/1
10 TABLET, FILM COATED ORAL DAILY
Qty: 90 TAB | Refills: 3 | Status: SHIPPED | OUTPATIENT
Start: 2020-03-27 | End: 2021-03-29

## 2020-03-27 NOTE — TELEPHONE ENCOUNTER
From: Agatha Fritz  To: Mila Nicole M.D.  Sent: 3/27/2020 8:24 AM PDT  Subject: Prescription Question    Due to current pandemic lifestyle changes (difficulty sleeping, increased anxiety/depression, self isolation), I am requestng renewed prescriptions of the following to my mail-order pharmacy - [facility]:  (fluoxetine could not be renewed online, and trazodone was not added to my medication history list)    fluoxetine 10 mg, 1 tab daily, quantity 90  Trazodone 50 mg, 1 tab h.s.    Thank you very much,  I appreciate your care during these difficult times.  Agatha Fritz RN  (I am feeling well, otherwise)

## 2020-05-29 ENCOUNTER — PATIENT MESSAGE (OUTPATIENT)
Dept: MEDICAL GROUP | Facility: MEDICAL CENTER | Age: 65
End: 2020-05-29

## 2020-06-02 ENCOUNTER — OFFICE VISIT (OUTPATIENT)
Dept: MEDICAL GROUP | Facility: MEDICAL CENTER | Age: 65
End: 2020-06-02
Payer: COMMERCIAL

## 2020-06-02 VITALS
HEART RATE: 70 BPM | OXYGEN SATURATION: 96 % | RESPIRATION RATE: 16 BRPM | SYSTOLIC BLOOD PRESSURE: 116 MMHG | WEIGHT: 153.88 LBS | BODY MASS INDEX: 26.27 KG/M2 | HEIGHT: 64 IN | DIASTOLIC BLOOD PRESSURE: 74 MMHG | TEMPERATURE: 97.2 F

## 2020-06-02 DIAGNOSIS — I10 ESSENTIAL HYPERTENSION: ICD-10-CM

## 2020-06-02 DIAGNOSIS — Z01.419 ENCOUNTER FOR CERVICAL PAP SMEAR WITH PELVIC EXAM: ICD-10-CM

## 2020-06-02 DIAGNOSIS — Z02.1 PHYSICAL EXAM, PRE-EMPLOYMENT: ICD-10-CM

## 2020-06-02 DIAGNOSIS — Z12.39 BREAST CANCER SCREENING: ICD-10-CM

## 2020-06-02 DIAGNOSIS — J45.20 MILD INTERMITTENT ASTHMA WITHOUT COMPLICATION: ICD-10-CM

## 2020-06-02 DIAGNOSIS — F33.41 RECURRENT MAJOR DEPRESSIVE DISORDER, IN PARTIAL REMISSION (HCC): ICD-10-CM

## 2020-06-02 DIAGNOSIS — E78.5 DYSLIPIDEMIA: ICD-10-CM

## 2020-06-02 PROCEDURE — 99214 OFFICE O/P EST MOD 30 MIN: CPT | Performed by: FAMILY MEDICINE

## 2020-06-02 ASSESSMENT — FIBROSIS 4 INDEX: FIB4 SCORE: 1.38

## 2020-06-02 NOTE — PROGRESS NOTES
CC: Pre-employment physical exam, hypertension, hyperlipidemia, asthma, depression    HPI:   Agatha presents today discuss the following    Physical exam, pre-employment  Patient is here for a preemployment history and physical exam.  His blood pressure has been adequately controlled, and mood has been adequately controlled on current medications.  Physical exam was performed, no abnormality detected.    Essential hypertension  Has been adequately controlled on current medication. Denies headache, chest pain, and SOB.patient has been on Telmisartan 40 mg daily, amlodipine 5 mg daily, and hydrochlorothiazide 12.5 mg daily.  No side effects     Dyslipidemia  He has been tolerating the statin. Denies muscle pain LFTs has been normal, she is currently on atorvastatin 10 mg daily.    Mild intermittent asthma without complication  Patient has been asymptomatic, takes albuterol only as needed.  Last time he took it was    Recurrent major depressive disorder, in partial remission (HCC)  Her mood has been fluctuating but mostly stable.  Denies any suicidal ideation.  She is currently on fluoxetine 10 mg daily and has been working, no side effects    Patient is due for Pap smear and mammogram    Patient Active Problem List    Diagnosis Date Noted   • Post-op pain 12/18/2019   • Deviated septum 08/14/2019   • Recurrent major depressive disorder, in partial remission (HCC) 01/30/2019   • Mild intermittent asthma without complication 01/30/2019   • Carpal tunnel syndrome on right 11/12/2016   • Cervicalgia 09/14/2016   • White coat hypertension 04/04/2016   • Essential hypertension 04/04/2016   • Dyslipidemia 04/04/2016       Current Outpatient Medications   Medication Sig Dispense Refill   • atorvastatin (LIPITOR) 10 MG Tab Take 1 Tab by mouth every day. 90 Tab 3   • FLUoxetine (PROZAC) 10 MG Cap Take 1 Cap by mouth every day. 90 Cap 3   • traZODone (DESYREL) 50 MG Tab Take 1 Tab by mouth every bedtime. 90 Tab 3   •  "hydroCHLOROthiazide (HYDRODIURIL) 12.5 MG tablet Take 1 Tab by mouth every day. 90 Tab 3   • telmisartan (MICARDIS) 40 MG Tab Take 1 Tab by mouth every day. 90 Tab 3   • amLODIPine (NORVASC) 5 MG Tab Take 1 Tab by mouth every day. 90 Tab 3   • amoxicillin (AMOXIL) 500 MG Cap Take 1 Cap by mouth 3 times a day. 21 Cap 0   • ondansetron (ZOFRAN ODT) 4 MG TABLET DISPERSIBLE Take 1 Tab by mouth every 6 hours as needed. 10 Tab 1   • Cranberry 450 MG Cap Take 1 Cap by mouth 2 Times a Day.     • diphenhydrAMINE-APAP, sleep,  MG Tab Take 1-2 Tabs by mouth at bedtime as needed.     • Calcium Carb-Cholecalciferol (CALCIUM 600 + D PO) Take 1 Tab by mouth 2 Times a Day.     • albuterol 108 (90 Base) MCG/ACT Aero Soln inhalation aerosol Inhale 2 Puffs by mouth every 6 hours as needed for Shortness of Breath. 8.5 g 3   • ascorbic acid (ASCORBIC ACID) 500 MG Tab Take 500 mg by mouth 2 Times a Day.       No current facility-administered medications for this visit.          Allergies as of 06/02/2020   • (No Known Allergies)        ROS: Denies any chest pain, Shortness of breath, Changes bowel or bladder, Lower extremity edema.    Physical Exam:  /74 (BP Location: Right arm, Patient Position: Sitting, BP Cuff Size: Adult)   Pulse 70   Temp 36.2 °C (97.2 °F) (Temporal)   Resp 16   Ht 1.626 m (5' 4\")   Wt 69.8 kg (153 lb 14.1 oz)   LMP  (LMP Unknown)   SpO2 96%   BMI 26.41 kg/m²   Gen.: Well-developed, well-nourished, no apparent distress,pleasant and cooperative with the examination  Skin:  Warm and dry with good turgor. No rashes or suspicious lesions in visible areas  Eye: PERRLA, conjunctiva and sclera clear, lids normal  HEENT:Sinuses nontender with palpation, TMs clear, nares patent with pink mucosa, and clear rhinorrhea,no septal deviation ,polyps or lesions. lips without lesions, oropharynx clear.  Neck: Trachea midline,no masses or adenopathy. No JVD.  Thyroid: normal consistency and size. No masses or " nodules. Not tender with palpation.  Cor: Regular rate and rhythm without murmur, gallop or rub.  Lungs: Respirations unlabored.Clear to auscultation with equal breath sounds bilaterally. No wheezes, rhonchi.  Abdomen: Soft nontender without hepatosplenomegaly or masses appreciated, normoactive bowel sounds. No hernias.  Extremities: No cyanosis, clubbing or edema, Symmetrical without deformities or malformations. Pulses 2+ and symmetrical both upper and lower extremities  Lymphatic: No abnormal adenopathy of the neck, upper chest, groin or axillae.  Psych: Alert and oriented x 3.Normal affect, judgement,insight and memory.      Assessment and Plan.   65 y.o. female     1. Essential hypertension  Controlled.  Continue on Telmisartan 40 mg daily, amlodipine 5 mg daily, and hydrochlorothiazide 12.5 mg daily.     2. Dyslipidemia  He has been tolerating the statin. Denies muscle pain LFTs has been normal  Continue on atorvastatin 10 mg    3. Mild intermittent asthma without complication  Stable.  Asymptomatic.  Continue on albuterol as needed    4. Recurrent major depressive disorder, in partial remission (HCC)  Mood has been fluctuating, but mostly stable.  No suicidal ideation  Continue on fluoxetine 10 mg daily    5. Breast cancer screening    - MA-SCREENING MAMMO BILAT W/CAD; Future    6. Encounter for cervical Pap smear with pelvic exam  Patient preferred to do the Pap smear with a gynecologist.    - REFERRAL TO GYNECOLOGY    7. Physical exam, pre-employment  Physical exam was performed, no abnormality detected.

## 2020-07-16 ENCOUNTER — HOSPITAL ENCOUNTER (OUTPATIENT)
Facility: MEDICAL CENTER | Age: 65
End: 2020-07-16
Attending: OBSTETRICS & GYNECOLOGY
Payer: COMMERCIAL

## 2020-07-16 ENCOUNTER — GYNECOLOGY VISIT (OUTPATIENT)
Dept: OBGYN | Facility: CLINIC | Age: 65
End: 2020-07-16
Payer: COMMERCIAL

## 2020-07-16 VITALS — BODY MASS INDEX: 27.81 KG/M2 | DIASTOLIC BLOOD PRESSURE: 76 MMHG | WEIGHT: 162 LBS | SYSTOLIC BLOOD PRESSURE: 137 MMHG

## 2020-07-16 DIAGNOSIS — Z00.00 ANNUAL PHYSICAL EXAM: ICD-10-CM

## 2020-07-16 DIAGNOSIS — N94.10 DYSPAREUNIA, FEMALE: ICD-10-CM

## 2020-07-16 DIAGNOSIS — N94.10 FEMALE DYSPAREUNIA: ICD-10-CM

## 2020-07-16 PROCEDURE — 88175 CYTOPATH C/V AUTO FLUID REDO: CPT

## 2020-07-16 PROCEDURE — 87624 HPV HI-RISK TYP POOLED RSLT: CPT

## 2020-07-16 PROCEDURE — 99386 PREV VISIT NEW AGE 40-64: CPT | Performed by: OBSTETRICS & GYNECOLOGY

## 2020-07-16 RX ORDER — CONJUGATED ESTROGENS 0.62 MG/G
CREAM VAGINAL
Qty: 30 G | Refills: 1 | Status: SHIPPED | OUTPATIENT
Start: 2020-07-16 | End: 2023-02-07

## 2020-07-16 ASSESSMENT — FIBROSIS 4 INDEX: FIB4 SCORE: 1.38

## 2020-07-16 NOTE — NON-PROVIDER
Pt here for new pt appt  Pt states  Pharmacy verified  Good #:[phone]  PAP: 2017 normal  MAMMO: 6/20

## 2020-07-16 NOTE — PROGRESS NOTES
Chief complaint: Annual exam    Agatha Fritz is a 65 y.o.  female who presents for her Annual Gynecologic Exam      HPI Comments: Pt presents for her annual well woman exam.   Patient reports pain with intercourse.  Was on hormone replacement therapy up until approximately .  She does report significant pain with penetration.  Minimal relief with over-the-counter lubricants.    No LMP recorded (lmp unknown). Patient is postmenopausal.    Mammogram up-to-date    Review of Systems:   Pertinent positives documented in HPI and all other systems reviewed & are negative    PGYN Hx: As above.  Patient reports no history of any abnormal Pap smears in the past    All PMH, PSH, allergies, social history and FH reviewed and updated today:  Past Medical History:   Diagnosis Date   • Anesthesia     nitrous oxide in dental work causes panic attacks   • Arthritis     osteo, right toe and knees.   • Asthma    • Bronchitis    • Cataract -    left, early stages   • Depression    • Dyslipidemia 2016   • Environmental and seasonal allergies    • High cholesterol    • Hypertension    • Muscle disorder    • Psychiatric problem 2016    depression   • RBBB (right bundle branch block)    • Snoring     no sleep study     Past Surgical History:   Procedure Laterality Date   • PB REPAIR OF NASAL SEPTUM N/A 2019    Procedure: SEPTOPLASTY, NOSE;  Surgeon: Harika Eldridge M.D.;  Location: SURGERY SAME DAY [facility];  Service: Ent   • TURBINOPLASTY Bilateral 2019    Procedure: TURBINOPLASTY- RIGHT MIRELA BULLOSA;  Surgeon: Harika Eldridge M.D.;  Location: SURGERY SAME DAY [facility];  Service: Ent   • COLONOSCOPY  2018   • CARPAL TUNNEL RELEASE Left 2017    Procedure: CARPAL TUNNEL RELEASE;  Surgeon: Woody Schaeffer M.D.;  Location: SURGERY [facility];  Service: Neurosurgery   • CARPAL TUNNEL RELEASE Right 2016    Procedure: CARPAL TUNNEL RELEASE;  Surgeon: Woody HOPKINS  ANNIE Schaeffer;  Location: SURGERY [facility];  Service:    • PB INJ CERV/THORAC,W/WO CNTRST  9/14/2016    Procedure: INJ EPI NON NEUROLYTIC C/T - C6-7;  Surgeon: Don Padron M.D.;  Location: SURGERY [facility];  Service: Pain Management   • PB FLUORSCOPIC GUIDANCE SPINAL INJECTION  9/14/2016    Procedure: FLUOROGUIDE FOR SPINAL INJ;  Surgeon: Don Padron M.D.;  Location: SURGERY [email or web address];  Service: Pain Management   • BREAST BIOPSY  2002    R breast, needle biopsy-Negative   • LYMPH NODE EXCISION Left 2002    Left groin-negative   • ACL RECONSTRUCTION W/ ALLOGRAFT Left 1995    MD Keshav Steven.    • TONSILLECTOMY  as child     Medications:   Current Outpatient Medications Ordered in Epic   Medication Sig Dispense Refill   • estrogens, conjugated (PREMARIN) 0.625 MG/GM Cream 0.5 g vaginally at night for 2 weeks.  Then every other day 30 g 1   • atorvastatin (LIPITOR) 10 MG Tab Take 1 Tab by mouth every day. 90 Tab 3   • FLUoxetine (PROZAC) 10 MG Cap Take 1 Cap by mouth every day. 90 Cap 3   • traZODone (DESYREL) 50 MG Tab Take 1 Tab by mouth every bedtime. 90 Tab 3   • hydroCHLOROthiazide (HYDRODIURIL) 12.5 MG tablet Take 1 Tab by mouth every day. 90 Tab 3   • telmisartan (MICARDIS) 40 MG Tab Take 1 Tab by mouth every day. 90 Tab 3   • amLODIPine (NORVASC) 5 MG Tab Take 1 Tab by mouth every day. 90 Tab 3   • amoxicillin (AMOXIL) 500 MG Cap Take 1 Cap by mouth 3 times a day. 21 Cap 0   • Cranberry 450 MG Cap Take 1 Cap by mouth 2 Times a Day.     • diphenhydrAMINE-APAP, sleep,  MG Tab Take 1-2 Tabs by mouth at bedtime as needed.     • Calcium Carb-Cholecalciferol (CALCIUM 600 + D PO) Take 1 Tab by mouth 2 Times a Day.     • albuterol 108 (90 Base) MCG/ACT Aero Soln inhalation aerosol Inhale 2 Puffs by mouth every 6 hours as needed for Shortness of Breath. 8.5 g 3   • ascorbic acid (ASCORBIC ACID) 500 MG Tab Take 500 mg by mouth 2 Times a Day.     • ondansetron (ZOFRAN ODT) 4  MG TABLET DISPERSIBLE Take 1 Tab by mouth every 6 hours as needed. (Patient not taking: Reported on 2020) 10 Tab 1     No current Epic-ordered facility-administered medications on file.      Patient has no known allergies.  Social History     Socioeconomic History   • Marital status:      Spouse name: Not on file   • Number of children: Not on file   • Years of education: Not on file   • Highest education level: Not on file   Occupational History   • Occupation: registered nurse     Comment: [employer]   Social Needs   • Financial resource strain: Not on file   • Food insecurity     Worry: Not on file     Inability: Not on file   • Transportation needs     Medical: Not on file     Non-medical: Not on file   Tobacco Use   • Smoking status: Former Smoker     Packs/day: 1.00     Years: 15.00     Pack years: 15.00     Types: Cigarettes     Last attempt to quit: 1989     Years since quittin.5   • Smokeless tobacco: Never Used   Substance and Sexual Activity   • Alcohol use: Yes     Alcohol/week: 0.0 oz     Comment: reports 3/week   • Drug use: No   • Sexual activity: Not on file   Lifestyle   • Physical activity     Days per week: Not on file     Minutes per session: Not on file   • Stress: Not on file   Relationships   • Social connections     Talks on phone: Not on file     Gets together: Not on file     Attends Orthodox service: Not on file     Active member of club or organization: Not on file     Attends meetings of clubs or organizations: Not on file     Relationship status: Not on file   • Intimate partner violence     Fear of current or ex partner: Not on file     Emotionally abused: Not on file     Physically abused: Not on file     Forced sexual activity: Not on file   Other Topics Concern   • Not on file   Social History Narrative   • Not on file     Family History   Problem Relation Age of Onset   • Hypertension Son    • Hypertension Father    • Diabetes Maternal Grandmother            Objective:   Vital measurements:  /76   Wt 73.5 kg (162 lb)   Body mass index is 27.81 kg/m². (Goal BM I>18 <25)    Physical Exam   Nursing note and vitals reviewed.  Constitutional: She is oriented to person, place, and time. She appears well-developed and well-nourished. No distress.     HEENT:   Head: Normocephalic and atraumatic.   Right Ear: External ear normal.   Left Ear: External ear normal.   Nose: Nose normal.   Eyes: Conjunctivae and EOM are normal. Pupils are equal, round, and reactive to light. No scleral icterus.     Neck: Normal range of motion. Neck supple. No tracheal deviation present. No thyromegaly present.     Pulmonary/Chest: Effort normal and breath sounds normal. No respiratory distress.     Breast:  Symmetrical, normal consistency without masses., No dimpling or skin changes, Normal nipples without discharge, no axillary lymphadenopathy    Abdominal: Soft. Bowel sounds are normal. She exhibits no distension and no mass. No tenderness. She has no rebound and no guarding.     Genitourinary:  Pelvic exam was performed with patient supine.  External genitalia with no abnormal pigmentation, there is some labial fusion between the labia minora and majora, no rash, tenderness, lesion or injury to the labia bilaterally.  BUS normal  Vagina is pink and moist with no lesions, foul discharge, erythema, tenderness or bleeding. No foreign body around the vagina or signs of injury. Thin vaginal mucosa with loss of rugae noted, consistent with hypoestrogenism  Cervix exhibits no motion tenderness, no discharge and no friability, no lesions.   Uterus is 6 cm and not deviated, not enlarged, not fixed and not tender.  Right adnexa displays no mass, no tenderness and no fullness.  Left adnexa displays no mass, no tenderness and no fullness.     Musculoskeletal: Normal range of motion. Non tender. She exhibits no edema and no tenderness.     Lymphadenopathy: She has no cervical or supraclavicular  adenopathy.     Neurological: She is alert and oriented to person, place, and time. She exhibits normal muscle tone.     Skin: Skin is warm and dry. No rash noted. She is not diaphoretic. No erythema. No pallor.     Psychiatric: She has a normal mood and affect. Her behavior is normal. Judgment and thought content normal.        Assessment:     1. Annual physical exam     2. Dyspareunia, female  THINPREP PAP WITH HPV         Plan:   Pap and physical exam performed.  HPV testing also ordered.  Discussed with the patient given the fact that she has never had an abnormal Pap smear, has been with the same partner since age 17 and has no other risk factors, that this may be her last Pap smear.  Self breast awareness discussed.  Counseling: breast self exam and menopause  Encouraged exercise and proper diet.  Mammograms annually.   See medications and orders placed in encounter report.      Given dyspareunia, discussed with the patient use of topical Premarin cream.  The side effects were discussed with the patient.  Patient will begin with daily application for 2 weeks, followed by application every other day thereafter.  Follow-up in 2 weeks.

## 2020-08-19 ENCOUNTER — OFFICE VISIT (OUTPATIENT)
Dept: MEDICAL GROUP | Facility: MEDICAL CENTER | Age: 65
End: 2020-08-19
Payer: COMMERCIAL

## 2020-08-19 VITALS
RESPIRATION RATE: 16 BRPM | HEIGHT: 64 IN | OXYGEN SATURATION: 97 % | WEIGHT: 161 LBS | SYSTOLIC BLOOD PRESSURE: 110 MMHG | DIASTOLIC BLOOD PRESSURE: 60 MMHG | TEMPERATURE: 98.1 F | HEART RATE: 63 BPM | BODY MASS INDEX: 27.49 KG/M2

## 2020-08-19 DIAGNOSIS — I10 ESSENTIAL HYPERTENSION: ICD-10-CM

## 2020-08-19 DIAGNOSIS — Z11.59 NEED FOR HEPATITIS C SCREENING TEST: ICD-10-CM

## 2020-08-19 DIAGNOSIS — E78.5 DYSLIPIDEMIA: ICD-10-CM

## 2020-08-19 DIAGNOSIS — F33.41 RECURRENT MAJOR DEPRESSIVE DISORDER, IN PARTIAL REMISSION (HCC): ICD-10-CM

## 2020-08-19 PROCEDURE — 99214 OFFICE O/P EST MOD 30 MIN: CPT | Performed by: FAMILY MEDICINE

## 2020-08-19 ASSESSMENT — FIBROSIS 4 INDEX: FIB4 SCORE: 1.38

## 2020-08-19 NOTE — PROGRESS NOTES
CC: Hypertension, dyslipidemia, depression    HPI:   Agatha presents today to discuss the following medical issues:    Essential hypertension  Blood pressure has been adequately controlled.  Denies headache, chest pain, shortness of breath.  Patient is currently on hydrochlorothiazide 12.5 mg daily, amlodipine 5 mg daily, and Micardis 40 mg daily.  No side effects    Dyslipidemia  She has been tolerating the statin. Denies muscle pain LFTs has been normal, patient has been on atorvastatin 10 mg daily    Recurrent major depressive disorder, in partial remission (HCC)  Mood has been adequately controlled.  Denies any suicidal ideation.  Patient has been doing fine on Prozac 10 mg daily    Patient Active Problem List    Diagnosis Date Noted   • Female dyspareunia 07/16/2020   • Post-op pain 12/18/2019   • Deviated septum 08/14/2019   • Recurrent major depressive disorder, in partial remission (HCC) 01/30/2019   • Mild intermittent asthma without complication 01/30/2019   • Carpal tunnel syndrome on right 11/12/2016   • Cervicalgia 09/14/2016   • White coat hypertension 04/04/2016   • Essential hypertension 04/04/2016   • Dyslipidemia 04/04/2016       Current Outpatient Medications   Medication Sig Dispense Refill   • estrogens, conjugated (PREMARIN) 0.625 MG/GM Cream 0.5 g vaginally at night for 2 weeks.  Then every other day 30 g 1   • atorvastatin (LIPITOR) 10 MG Tab Take 1 Tab by mouth every day. 90 Tab 3   • FLUoxetine (PROZAC) 10 MG Cap Take 1 Cap by mouth every day. 90 Cap 3   • traZODone (DESYREL) 50 MG Tab Take 1 Tab by mouth every bedtime. 90 Tab 3   • hydroCHLOROthiazide (HYDRODIURIL) 12.5 MG tablet Take 1 Tab by mouth every day. 90 Tab 3   • telmisartan (MICARDIS) 40 MG Tab Take 1 Tab by mouth every day. 90 Tab 3   • amLODIPine (NORVASC) 5 MG Tab Take 1 Tab by mouth every day. 90 Tab 3   • amoxicillin (AMOXIL) 500 MG Cap Take 1 Cap by mouth 3 times a day. 21 Cap 0   • ondansetron (ZOFRAN ODT) 4 MG TABLET  "DISPERSIBLE Take 1 Tab by mouth every 6 hours as needed. (Patient not taking: Reported on 7/16/2020) 10 Tab 1   • Cranberry 450 MG Cap Take 1 Cap by mouth 2 Times a Day.     • diphenhydrAMINE-APAP, sleep,  MG Tab Take 1-2 Tabs by mouth at bedtime as needed.     • Calcium Carb-Cholecalciferol (CALCIUM 600 + D PO) Take 1 Tab by mouth 2 Times a Day.     • albuterol 108 (90 Base) MCG/ACT Aero Soln inhalation aerosol Inhale 2 Puffs by mouth every 6 hours as needed for Shortness of Breath. 8.5 g 3   • ascorbic acid (ASCORBIC ACID) 500 MG Tab Take 500 mg by mouth 2 Times a Day.       No current facility-administered medications for this visit.          Allergies as of 08/19/2020   • (No Known Allergies)        ROS: Denies any chest pain, Shortness of breath, Changes bowel or bladder, Lower extremity edema.    Physical Exam:  /60 (BP Location: Right arm, Patient Position: Sitting, BP Cuff Size: Adult)   Pulse 63   Temp 36.7 °C (98.1 °F) (Temporal)   Resp 16   Ht 1.626 m (5' 4\")   Wt 73 kg (161 lb)   LMP  (LMP Unknown)   SpO2 97%   BMI 27.64 kg/m²   Gen.: Well-developed, well-nourished, no apparent distress,pleasant and cooperative with the examination  Skin:  Warm and dry with good turgor. No rashes or suspicious lesions in visible areas  HEENT:Sinuses nontender with palpation, TMs clear, nares patent with pink mucosa and clear rhinorrhea,no septal deviation ,polyps or lesions. lips without lesions, oropharynx clear.  Neck: Trachea midline,no masses or adenopathy. No JVD.  Cor: Regular rate and rhythm without murmur, gallop or rub.  Lungs: Respirations unlabored.Clear to auscultation with equal breath sounds bilaterally. No wheezes, rhonchi.  Extremities: No cyanosis, clubbing or edema.        Assessment and Plan.   65 y.o. female     1. Essential hypertension  Controlled.  Continue on hydrochlorothiazide 12.5 mg daily, amlodipine 5 mg daily, and Micardis 40 mg daily.  - CBC WITH DIFFERENTIAL; Future  - " Comp Metabolic Panel; Future  - Lipid Profile; Future  - TSH; Future    2. Dyslipidemia  He has been tolerating the statin. Denies muscle pain LFTs has been normal  Continue atorvastatin 10 mg daily  - Lipid Profile; Future  - TSH; Future    3. Recurrent major depressive disorder, in partial remission (HCC)  Patient has been doing fine on Prozac 10 mg daily  - TSH; Future    4. Need for hepatitis C screening test    - HEP C VIRUS ANTIBODY; Future

## 2020-10-07 ENCOUNTER — PATIENT MESSAGE (OUTPATIENT)
Dept: MEDICAL GROUP | Facility: LAB | Age: 65
End: 2020-10-07

## 2020-10-07 ENCOUNTER — NURSE TRIAGE (OUTPATIENT)
Dept: HEALTH INFORMATION MANAGEMENT | Facility: OTHER | Age: 65
End: 2020-10-07

## 2020-10-07 DIAGNOSIS — Z20.822 CLOSE EXPOSURE TO COVID-19 VIRUS: ICD-10-CM

## 2020-10-07 NOTE — TELEPHONE ENCOUNTER
From: Agatha Fritz  To: Tomás Salazar D.O.  Sent: 10/7/2020 2:03 PM PDT  Subject: Non-Urgent Medical Question    Hi. I am currently waiting to hear back from the [facility]. I am requiring a Covid-19 test. I was informed by one of my employers ([employer]) yesterday Oct 6 (while I was out camping), that a patient I saw on October 3rd at 7 am has tested positive and is hospitalized, and I will need a Covid screening with results to be done before I can return to work. I know my full-time employer, [employer], will also want results before I can return to work. I am currently off for Fall Break. Schools resumes October 12th. Please call me as soon as possible (since reaching [facility] are impossible at present). I am currently asymptomatic.  Thank You,  Agatha Fritz, RN  [phone]

## 2020-10-08 ENCOUNTER — HOSPITAL ENCOUNTER (OUTPATIENT)
Dept: LAB | Facility: MEDICAL CENTER | Age: 65
End: 2020-10-08
Payer: COMMERCIAL

## 2020-10-08 LAB
COVID ORDER STATUS COVID19: NORMAL
SARS-COV-2 RNA RESP QL NAA+PROBE: NOTDETECTED
SPECIMEN SOURCE: NORMAL

## 2020-10-08 PROCEDURE — U0003 INFECTIOUS AGENT DETECTION BY NUCLEIC ACID (DNA OR RNA); SEVERE ACUTE RESPIRATORY SYNDROME CORONAVIRUS 2 (SARS-COV-2) (CORONAVIRUS DISEASE [COVID-19]), AMPLIFIED PROBE TECHNIQUE, MAKING USE OF HIGH THROUGHPUT TECHNOLOGIES AS DESCRIBED BY CMS-2020-01-R: HCPCS

## 2020-12-20 DIAGNOSIS — I10 ESSENTIAL HYPERTENSION: ICD-10-CM

## 2020-12-21 RX ORDER — TELMISARTAN 40 MG/1
TABLET ORAL
Qty: 90 TAB | Refills: 3 | Status: SHIPPED | OUTPATIENT
Start: 2020-12-21 | End: 2022-02-22

## 2021-02-05 ENCOUNTER — OFFICE VISIT (OUTPATIENT)
Dept: URGENT CARE | Facility: CLINIC | Age: 66
End: 2021-02-05
Payer: COMMERCIAL

## 2021-02-05 VITALS
SYSTOLIC BLOOD PRESSURE: 112 MMHG | RESPIRATION RATE: 16 BRPM | HEIGHT: 64 IN | TEMPERATURE: 99 F | HEART RATE: 72 BPM | OXYGEN SATURATION: 95 % | BODY MASS INDEX: 27.31 KG/M2 | WEIGHT: 160 LBS | DIASTOLIC BLOOD PRESSURE: 64 MMHG

## 2021-02-05 DIAGNOSIS — J02.0 PHARYNGITIS DUE TO STREPTOCOCCUS SPECIES: ICD-10-CM

## 2021-02-05 LAB
INT CON NEG: NEGATIVE
INT CON POS: POSITIVE
S PYO AG THROAT QL: POSITIVE

## 2021-02-05 PROCEDURE — 99213 OFFICE O/P EST LOW 20 MIN: CPT | Performed by: PHYSICIAN ASSISTANT

## 2021-02-05 PROCEDURE — 87880 STREP A ASSAY W/OPTIC: CPT | Performed by: PHYSICIAN ASSISTANT

## 2021-02-05 RX ORDER — AMOXICILLIN 500 MG/1
500 CAPSULE ORAL 2 TIMES DAILY
Qty: 20 CAP | Refills: 0 | Status: SHIPPED | OUTPATIENT
Start: 2021-02-05 | End: 2021-02-15

## 2021-02-05 ASSESSMENT — ENCOUNTER SYMPTOMS
VOMITING: 0
WHEEZING: 0
HEADACHES: 1
COUGH: 1
DIARRHEA: 0
SORE THROAT: 1
FEVER: 1
SINUS PAIN: 0
NAUSEA: 0
CHILLS: 1
SHORTNESS OF BREATH: 0
ABDOMINAL PAIN: 0
MYALGIAS: 0
SPUTUM PRODUCTION: 0

## 2021-02-05 ASSESSMENT — FIBROSIS 4 INDEX: FIB4 SCORE: 1.38

## 2021-02-05 NOTE — PROGRESS NOTES
Subjective:   Agatha Frtiz is a 65 y.o. female who presents for Pharyngitis (started this morning with a small headache and coworker has strep)      HPI  65 y.o. female presents to urgent care with new problem to provider of sore throat onset yesterday and worse on waking this morning.  Patient reports associated subjective fevers, chills, mild cough, and headaches.  She reports confirmed exposure to strep pharyngitis from a coworker recently.  Patient denies known exposure to COVID-19 or sick contacts in her home. Denies other associated aggravating or alleviating factors.     Review of Systems   Constitutional: Positive for chills and fever. Negative for malaise/fatigue.   HENT: Positive for sore throat. Negative for congestion, ear pain and sinus pain.    Respiratory: Positive for cough. Negative for sputum production, shortness of breath and wheezing.    Cardiovascular: Negative for chest pain.   Gastrointestinal: Negative for abdominal pain, diarrhea, nausea and vomiting.   Musculoskeletal: Negative for myalgias.   Skin: Negative for rash.   Neurological: Positive for headaches.   All other systems reviewed and are negative.      Patient Active Problem List   Diagnosis   • White coat hypertension   • Essential hypertension   • Dyslipidemia   • Cervicalgia   • Carpal tunnel syndrome on right   • Recurrent major depressive disorder, in partial remission (HCC)   • Mild intermittent asthma without complication   • Deviated septum   • Post-op pain   • Female dyspareunia     Past Surgical History:   Procedure Laterality Date   • PB REPAIR OF NASAL SEPTUM N/A 12/18/2019    Procedure: SEPTOPLASTY, NOSE;  Surgeon: Harika Eldridge M.D.;  Location: SURGERY SAME DAY [facility];  Service: Ent   • TURBINOPLASTY Bilateral 12/18/2019    Procedure: TURBINOPLASTY- RIGHT MIRELA BULLOSA;  Surgeon: Harika Eldridge M.D.;  Location: SURGERY SAME DAY [facility];  Service: Ent   • COLONOSCOPY  09/2018   • CARPAL  "TUNNEL RELEASE Left 2017    Procedure: CARPAL TUNNEL RELEASE;  Surgeon: Woody Schaeffer M.D.;  Location: SURGERY [facility];  Service: Neurosurgery   • CARPAL TUNNEL RELEASE Right 2016    Procedure: CARPAL TUNNEL RELEASE;  Surgeon: Woody Schaeffer M.D.;  Location: SURGERY [facility];  Service:    • PB INJ CERV/THORAC,W/WO CNTRST  2016    Procedure: INJ EPI NON NEUROLYTIC C/T - C6-7;  Surgeon: Don Padron M.D.;  Location: SURGERY [facility];  Service: Pain Management   • PB FLUORSCOPIC GUIDANCE SPINAL INJECTION  2016    Procedure: FLUOROGUIDE FOR SPINAL INJ;  Surgeon: Don Padron M.D.;  Location: SURGERY [facility];  Service: Pain Management   • BREAST BIOPSY      R breast, needle biopsy-Negative   • LYMPH NODE EXCISION Left     Left groin-negative   • ACL RECONSTRUCTION W/ ALLOGRAFT Left     [facility]    • TONSILLECTOMY  as child     Social History     Tobacco Use   • Smoking status: Former Smoker     Packs/day: 1.00     Years: 15.00     Pack years: 15.00     Types: Cigarettes     Quit date: 1989     Years since quittin.1   • Smokeless tobacco: Never Used   Substance Use Topics   • Alcohol use: Yes     Alcohol/week: 0.0 oz     Comment: reports 3/week   • Drug use: No      Family History   Problem Relation Age of Onset   • Hypertension Son    • Hypertension Father    • Diabetes Maternal Grandmother       (Allergies, Medications, & Tobacco/Substance Use were reconciled by the Medical Assistant and reviewed by myself. The family history is prepopulated)     Objective:     /64 (BP Location: Left arm, Patient Position: Sitting, BP Cuff Size: Adult long)   Pulse 72   Temp 37.2 °C (99 °F) (Temporal)   Resp 16   Ht 1.626 m (5' 4\")   Wt 72.6 kg (160 lb)   LMP  (LMP Unknown)   SpO2 95%   BMI 27.46 kg/m²     Physical Exam  Vitals signs reviewed.   Constitutional:       General: She is not in acute distress.     Appearance: " Normal appearance. She is well-developed. She is not ill-appearing.   HENT:      Head: Normocephalic and atraumatic.      Nose: Nose normal.      Mouth/Throat:      Pharynx: Uvula midline. Posterior oropharyngeal erythema present.      Tonsils: No tonsillar abscesses. 2+ on the right. 2+ on the left.      Comments: Minimal tonsillary exudate  Eyes:      Conjunctiva/sclera: Conjunctivae normal.   Neck:      Musculoskeletal: Normal range of motion and neck supple.   Cardiovascular:      Rate and Rhythm: Normal rate and regular rhythm.      Heart sounds: Normal heart sounds.   Pulmonary:      Effort: Pulmonary effort is normal. No respiratory distress.      Breath sounds: Normal breath sounds.   Abdominal:      Palpations: Abdomen is soft.   Lymphadenopathy:      Cervical: Cervical adenopathy present.   Skin:     General: Skin is warm and dry.      Findings: No erythema or rash.   Neurological:      General: No focal deficit present.      Mental Status: She is alert and oriented to person, place, and time.   Psychiatric:         Mood and Affect: Mood normal.         Behavior: Behavior normal.         Thought Content: Thought content normal.         Judgment: Judgment normal.         Assessment/Plan:     1. Pharyngitis due to Streptococcus species  POCT Rapid Strep A    amoxicillin (AMOXIL) 500 MG Cap     Results for orders placed or performed in visit on 02/05/21   POCT Rapid Strep A   Result Value Ref Range    Rapid Strep Screen Positive     Internal Control Positive Positive     Internal Control Negative Negative      Take full course of antibiotics as directed. OTC tylenol/NSAIDs for fever and pain. Drink plenty of fluids and rest. Recommend 24 to 48 hours of antibiotics prior to returning to work.     Differential diagnosis, natural history, supportive care, and indications for immediate follow-up discussed.    Advised the patient to follow-up with the primary care physician for recheck, reevaluation, and  consideration of further management.  Patient verbalized understanding of treatment plan and has no further questions regarding care.     Please note that this dictation was created using voice recognition software. I have made a reasonable attempt to correct obvious errors, but I expect that there are errors of grammar and possibly content that I did not discover before finalizing the note.    This note was electronically signed by Chel Watts PA-C

## 2021-02-23 ENCOUNTER — HOSPITAL ENCOUNTER (OUTPATIENT)
Dept: LAB | Facility: MEDICAL CENTER | Age: 66
End: 2021-02-23
Attending: FAMILY MEDICINE
Payer: COMMERCIAL

## 2021-02-23 DIAGNOSIS — F33.41 RECURRENT MAJOR DEPRESSIVE DISORDER, IN PARTIAL REMISSION (HCC): ICD-10-CM

## 2021-02-23 DIAGNOSIS — Z11.59 NEED FOR HEPATITIS C SCREENING TEST: ICD-10-CM

## 2021-02-23 DIAGNOSIS — E78.5 DYSLIPIDEMIA: ICD-10-CM

## 2021-02-23 DIAGNOSIS — I10 ESSENTIAL HYPERTENSION: ICD-10-CM

## 2021-02-23 LAB
ALBUMIN SERPL BCP-MCNC: 4.3 G/DL (ref 3.2–4.9)
ALBUMIN/GLOB SERPL: 1.7 G/DL
ALP SERPL-CCNC: 92 U/L (ref 30–99)
ALT SERPL-CCNC: 23 U/L (ref 2–50)
ANION GAP SERPL CALC-SCNC: 7 MMOL/L (ref 7–16)
AST SERPL-CCNC: 30 U/L (ref 12–45)
BASOPHILS # BLD AUTO: 0.5 % (ref 0–1.8)
BASOPHILS # BLD: 0.02 K/UL (ref 0–0.12)
BILIRUB SERPL-MCNC: 0.6 MG/DL (ref 0.1–1.5)
BUN SERPL-MCNC: 12 MG/DL (ref 8–22)
CALCIUM SERPL-MCNC: 9.7 MG/DL (ref 8.5–10.5)
CHLORIDE SERPL-SCNC: 100 MMOL/L (ref 96–112)
CHOLEST SERPL-MCNC: 171 MG/DL (ref 100–199)
CO2 SERPL-SCNC: 24 MMOL/L (ref 20–33)
CREAT SERPL-MCNC: 0.6 MG/DL (ref 0.5–1.4)
EOSINOPHIL # BLD AUTO: 0.07 K/UL (ref 0–0.51)
EOSINOPHIL NFR BLD: 1.6 % (ref 0–6.9)
ERYTHROCYTE [DISTWIDTH] IN BLOOD BY AUTOMATED COUNT: 42.4 FL (ref 35.9–50)
FASTING STATUS PATIENT QL REPORTED: NORMAL
GLOBULIN SER CALC-MCNC: 2.6 G/DL (ref 1.9–3.5)
GLUCOSE SERPL-MCNC: 92 MG/DL (ref 65–99)
HCT VFR BLD AUTO: 46.4 % (ref 37–47)
HCV AB SER QL: NORMAL
HDLC SERPL-MCNC: 64 MG/DL
HGB BLD-MCNC: 15.6 G/DL (ref 12–16)
IMM GRANULOCYTES # BLD AUTO: 0.02 K/UL (ref 0–0.11)
IMM GRANULOCYTES NFR BLD AUTO: 0.5 % (ref 0–0.9)
LDLC SERPL CALC-MCNC: 77 MG/DL
LYMPHOCYTES # BLD AUTO: 1.64 K/UL (ref 1–4.8)
LYMPHOCYTES NFR BLD: 37.4 % (ref 22–41)
MCH RBC QN AUTO: 31.4 PG (ref 27–33)
MCHC RBC AUTO-ENTMCNC: 33.6 G/DL (ref 33.6–35)
MCV RBC AUTO: 93.4 FL (ref 81.4–97.8)
MONOCYTES # BLD AUTO: 0.5 K/UL (ref 0–0.85)
MONOCYTES NFR BLD AUTO: 11.4 % (ref 0–13.4)
NEUTROPHILS # BLD AUTO: 2.13 K/UL (ref 2–7.15)
NEUTROPHILS NFR BLD: 48.6 % (ref 44–72)
NRBC # BLD AUTO: 0 K/UL
NRBC BLD-RTO: 0 /100 WBC
PLATELET # BLD AUTO: 270 K/UL (ref 164–446)
PMV BLD AUTO: 10.5 FL (ref 9–12.9)
POTASSIUM SERPL-SCNC: 3.7 MMOL/L (ref 3.6–5.5)
PROT SERPL-MCNC: 6.9 G/DL (ref 6–8.2)
RBC # BLD AUTO: 4.97 M/UL (ref 4.2–5.4)
SODIUM SERPL-SCNC: 131 MMOL/L (ref 135–145)
TRIGL SERPL-MCNC: 148 MG/DL (ref 0–149)
TSH SERPL DL<=0.005 MIU/L-ACNC: 0.94 UIU/ML (ref 0.38–5.33)
WBC # BLD AUTO: 4.4 K/UL (ref 4.8–10.8)

## 2021-02-23 PROCEDURE — 84443 ASSAY THYROID STIM HORMONE: CPT

## 2021-02-23 PROCEDURE — 85025 COMPLETE CBC W/AUTO DIFF WBC: CPT

## 2021-02-23 PROCEDURE — 80053 COMPREHEN METABOLIC PANEL: CPT

## 2021-02-23 PROCEDURE — 86803 HEPATITIS C AB TEST: CPT

## 2021-02-23 PROCEDURE — 36415 COLL VENOUS BLD VENIPUNCTURE: CPT

## 2021-02-23 PROCEDURE — 80061 LIPID PANEL: CPT

## 2021-03-16 DIAGNOSIS — Z20.822 CLOSE EXPOSURE TO COVID-19 VIRUS: ICD-10-CM

## 2021-03-29 DIAGNOSIS — E78.5 DYSLIPIDEMIA: ICD-10-CM

## 2021-03-29 RX ORDER — ATORVASTATIN CALCIUM 10 MG/1
10 TABLET, FILM COATED ORAL DAILY
Qty: 90 TABLET | Refills: 0 | Status: SHIPPED | OUTPATIENT
Start: 2021-03-29 | End: 2021-06-17

## 2021-03-29 NOTE — TELEPHONE ENCOUNTER
Was the patient seen in the last year in this department? Yes     Does patient have an active prescription for medications requested? No     Received Request Via: Pharmacy

## 2021-04-29 ENCOUNTER — TELEMEDICINE (OUTPATIENT)
Dept: MEDICAL GROUP | Facility: MEDICAL CENTER | Age: 66
End: 2021-04-29
Payer: COMMERCIAL

## 2021-04-29 VITALS
HEIGHT: 64 IN | DIASTOLIC BLOOD PRESSURE: 89 MMHG | OXYGEN SATURATION: 97 % | BODY MASS INDEX: 28.17 KG/M2 | WEIGHT: 165 LBS | HEART RATE: 70 BPM | TEMPERATURE: 98.2 F | SYSTOLIC BLOOD PRESSURE: 147 MMHG

## 2021-04-29 DIAGNOSIS — I10 ESSENTIAL HYPERTENSION: ICD-10-CM

## 2021-04-29 DIAGNOSIS — E78.5 DYSLIPIDEMIA: ICD-10-CM

## 2021-04-29 DIAGNOSIS — F33.41 RECURRENT MAJOR DEPRESSIVE DISORDER, IN PARTIAL REMISSION (HCC): ICD-10-CM

## 2021-04-29 DIAGNOSIS — J45.20 MILD INTERMITTENT ASTHMA WITHOUT COMPLICATION: ICD-10-CM

## 2021-04-29 PROCEDURE — 99214 OFFICE O/P EST MOD 30 MIN: CPT | Mod: 95,CR | Performed by: FAMILY MEDICINE

## 2021-04-29 RX ORDER — AMLODIPINE BESYLATE 5 MG/1
5 TABLET ORAL DAILY
Qty: 90 TABLET | Refills: 3 | Status: SHIPPED | OUTPATIENT
Start: 2021-04-29 | End: 2022-04-18 | Stop reason: SDUPTHER

## 2021-04-29 ASSESSMENT — FIBROSIS 4 INDEX: FIB4 SCORE: 1.48

## 2021-04-29 NOTE — PROGRESS NOTES
Virtual Visit: Established Patient   This visit was conducted via Zoom  using secure and encrypted videoconferencing technology. The patient was in a private location in the state of [place].    The patient's identity was confirmed and verbal consent was obtained for this virtual visit.      CC: Hypertension, asthma, dyslipidemia, depression                                                                                                                                      HPI:   Agatha presents today with the following.    Essential hypertension  Blood pressure is slightly high, however patient stated that she has been checking her blood pressure and it has always been less than 130/80.  Denies headache, chest pain, shortness of breath.  She has been on amlodipine 5 mg, telmisartan 40 mg, and hydrochlorothiazide 12.5 mg daily.  Needs a refill of amlodipine 5 mg.    Mild intermittent asthma without complication  Denies any cough or shortness of breath.  She has been on albuterol as needed, rarely using it    Dyslipidemia  She has been tolerating the statin. Denies muscle pain LFTs has been normal, has been on atorvastatin 10 mg daily    Recurrent major depressive disorder, in partial remission (HCC)  Lately however mood has worsened, she has been on fluoxetine 10 mg, she wonders if she can increase it to 20 mg.  Denies any suicidal ideation or side effects from the medication.          Patient Active Problem List    Diagnosis Date Noted   • Female dyspareunia 07/16/2020   • Post-op pain 12/18/2019   • Deviated septum 08/14/2019   • Recurrent major depressive disorder, in partial remission (HCC) 01/30/2019   • Mild intermittent asthma without complication 01/30/2019   • Carpal tunnel syndrome on right 11/12/2016   • Cervicalgia 09/14/2016   • White coat hypertension 04/04/2016   • Essential hypertension 04/04/2016   • Dyslipidemia 04/04/2016       Current Outpatient Medications   Medication Sig Dispense Refill   •  "amLODIPine (NORVASC) 5 MG Tab Take 1 tablet by mouth every day. 90 tablet 3   • atorvastatin (LIPITOR) 10 MG Tab Take 1 tablet by mouth every day. 90 tablet 0   • telmisartan (MICARDIS) 40 MG Tab TAKE 1 TABLET DAILY 90 Tab 3   • FLUoxetine (PROZAC) 10 MG Cap Take 1 Cap by mouth every day. 90 Cap 3   • traZODone (DESYREL) 50 MG Tab Take 1 Tab by mouth every bedtime. 90 Tab 3   • hydroCHLOROthiazide (HYDRODIURIL) 12.5 MG tablet Take 1 Tab by mouth every day. 90 Tab 3   • Cranberry 450 MG Cap Take 1 Cap by mouth 2 Times a Day.     • Calcium Carb-Cholecalciferol (CALCIUM 600 + D PO) Take 1 Tab by mouth 2 Times a Day.     • albuterol 108 (90 Base) MCG/ACT Aero Soln inhalation aerosol Inhale 2 Puffs by mouth every 6 hours as needed for Shortness of Breath. 8.5 g 3   • ascorbic acid (ASCORBIC ACID) 500 MG Tab Take 500 mg by mouth 2 Times a Day.     • estrogens, conjugated (PREMARIN) 0.625 MG/GM Cream 0.5 g vaginally at night for 2 weeks.  Then every other day (Patient not taking: Reported on 4/29/2021) 30 g 1   • amoxicillin (AMOXIL) 500 MG Cap Take 1 Cap by mouth 3 times a day. (Patient not taking: Reported on 2/5/2021) 21 Cap 0   • ondansetron (ZOFRAN ODT) 4 MG TABLET DISPERSIBLE Take 1 Tab by mouth every 6 hours as needed. (Patient not taking: Reported on 7/16/2020) 10 Tab 1   • diphenhydrAMINE-APAP, sleep,  MG Tab Take 1-2 Tabs by mouth at bedtime as needed.       No current facility-administered medications for this visit.         Allergies as of 04/29/2021   • (No Known Allergies)        ROS:  Denies, chest pain, Shortness of breath, Edema.     /89 (BP Location: Right arm, Patient Position: Sitting, BP Cuff Size: Adult) Comment: WRIST  Pulse 70 Comment: VV  Temp 36.8 °C (98.2 °F) (Temporal) Comment: VV  Ht 1.626 m (5' 4\") Comment: VV  Wt 74.8 kg (165 lb) Comment: VV  LMP  (LMP Unknown)   SpO2 97% Comment: VV  BMI 28.32 kg/m²       Physical Exam:  Constitutional: Alert, no distress, " well-groomed.  Skin: No rashes in visible areas.  Eye: Round. Conjunctiva clear, lNo icterus.   ENMT: Lips pink without lesions, good dentition, moist mucous membranes. Phonation normal.  Neck: No masses, no thyromegaly. Moves freely without pain.  CV: Pulse as reported by patient  Respiratory: Unlabored respiratory effort, no cough or audible wheeze  Psych: Alert and oriented x3, normal affect and mood.          Assessment and Plan.   65 y.o. female with the following issues.    1. Essential hypertension  Slightly high.  However patient stated that she has been checking her blood pressure at home and it has always been less than 130/80.  For now continue on amlodipine 5 mg, telmisartan 40 mg, and hydrochlorothiazide 12.5 mg daily  Patient advised to continue with home blood pressure.  Send me a message with blood pressure log if it is persistently high.    - amLODIPine (NORVASC) 5 MG Tab; Take 1 tablet by mouth every day.  Dispense: 90 tablet; Refill: 3    2. Mild intermittent asthma without complication  Stable.  Asymptomatic.  Continue albuterol as needed    3. Dyslipidemia  He has been tolerating the statin. Denies muscle pain LFTs has been normal  Continue on atorvastatin 10 mg daily    4. Recurrent major depressive disorder, in partial remission (HCC)  Lately however mood has worsened, so okay to increase fluoxetine to 20 mg.

## 2021-04-30 ENCOUNTER — PATIENT MESSAGE (OUTPATIENT)
Dept: MEDICAL GROUP | Facility: MEDICAL CENTER | Age: 66
End: 2021-04-30

## 2021-05-09 ENCOUNTER — PATIENT MESSAGE (OUTPATIENT)
Dept: MEDICAL GROUP | Facility: MEDICAL CENTER | Age: 66
End: 2021-05-09

## 2021-05-09 DIAGNOSIS — I10 ESSENTIAL HYPERTENSION: ICD-10-CM

## 2021-05-09 DIAGNOSIS — F32.A DEPRESSION, UNSPECIFIED DEPRESSION TYPE: ICD-10-CM

## 2021-05-10 NOTE — TELEPHONE ENCOUNTER
From: Agatha Fritz  To: Physician Mila Nicole  Sent: 5/9/2021 2:25 PM PDT  Subject: Prescription Question    Just an FYI. I have asked for RX renewals of fluoxetine & hydrochlorothiazide, done through [facility] on 05/09/21. I also requested fluoxetine renewal through [facility] mail-order pharmacy by phone menu, so you will likely be hearing from them (duplicate request). HCTZ is being requested early due to planned extended travel beginning June 4th (I have 39 tabs remaining and would be cutting it close for that travel). All other medication prescriptions are looking good at this time.    Thank you for all you do,  Agatha Fritz RN

## 2021-05-11 RX ORDER — FLUOXETINE 10 MG/1
CAPSULE ORAL
Qty: 90 CAPSULE | Refills: 0 | Status: SHIPPED | OUTPATIENT
Start: 2021-05-11 | End: 2021-08-03 | Stop reason: SDUPTHER

## 2021-05-11 RX ORDER — HYDROCHLOROTHIAZIDE 12.5 MG/1
12.5 TABLET ORAL DAILY
Qty: 90 TABLET | Refills: 0 | Status: SHIPPED | OUTPATIENT
Start: 2021-05-11 | End: 2021-08-02 | Stop reason: SDUPTHER

## 2021-05-11 RX ORDER — HYDROCHLOROTHIAZIDE 12.5 MG/1
12.5 TABLET ORAL DAILY
Qty: 90 TABLET | Refills: 0 | Status: SHIPPED | OUTPATIENT
Start: 2021-05-11 | End: 2021-11-12 | Stop reason: SDUPTHER

## 2021-05-11 RX ORDER — FLUOXETINE 10 MG/1
10 CAPSULE ORAL DAILY
Qty: 90 CAPSULE | Refills: 0 | Status: SHIPPED | OUTPATIENT
Start: 2021-05-11 | End: 2021-08-03 | Stop reason: SDUPTHER

## 2021-05-11 RX ORDER — FLUOXETINE 10 MG/1
10 CAPSULE ORAL DAILY
Qty: 90 CAPSULE | Refills: 0 | Status: SHIPPED | OUTPATIENT
Start: 2021-05-11 | End: 2021-11-12

## 2021-06-16 DIAGNOSIS — E78.5 DYSLIPIDEMIA: ICD-10-CM

## 2021-06-17 RX ORDER — ATORVASTATIN CALCIUM 10 MG/1
TABLET, FILM COATED ORAL
Qty: 90 TABLET | Refills: 0 | Status: SHIPPED | OUTPATIENT
Start: 2021-06-17 | End: 2021-09-20 | Stop reason: SDUPTHER

## 2021-07-15 ENCOUNTER — PATIENT MESSAGE (OUTPATIENT)
Dept: MEDICAL GROUP | Facility: MEDICAL CENTER | Age: 66
End: 2021-07-15

## 2021-07-15 DIAGNOSIS — Z12.31 SCREENING MAMMOGRAM, ENCOUNTER FOR: ICD-10-CM

## 2021-07-15 NOTE — TELEPHONE ENCOUNTER
From: Agatha Fritz  To: Physician Mila Nicole  Sent: 7/15/2021 9:55 AM PDT  Subject: Non-Urgent Medical Question    I am due an annual mammography. Per your recommendation from our last virtual visit, I am now contacting you requesting an order for this imaging, so that I can contact [facility] to schedule this.  Thank you. Stay cool.  Agatha FINK RN

## 2021-08-02 DIAGNOSIS — I10 ESSENTIAL HYPERTENSION: ICD-10-CM

## 2021-08-02 RX ORDER — HYDROCHLOROTHIAZIDE 12.5 MG/1
12.5 TABLET ORAL DAILY
Qty: 90 TABLET | Refills: 0 | Status: SHIPPED | OUTPATIENT
Start: 2021-08-02 | End: 2021-11-12

## 2021-08-03 ENCOUNTER — PATIENT MESSAGE (OUTPATIENT)
Dept: MEDICAL GROUP | Facility: MEDICAL CENTER | Age: 66
End: 2021-08-03

## 2021-08-03 DIAGNOSIS — F32.A DEPRESSION, UNSPECIFIED DEPRESSION TYPE: ICD-10-CM

## 2021-08-03 RX ORDER — FLUOXETINE 10 MG/1
10 CAPSULE ORAL DAILY
Qty: 90 CAPSULE | Refills: 3 | Status: SHIPPED | OUTPATIENT
Start: 2021-08-03 | End: 2021-11-12

## 2021-08-03 RX ORDER — FLUOXETINE 10 MG/1
CAPSULE ORAL
Qty: 90 CAPSULE | Refills: 3 | Status: SHIPPED | OUTPATIENT
Start: 2021-08-03 | End: 2021-11-12

## 2021-08-03 NOTE — TELEPHONE ENCOUNTER
From: Agatha Fritz  To: Physician Mila Nicole  Sent: 8/3/2021 7:35 AM PDT  Subject: Non-Urgent Medical Question    Also requested from [facility] mail-order pharmacy a renewal RX for fluoxetine 10 mg daily, as no more refills remain, and this cannot be requested on My Chart at this time.

## 2021-09-19 ENCOUNTER — PATIENT MESSAGE (OUTPATIENT)
Dept: MEDICAL GROUP | Facility: MEDICAL CENTER | Age: 66
End: 2021-09-19

## 2021-09-19 DIAGNOSIS — E78.5 DYSLIPIDEMIA: ICD-10-CM

## 2021-09-20 RX ORDER — ATORVASTATIN CALCIUM 10 MG/1
TABLET, FILM COATED ORAL
Qty: 90 TABLET | Refills: 3 | Status: SHIPPED | OUTPATIENT
Start: 2021-09-20 | End: 2021-09-20 | Stop reason: SDUPTHER

## 2021-09-20 RX ORDER — ATORVASTATIN CALCIUM 10 MG/1
TABLET, FILM COATED ORAL
Qty: 90 TABLET | Refills: 3 | Status: SHIPPED | OUTPATIENT
Start: 2021-09-20 | End: 2022-07-01 | Stop reason: SDUPTHER

## 2021-09-20 NOTE — TELEPHONE ENCOUNTER
From: Agatha Fritz  To: Physician Mila Nicole  Sent: 9/19/2021 6:21 AM PDT  Subject: Prescription renewal    I am currently needing a renewed prescription of atorvastatin 10 mg sent to [facility], as I have no more refills on the previous RX. I do have a 2-week supply remaining. Thank you for attending to this.  Agatha FINK RN

## 2021-09-23 VITALS — SYSTOLIC BLOOD PRESSURE: 124 MMHG | DIASTOLIC BLOOD PRESSURE: 84 MMHG

## 2021-11-12 ENCOUNTER — OFFICE VISIT (OUTPATIENT)
Dept: MEDICAL GROUP | Facility: MEDICAL CENTER | Age: 66
End: 2021-11-12
Payer: COMMERCIAL

## 2021-11-12 VITALS
OXYGEN SATURATION: 97 % | DIASTOLIC BLOOD PRESSURE: 62 MMHG | HEART RATE: 71 BPM | HEIGHT: 64 IN | SYSTOLIC BLOOD PRESSURE: 112 MMHG | WEIGHT: 165.2 LBS | TEMPERATURE: 97.7 F | BODY MASS INDEX: 28.2 KG/M2

## 2021-11-12 DIAGNOSIS — R07.9 CHEST PAIN, UNSPECIFIED TYPE: ICD-10-CM

## 2021-11-12 DIAGNOSIS — I10 ESSENTIAL HYPERTENSION: ICD-10-CM

## 2021-11-12 DIAGNOSIS — J45.20 MILD INTERMITTENT ASTHMA WITHOUT COMPLICATION: ICD-10-CM

## 2021-11-12 DIAGNOSIS — E78.5 DYSLIPIDEMIA: ICD-10-CM

## 2021-11-12 DIAGNOSIS — F33.41 RECURRENT MAJOR DEPRESSIVE DISORDER, IN PARTIAL REMISSION (HCC): ICD-10-CM

## 2021-11-12 PROCEDURE — 99214 OFFICE O/P EST MOD 30 MIN: CPT | Performed by: FAMILY MEDICINE

## 2021-11-12 RX ORDER — FLUOXETINE HYDROCHLORIDE 20 MG/1
20 CAPSULE ORAL DAILY
Qty: 90 CAPSULE | Refills: 2 | Status: SHIPPED | OUTPATIENT
Start: 2021-11-12 | End: 2023-02-07

## 2021-11-12 RX ORDER — HYDROCHLOROTHIAZIDE 12.5 MG/1
12.5 TABLET ORAL DAILY
Qty: 90 TABLET | Refills: 0 | Status: SHIPPED | OUTPATIENT
Start: 2021-11-12 | End: 2022-02-22

## 2021-11-12 RX ORDER — ALBUTEROL SULFATE 90 UG/1
2 AEROSOL, METERED RESPIRATORY (INHALATION) EVERY 4 HOURS PRN
Qty: 1 EACH | Refills: 2 | Status: SHIPPED | OUTPATIENT
Start: 2021-11-12 | End: 2023-08-24

## 2021-11-12 ASSESSMENT — FIBROSIS 4 INDEX: FIB4 SCORE: 1.51

## 2021-11-12 ASSESSMENT — PATIENT HEALTH QUESTIONNAIRE - PHQ9
4. FEELING TIRED OR HAVING LITTLE ENERGY: 1
9. THOUGHTS THAT YOU WOULD BE BETTER OFF DEAD, OR OF HURTING YOURSELF: 0
CLINICAL INTERPRETATION OF PHQ2 SCORE: 2
7. TROUBLE CONCENTRATING ON THINGS, SUCH AS READING THE NEWSPAPER OR WATCHING TELEVISION: 0
1. LITTLE INTEREST OR PLEASURE IN DOING THINGS: 1
SUM OF ALL RESPONSES TO PHQ9 QUESTIONS 1 AND 2: 3
8. MOVING OR SPEAKING SO SLOWLY THAT OTHER PEOPLE COULD HAVE NOTICED. OR THE OPPOSITE, BEING SO FIGETY OR RESTLESS THAT YOU HAVE BEEN MOVING AROUND A LOT MORE THAN USUAL: 0
5. POOR APPETITE OR OVEREATING: 2
SUM OF ALL RESPONSES TO PHQ QUESTIONS 1-9: 9
3. TROUBLE FALLING OR STAYING ASLEEP OR SLEEPING TOO MUCH: 2
5. POOR APPETITE OR OVEREATING: 0 - NOT AT ALL
SUM OF ALL RESPONSES TO PHQ QUESTIONS 1-9: 7
6. FEELING BAD ABOUT YOURSELF - OR THAT YOU ARE A FAILURE OR HAVE LET YOURSELF OR YOUR FAMILY DOWN: 1
2. FEELING DOWN, DEPRESSED, IRRITABLE, OR HOPELESS: 2

## 2021-11-12 NOTE — PROGRESS NOTES
CC: Hypertension, hyperlipidemia, depression, acid    HPI:   Agatha presents today to discuss the following:    Essential hypertension  Blood pressure has been adequately controlled on hydrochlorothiazide 12.5 mg daily, amlodipine 5 mg daily, telmisartan 40 mg daily.  No side effects    Hyperlipidemia  She has been tolerating the statin. Denies muscle pain LFTs has been normal, patient is currently on atorvastatin 10 mg daily.  No history of CAD, stroke, or diabetes    Recurrent major depressive disorder, in partial remission (HCC)  Patient's reported worsening of mood.  Reported stated has multiple losses during this year.  PHQ a score is 9.  However patient denies suicidal ideation.  She has been tolerating fluoxetine 10 mg daily    Mild intermittent asthma without complication  Denies any cough or shortness of breath.  She has been on albuterol as needed, has been used once in a while.  Use it more during the wintertime.    Chest pain, unspecified type  Intermittent chest pain once in a while(once or twice a year), concerned about angina.  Previous cardiac scoring last 24.  Currently asymptomatic.  No acute problem in the EKG today.    Patient Active Problem List    Diagnosis Date Noted   • Female dyspareunia 07/16/2020   • Post-op pain 12/18/2019   • Deviated septum 08/14/2019   • Recurrent major depressive disorder, in partial remission (HCC) 01/30/2019   • Mild intermittent asthma without complication 01/30/2019   • Carpal tunnel syndrome on right 11/12/2016   • Cervicalgia 09/14/2016   • White coat hypertension 04/04/2016   • Essential hypertension 04/04/2016   • Dyslipidemia 04/04/2016       Current Outpatient Medications   Medication Sig Dispense Refill   • atorvastatin (LIPITOR) 10 MG Tab Take 1 tablet by mouth once daily 90 Tablet 3   • FLUoxetine (PROZAC) 10 MG Cap Take 1 capsule by mouth every day. 90 capsule 3   • FLUoxetine (PROZAC) 10 MG Cap TAKE 1 CAPSULE DAILY 90 capsule 3   • hydroCHLOROthiazide  "(HYDRODIURIL) 12.5 MG tablet Take 1 tablet by mouth every day. 90 tablet 0   • FLUoxetine (PROZAC) 10 MG Cap Take 1 capsule by mouth every day. 90 capsule 0   • hydroCHLOROthiazide (HYDRODIURIL) 12.5 MG tablet Take 1 tablet by mouth every day. 90 tablet 0   • amLODIPine (NORVASC) 5 MG Tab Take 1 tablet by mouth every day. 90 tablet 3   • telmisartan (MICARDIS) 40 MG Tab TAKE 1 TABLET DAILY 90 Tab 3   • estrogens, conjugated (PREMARIN) 0.625 MG/GM Cream 0.5 g vaginally at night for 2 weeks.  Then every other day (Patient not taking: Reported on 4/29/2021) 30 g 1   • traZODone (DESYREL) 50 MG Tab Take 1 Tab by mouth every bedtime. 90 Tab 3   • amoxicillin (AMOXIL) 500 MG Cap Take 1 Cap by mouth 3 times a day. (Patient not taking: Reported on 2/5/2021) 21 Cap 0   • ondansetron (ZOFRAN ODT) 4 MG TABLET DISPERSIBLE Take 1 Tab by mouth every 6 hours as needed. (Patient not taking: Reported on 7/16/2020) 10 Tab 1   • Cranberry 450 MG Cap Take 1 Cap by mouth 2 Times a Day.     • diphenhydrAMINE-APAP, sleep,  MG Tab Take 1-2 Tabs by mouth at bedtime as needed.     • Calcium Carb-Cholecalciferol (CALCIUM 600 + D PO) Take 1 Tab by mouth 2 Times a Day.     • albuterol 108 (90 Base) MCG/ACT Aero Soln inhalation aerosol Inhale 2 Puffs by mouth every 6 hours as needed for Shortness of Breath. 8.5 g 3   • ascorbic acid (ASCORBIC ACID) 500 MG Tab Take 500 mg by mouth 2 Times a Day.       No current facility-administered medications for this visit.         Allergies as of 11/12/2021   • (No Known Allergies)        ROS: Denies any chest pain, Shortness of breath, Changes bowel or bladder, Lower extremity edema.    Physical Exam:  /62 (BP Location: Right arm, Patient Position: Sitting, BP Cuff Size: Adult)   Pulse 71   Temp 36.5 °C (97.7 °F) (Temporal)   Ht 1.626 m (5' 4\")   Wt 74.9 kg (165 lb 3.2 oz)   LMP  (LMP Unknown)   SpO2 97%   BMI 28.36 kg/m²   Gen.: Well-developed, well-nourished, no apparent " distress,pleasant and cooperative with the examination  Skin:  Warm and dry with good turgor. No rashes or suspicious lesions in visible areas  HEENT:Sinuses nontender with palpation, TMs clear, nares patent with pink mucosa and clear rhinorrhea,no septal deviation ,polyps or lesions. lips without lesions, oropharynx clear.  Neck: Trachea midline,no masses or adenopathy. No JVD.  Cor: Regular rate and rhythm without murmur, gallop or rub.  Lungs: Respirations unlabored.Clear to auscultation with equal breath sounds bilaterally. No wheezes, rhonchi.  Extremities: No cyanosis, clubbing or edema.      Assessment and Plan.   66 y.o. female     1. Essential hypertension  Controlled.  Continue on hydrochlorothiazide 12.5 mg daily, amlodipine 5 mg daily, telmisartan 40 mg daily.    - hydroCHLOROthiazide (HYDRODIURIL) 12.5 MG tablet; Take 1 Tablet by mouth every day.  Dispense: 90 Tablet; Refill: 0    2. Dyslipidemia  He has been tolerating the statin. Denies muscle pain LFTs has been normal  Continue on atorvastatin 10 mg daily    3. Recurrent major depressive disorder, in partial remission (HCC)  Worsening mood.  PHQ is 9  Increase fluoxetine to 20 mg daily.    - FLUoxetine (PROZAC) 20 MG Cap; Take 1 Capsule by mouth every day.  Dispense: 90 Capsule; Refill: 2    4. Mild intermittent asthma without complication  Stable.  Continue albuterol as needed, more during the winter, needs medication refill    - albuterol 108 (90 Base) MCG/ACT Aero Soln inhalation aerosol; Inhale 2 Puffs every four hours as needed for Shortness of Breath.  Dispense: 1 Each; Refill: 2    5. Chest pain, unspecified type  Intermittent chest pain once in a while(once or twice a year), concerned about angina.  Previous cardiac scoring last 24.  Currently asymptomatic  EKG today showed bradycardia and right bundle branch block . Both finding present in previous EKG.  No acute problems.    - NM-CARDIAC STRESS TEST; Future  - EKG - Clinic Performed

## 2021-11-29 ENCOUNTER — APPOINTMENT (OUTPATIENT)
Dept: RADIOLOGY | Facility: MEDICAL CENTER | Age: 66
End: 2021-11-29
Attending: FAMILY MEDICINE
Payer: COMMERCIAL

## 2021-12-01 ENCOUNTER — PATIENT MESSAGE (OUTPATIENT)
Dept: MEDICAL GROUP | Facility: MEDICAL CENTER | Age: 66
End: 2021-12-01

## 2021-12-01 NOTE — TELEPHONE ENCOUNTER
From: Agatha Fritz  To: Physician Mila Nicole  Sent: 12/1/2021 3:06 PM PST  Subject: Cardiac Stress Test (chemical)    Dr. Nicole, I have elected to not proceed with the cardiac stress test you ordered due to out-of-pocket cost of $1100, along with the infrequency and mildness of any chest pain I had in the past. I will revisit diagnostics if symptoms interfere with sleep or activity (but I hiked 6 miles yesterday on [address] without any issues). I will keep the order (valid for one year) on file in case it is needed in the future.  Thank you,  Agatha Fritz RN

## 2021-12-27 ENCOUNTER — PATIENT MESSAGE (OUTPATIENT)
Dept: MEDICAL GROUP | Facility: MEDICAL CENTER | Age: 66
End: 2021-12-27

## 2021-12-27 DIAGNOSIS — J45.20 MILD INTERMITTENT ASTHMA WITHOUT COMPLICATION: ICD-10-CM

## 2021-12-27 DIAGNOSIS — J40 BRONCHITIS: ICD-10-CM

## 2021-12-30 RX ORDER — AZITHROMYCIN 250 MG/1
TABLET, FILM COATED ORAL
Qty: 6 TABLET | Refills: 0 | Status: SHIPPED | OUTPATIENT
Start: 2021-12-30 | End: 2023-08-24

## 2022-01-03 ENCOUNTER — PATIENT MESSAGE (OUTPATIENT)
Dept: MEDICAL GROUP | Facility: MEDICAL CENTER | Age: 67
End: 2022-01-03

## 2022-01-03 NOTE — TELEPHONE ENCOUNTER
From: Agatha Fritz  To: Physician Mila Nicole  Sent: 1/3/2022 9:51 AM PST  Subject: Congestion    Update: I am still having significant chest & nasal congestion. It has improved from mucopurulent to serous. The prescription for the Zpak went to the mail order pharmacy [facility] and not [facility], so I will not see those meds until Wednesday or later. I know it's confusing having 2 pharmacies. Next year, I will be dropping the mail order pharmacy in May, when I retire, and will go exclusively with [facility] at that time.

## 2022-01-21 ENCOUNTER — PATIENT MESSAGE (OUTPATIENT)
Dept: MEDICAL GROUP | Facility: MEDICAL CENTER | Age: 67
End: 2022-01-21

## 2022-01-25 ENCOUNTER — PATIENT MESSAGE (OUTPATIENT)
Dept: MEDICAL GROUP | Facility: MEDICAL CENTER | Age: 67
End: 2022-01-25

## 2022-02-17 ENCOUNTER — PATIENT MESSAGE (OUTPATIENT)
Dept: MEDICAL GROUP | Facility: MEDICAL CENTER | Age: 67
End: 2022-02-17
Payer: COMMERCIAL

## 2022-02-17 DIAGNOSIS — J45.40 MODERATE PERSISTENT ASTHMA WITHOUT COMPLICATION: ICD-10-CM

## 2022-02-20 DIAGNOSIS — I10 ESSENTIAL HYPERTENSION: ICD-10-CM

## 2022-02-22 DIAGNOSIS — J45.40 MODERATE PERSISTENT ASTHMA WITHOUT COMPLICATION: ICD-10-CM

## 2022-02-22 RX ORDER — HYDROCHLOROTHIAZIDE 12.5 MG/1
TABLET ORAL
Qty: 90 TABLET | Refills: 0 | Status: SHIPPED | OUTPATIENT
Start: 2022-02-22 | End: 2022-07-01 | Stop reason: SDUPTHER

## 2022-02-22 RX ORDER — TELMISARTAN 40 MG/1
TABLET ORAL
Qty: 90 TABLET | Refills: 3 | Status: SHIPPED | OUTPATIENT
Start: 2022-02-22 | End: 2022-07-01 | Stop reason: SDUPTHER

## 2022-04-16 ENCOUNTER — PATIENT MESSAGE (OUTPATIENT)
Dept: MEDICAL GROUP | Facility: MEDICAL CENTER | Age: 67
End: 2022-04-16
Payer: COMMERCIAL

## 2022-04-16 DIAGNOSIS — I10 ESSENTIAL HYPERTENSION: ICD-10-CM

## 2022-04-18 RX ORDER — AMLODIPINE BESYLATE 5 MG/1
5 TABLET ORAL DAILY
Qty: 90 TABLET | Refills: 3 | Status: SHIPPED | OUTPATIENT
Start: 2022-04-18 | End: 2022-07-01 | Stop reason: SDUPTHER

## 2022-06-30 ENCOUNTER — PATIENT MESSAGE (OUTPATIENT)
Dept: MEDICAL GROUP | Facility: MEDICAL CENTER | Age: 67
End: 2022-06-30
Payer: COMMERCIAL

## 2022-06-30 DIAGNOSIS — I10 ESSENTIAL HYPERTENSION: ICD-10-CM

## 2022-06-30 DIAGNOSIS — E78.5 DYSLIPIDEMIA: ICD-10-CM

## 2022-07-01 RX ORDER — HYDROCHLOROTHIAZIDE 12.5 MG/1
12.5 TABLET ORAL DAILY
Qty: 90 TABLET | Refills: 2 | Status: SHIPPED | OUTPATIENT
Start: 2022-07-01 | End: 2023-03-20

## 2022-07-01 RX ORDER — AMLODIPINE BESYLATE 5 MG/1
5 TABLET ORAL DAILY
Qty: 90 TABLET | Refills: 3 | Status: SHIPPED | OUTPATIENT
Start: 2022-07-01 | End: 2023-07-17 | Stop reason: SDUPTHER

## 2022-07-01 RX ORDER — ATORVASTATIN CALCIUM 10 MG/1
TABLET, FILM COATED ORAL
Qty: 90 TABLET | Refills: 3 | Status: SHIPPED | OUTPATIENT
Start: 2022-07-01 | End: 2023-07-17 | Stop reason: SDUPTHER

## 2022-07-01 RX ORDER — TELMISARTAN 40 MG/1
40 TABLET ORAL DAILY
Qty: 90 TABLET | Refills: 3 | Status: SHIPPED | OUTPATIENT
Start: 2022-07-01 | End: 2023-07-17 | Stop reason: SDUPTHER

## 2022-08-01 ENCOUNTER — OFFICE VISIT (OUTPATIENT)
Dept: MEDICAL GROUP | Facility: MEDICAL CENTER | Age: 67
End: 2022-08-01
Payer: COMMERCIAL

## 2022-08-01 VITALS
WEIGHT: 159.39 LBS | TEMPERATURE: 98 F | OXYGEN SATURATION: 97 % | HEART RATE: 71 BPM | RESPIRATION RATE: 16 BRPM | DIASTOLIC BLOOD PRESSURE: 58 MMHG | SYSTOLIC BLOOD PRESSURE: 116 MMHG | BODY MASS INDEX: 27.21 KG/M2 | HEIGHT: 64 IN

## 2022-08-01 DIAGNOSIS — I10 ESSENTIAL HYPERTENSION: ICD-10-CM

## 2022-08-01 DIAGNOSIS — J45.40 MODERATE PERSISTENT ASTHMA WITHOUT COMPLICATION: ICD-10-CM

## 2022-08-01 DIAGNOSIS — F33.41 RECURRENT MAJOR DEPRESSIVE DISORDER, IN PARTIAL REMISSION (HCC): ICD-10-CM

## 2022-08-01 DIAGNOSIS — Z12.31 ENCOUNTER FOR SCREENING MAMMOGRAM FOR BREAST CANCER: ICD-10-CM

## 2022-08-01 DIAGNOSIS — E78.5 DYSLIPIDEMIA: ICD-10-CM

## 2022-08-01 PROCEDURE — 99214 OFFICE O/P EST MOD 30 MIN: CPT | Performed by: FAMILY MEDICINE

## 2022-08-01 ASSESSMENT — PATIENT HEALTH QUESTIONNAIRE - PHQ9
8. MOVING OR SPEAKING SO SLOWLY THAT OTHER PEOPLE COULD HAVE NOTICED. OR THE OPPOSITE, BEING SO FIGETY OR RESTLESS THAT YOU HAVE BEEN MOVING AROUND A LOT MORE THAN USUAL: 1
4. FEELING TIRED OR HAVING LITTLE ENERGY: 1
9. THOUGHTS THAT YOU WOULD BE BETTER OFF DEAD, OR OF HURTING YOURSELF: 0
2. FEELING DOWN, DEPRESSED, IRRITABLE, OR HOPELESS: 1
1. LITTLE INTEREST OR PLEASURE IN DOING THINGS: 1
6. FEELING BAD ABOUT YOURSELF - OR THAT YOU ARE A FAILURE OR HAVE LET YOURSELF OR YOUR FAMILY DOWN: 1
SUM OF ALL RESPONSES TO PHQ9 QUESTIONS 1 AND 2: 2
SUM OF ALL RESPONSES TO PHQ QUESTIONS 1-9: 8
3. TROUBLE FALLING OR STAYING ASLEEP OR SLEEPING TOO MUCH: 1
5. POOR APPETITE OR OVEREATING: 1
7. TROUBLE CONCENTRATING ON THINGS, SUCH AS READING THE NEWSPAPER OR WATCHING TELEVISION: 1
CLINICAL INTERPRETATION OF PHQ2 SCORE: 0

## 2022-08-01 ASSESSMENT — FIBROSIS 4 INDEX: FIB4 SCORE: 1.529105703085214927

## 2022-08-01 NOTE — PROGRESS NOTES
CC: Hypertension, hyperlipidemia, asthma, depression    HPI:   Agatha presents today to discuss the following medical issues:    Essential hypertension  Blood pressure has been adequately controlled on hydrochlorothiazide 12.5 mg daily, amlodipine 5 mg daily, telmisartan 40 mg daily.  No side effects     Hyperlipidemia  She has been tolerating the statin. Denies muscle pain LFTs has been normal, patient is currently on atorvastatin 10 mg daily.  No history of CAD, stroke, or diabetes     Moderate persistent asthma without complication  Patient is currently asymptomatic.  Denies any cough or shortness of breath.  He has been doing fine on Advair twice a day and albuterol as needed.    Recurrent major depressive disorder, in partial remission (HCC)  Patient's reported worsening of mood.  Reported stated has multiple losses during this year.  PHQ a score is 8.  However patient denies suicidal ideation    Patient is due for mammogram      Patient Active Problem List    Diagnosis Date Noted   • Female dyspareunia 07/16/2020   • Post-op pain 12/18/2019   • Deviated septum 08/14/2019   • Recurrent major depressive disorder, in partial remission (HCC) 01/30/2019   • Mild intermittent asthma without complication 01/30/2019   • Carpal tunnel syndrome on right 11/12/2016   • Cervicalgia 09/14/2016   • White coat hypertension 04/04/2016   • Essential hypertension 04/04/2016   • Dyslipidemia 04/04/2016       Current Outpatient Medications   Medication Sig Dispense Refill   • telmisartan (MICARDIS) 40 MG Tab Take 1 Tablet by mouth every day. 90 Tablet 3   • hydroCHLOROthiazide (HYDRODIURIL) 12.5 MG tablet Take 1 Tablet by mouth every day. 90 Tablet 2   • atorvastatin (LIPITOR) 10 MG Tab Take 1 tablet by mouth once daily 90 Tablet 3   • amLODIPine (NORVASC) 5 MG Tab Take 1 Tablet by mouth every day. 90 Tablet 3   • fluticasone-salmeterol (ADVAIR) 250-50 MCG/DOSE AEROSOL POWDER, BREATH ACTIVATED Inhale 1 Puff 2 times a day. 60 Each  "3   • azithromycin (ZITHROMAX) 250 MG Tab 500 mg(2 tablets) the 1st day, then 250 mg daily for 4 days 6 Tablet 0   • Mometasone Furo-Formoterol Fum (DULERA) 100-5 MCG/ACT Aerosol Inhale 1 Puff 2 times a day. 13 g 2   • albuterol 108 (90 Base) MCG/ACT Aero Soln inhalation aerosol Inhale 2 Puffs every four hours as needed for Shortness of Breath. 1 Each 2   • traZODone (DESYREL) 50 MG Tab Take 1 Tab by mouth every bedtime. 90 Tab 3   • Cranberry 450 MG Cap Take 1 Cap by mouth 2 Times a Day.     • diphenhydrAMINE-APAP, sleep,  MG Tab Take 1-2 Tabs by mouth at bedtime as needed.     • Calcium Carb-Cholecalciferol (CALCIUM 600 + D PO) Take 1 Tab by mouth 2 Times a Day.     • albuterol 108 (90 Base) MCG/ACT Aero Soln inhalation aerosol Inhale 2 Puffs by mouth every 6 hours as needed for Shortness of Breath. 8.5 g 3   • ascorbic acid (ASCORBIC ACID) 500 MG Tab Take 500 mg by mouth 2 Times a Day.     • FLUoxetine (PROZAC) 20 MG Cap Take 1 Capsule by mouth every day. (Patient not taking: Reported on 8/1/2022) 90 Capsule 2   • estrogens, conjugated (PREMARIN) 0.625 MG/GM Cream 0.5 g vaginally at night for 2 weeks.  Then every other day (Patient not taking: No sig reported) 30 g 1     No current facility-administered medications for this visit.         Allergies as of 08/01/2022   • (No Known Allergies)        ROS: Denies any chest pain, Shortness of breath, Changes bowel or bladder, Lower extremity edema.    Physical Exam:  /58 (BP Location: Left arm, Patient Position: Sitting, BP Cuff Size: Adult)   Pulse 71   Temp 36.7 °C (98 °F) (Temporal)   Resp 16   Ht 1.626 m (5' 4\")   Wt 72.3 kg (159 lb 6.3 oz)   LMP  (LMP Unknown)   SpO2 97%   BMI 27.36 kg/m²   Gen.: Well-developed, well-nourished, no apparent distress,pleasant and cooperative with the examination  Skin:  Warm and dry with good turgor. No rashes or suspicious lesions in visible areas  HEENT:Sinuses nontender with palpation, TMs clear, nares patent " with pink mucosa and clear rhinorrhea,no septal deviation ,polyps or lesions. lips without lesions, oropharynx clear.  Neck: Trachea midline,no masses or adenopathy. No JVD.  Cor: Regular rate and rhythm without murmur, gallop or rub.  Lungs: Respirations unlabored.Clear to auscultation with equal breath sounds bilaterally. No wheezes, rhonchi.  Extremities: No cyanosis, clubbing or edema.        Assessment and Plan.   67 y.o. female     1. Essential hypertension  Controlled.  Continue on hydrochlorothiazide 12.5 mg daily, amlodipine 5 mg daily, telmisartan 40 mg daily.    - CBC WITH DIFFERENTIAL; Future  - Comp Metabolic Panel; Future  - Lipid Profile; Future  - TSH; Future    2. Dyslipidemia  He has been tolerating the statin. Denies muscle pain LFTs has been normal  Continue on atorvastatin 10 mg daily    - Lipid Profile; Future  - TSH; Future    3. Moderate persistent asthma without complication  Stable.  Asymptomatic.  Continue on Advair twice a day and albuterol as needed    4. Recurrent major depressive disorder, in partial remission (HCC)  Daily.  No suicidal ideation.    Continue on Prozac 10 mg daily    5. Encounter for screening mammogram for breast cancer  Due for mammogram, order placed    - MA-SCREENING MAMMO BILAT W/CAD; Future

## 2022-08-18 ENCOUNTER — HOSPITAL ENCOUNTER (OUTPATIENT)
Dept: LAB | Facility: MEDICAL CENTER | Age: 67
End: 2022-08-18
Attending: FAMILY MEDICINE
Payer: COMMERCIAL

## 2022-08-18 DIAGNOSIS — I10 ESSENTIAL HYPERTENSION: ICD-10-CM

## 2022-08-18 DIAGNOSIS — E78.5 DYSLIPIDEMIA: ICD-10-CM

## 2022-08-18 LAB
ALBUMIN SERPL BCP-MCNC: 4.4 G/DL (ref 3.2–4.9)
ALBUMIN/GLOB SERPL: 1.6 G/DL
ALP SERPL-CCNC: 95 U/L (ref 30–99)
ALT SERPL-CCNC: 22 U/L (ref 2–50)
ANION GAP SERPL CALC-SCNC: 13 MMOL/L (ref 7–16)
AST SERPL-CCNC: 30 U/L (ref 12–45)
BASOPHILS # BLD AUTO: 0.7 % (ref 0–1.8)
BASOPHILS # BLD: 0.03 K/UL (ref 0–0.12)
BILIRUB SERPL-MCNC: 0.4 MG/DL (ref 0.1–1.5)
BUN SERPL-MCNC: 12 MG/DL (ref 8–22)
CALCIUM SERPL-MCNC: 9.8 MG/DL (ref 8.5–10.5)
CHLORIDE SERPL-SCNC: 104 MMOL/L (ref 96–112)
CHOLEST SERPL-MCNC: 189 MG/DL (ref 100–199)
CO2 SERPL-SCNC: 23 MMOL/L (ref 20–33)
CREAT SERPL-MCNC: 0.7 MG/DL (ref 0.5–1.4)
EOSINOPHIL # BLD AUTO: 0.18 K/UL (ref 0–0.51)
EOSINOPHIL NFR BLD: 3.9 % (ref 0–6.9)
ERYTHROCYTE [DISTWIDTH] IN BLOOD BY AUTOMATED COUNT: 44.5 FL (ref 35.9–50)
GFR SERPLBLD CREATININE-BSD FMLA CKD-EPI: 95 ML/MIN/1.73 M 2
GLOBULIN SER CALC-MCNC: 2.7 G/DL (ref 1.9–3.5)
GLUCOSE SERPL-MCNC: 91 MG/DL (ref 65–99)
HCT VFR BLD AUTO: 45.2 % (ref 37–47)
HDLC SERPL-MCNC: 86 MG/DL
HGB BLD-MCNC: 15.2 G/DL (ref 12–16)
IMM GRANULOCYTES # BLD AUTO: 0.02 K/UL (ref 0–0.11)
IMM GRANULOCYTES NFR BLD AUTO: 0.4 % (ref 0–0.9)
LDLC SERPL CALC-MCNC: 89 MG/DL
LYMPHOCYTES # BLD AUTO: 1.94 K/UL (ref 1–4.8)
LYMPHOCYTES NFR BLD: 42.4 % (ref 22–41)
MCH RBC QN AUTO: 31.1 PG (ref 27–33)
MCHC RBC AUTO-ENTMCNC: 33.6 G/DL (ref 33.6–35)
MCV RBC AUTO: 92.6 FL (ref 81.4–97.8)
MONOCYTES # BLD AUTO: 0.52 K/UL (ref 0–0.85)
MONOCYTES NFR BLD AUTO: 11.4 % (ref 0–13.4)
NEUTROPHILS # BLD AUTO: 1.89 K/UL (ref 2–7.15)
NEUTROPHILS NFR BLD: 41.2 % (ref 44–72)
NRBC # BLD AUTO: 0 K/UL
NRBC BLD-RTO: 0 /100 WBC
PLATELET # BLD AUTO: 294 K/UL (ref 164–446)
PMV BLD AUTO: 9.7 FL (ref 9–12.9)
POTASSIUM SERPL-SCNC: 4.5 MMOL/L (ref 3.6–5.5)
PROT SERPL-MCNC: 7.1 G/DL (ref 6–8.2)
RBC # BLD AUTO: 4.88 M/UL (ref 4.2–5.4)
SODIUM SERPL-SCNC: 140 MMOL/L (ref 135–145)
TRIGL SERPL-MCNC: 72 MG/DL (ref 0–149)
TSH SERPL DL<=0.005 MIU/L-ACNC: 1.16 UIU/ML (ref 0.38–5.33)
WBC # BLD AUTO: 4.6 K/UL (ref 4.8–10.8)

## 2022-08-18 PROCEDURE — 80053 COMPREHEN METABOLIC PANEL: CPT

## 2022-08-18 PROCEDURE — 85025 COMPLETE CBC W/AUTO DIFF WBC: CPT

## 2022-08-18 PROCEDURE — 36415 COLL VENOUS BLD VENIPUNCTURE: CPT

## 2022-08-18 PROCEDURE — 84443 ASSAY THYROID STIM HORMONE: CPT

## 2022-08-18 PROCEDURE — 80061 LIPID PANEL: CPT

## 2022-08-19 ENCOUNTER — HOSPITAL ENCOUNTER (OUTPATIENT)
Dept: RADIOLOGY | Facility: MEDICAL CENTER | Age: 67
End: 2022-08-19
Attending: FAMILY MEDICINE
Payer: COMMERCIAL

## 2022-08-19 DIAGNOSIS — Z12.31 VISIT FOR SCREENING MAMMOGRAM: ICD-10-CM

## 2022-08-19 PROCEDURE — 77063 BREAST TOMOSYNTHESIS BI: CPT

## 2022-08-25 ENCOUNTER — HOSPITAL ENCOUNTER (OUTPATIENT)
Dept: RADIOLOGY | Facility: MEDICAL CENTER | Age: 67
End: 2022-08-25
Payer: COMMERCIAL

## 2023-01-24 ENCOUNTER — HOSPITAL ENCOUNTER (OUTPATIENT)
Dept: CARDIOLOGY | Facility: MEDICAL CENTER | Age: 68
End: 2023-01-24
Attending: ANESTHESIOLOGY
Payer: COMMERCIAL

## 2023-01-24 ENCOUNTER — HOSPITAL ENCOUNTER (OUTPATIENT)
Dept: LAB | Facility: MEDICAL CENTER | Age: 68
End: 2023-01-24
Attending: ANESTHESIOLOGY
Payer: COMMERCIAL

## 2023-01-24 LAB
ANION GAP SERPL CALC-SCNC: 14 MMOL/L (ref 7–16)
BUN SERPL-MCNC: 17 MG/DL (ref 8–22)
CALCIUM SERPL-MCNC: 10.2 MG/DL (ref 8.5–10.5)
CHLORIDE SERPL-SCNC: 102 MMOL/L (ref 96–112)
CO2 SERPL-SCNC: 23 MMOL/L (ref 20–33)
CREAT SERPL-MCNC: 0.76 MG/DL (ref 0.5–1.4)
EKG IMPRESSION: NORMAL
GFR SERPLBLD CREATININE-BSD FMLA CKD-EPI: 86 ML/MIN/1.73 M 2
GLUCOSE SERPL-MCNC: 97 MG/DL (ref 65–99)
POTASSIUM SERPL-SCNC: 3.7 MMOL/L (ref 3.6–5.5)
SODIUM SERPL-SCNC: 139 MMOL/L (ref 135–145)

## 2023-01-24 PROCEDURE — 93005 ELECTROCARDIOGRAM TRACING: CPT | Performed by: ANESTHESIOLOGY

## 2023-01-24 PROCEDURE — 93010 ELECTROCARDIOGRAM REPORT: CPT | Performed by: INTERNAL MEDICINE

## 2023-01-24 PROCEDURE — 80048 BASIC METABOLIC PNL TOTAL CA: CPT

## 2023-01-24 PROCEDURE — 36415 COLL VENOUS BLD VENIPUNCTURE: CPT

## 2023-02-07 ENCOUNTER — OFFICE VISIT (OUTPATIENT)
Dept: MEDICAL GROUP | Facility: MEDICAL CENTER | Age: 68
End: 2023-02-07
Payer: COMMERCIAL

## 2023-02-07 VITALS
BODY MASS INDEX: 27.49 KG/M2 | HEART RATE: 75 BPM | DIASTOLIC BLOOD PRESSURE: 72 MMHG | WEIGHT: 161 LBS | TEMPERATURE: 97.2 F | SYSTOLIC BLOOD PRESSURE: 118 MMHG | HEIGHT: 64 IN | RESPIRATION RATE: 16 BRPM | OXYGEN SATURATION: 97 %

## 2023-02-07 DIAGNOSIS — F51.04 CHRONIC INSOMNIA: ICD-10-CM

## 2023-02-07 DIAGNOSIS — J45.20 MILD INTERMITTENT ASTHMA WITHOUT COMPLICATION: ICD-10-CM

## 2023-02-07 DIAGNOSIS — F33.41 RECURRENT MAJOR DEPRESSIVE DISORDER, IN PARTIAL REMISSION (HCC): ICD-10-CM

## 2023-02-07 DIAGNOSIS — E78.5 DYSLIPIDEMIA: ICD-10-CM

## 2023-02-07 DIAGNOSIS — Z78.0 ASYMPTOMATIC MENOPAUSAL STATE: ICD-10-CM

## 2023-02-07 DIAGNOSIS — I10 ESSENTIAL HYPERTENSION: ICD-10-CM

## 2023-02-07 PROCEDURE — 99214 OFFICE O/P EST MOD 30 MIN: CPT | Performed by: FAMILY MEDICINE

## 2023-02-07 RX ORDER — FLUOXETINE 10 MG/1
10 CAPSULE ORAL DAILY
Qty: 90 CAPSULE | Refills: 2 | Status: SHIPPED | OUTPATIENT
Start: 2023-02-07 | End: 2024-01-18 | Stop reason: SDUPTHER

## 2023-02-07 RX ORDER — TRAZODONE HYDROCHLORIDE 50 MG/1
50 TABLET ORAL
Qty: 90 TABLET | Refills: 3 | Status: SHIPPED | OUTPATIENT
Start: 2023-02-07

## 2023-02-07 ASSESSMENT — PATIENT HEALTH QUESTIONNAIRE - PHQ9: CLINICAL INTERPRETATION OF PHQ2 SCORE: 0

## 2023-02-07 ASSESSMENT — FIBROSIS 4 INDEX: FIB4 SCORE: 1.44

## 2023-02-07 NOTE — PROGRESS NOTES
CC: Hypertension, dyslipidemia, depression, asthma, chronic insomnia    HPI:   Agatha presents today to discuss the following medical issues:    Essential hypertension  Blood pressure has been adequately controlled on hydrochlorothiazide 12.5 mg daily, amlodipine 5 mg daily, telmisartan 40 mg daily.  No side effects     Dyslipidemia  She has been tolerating the statin. Denies muscle pain LFTs has been normal, patient is currently on atorvastatin 10 mg daily.  No history of CAD, stroke, or diabetes     Recurrent major depressive disorder, in partial remission (HCC)  Patient's mood has been stable, denies any suicidal ideation.  She stated that Prozac 20 mg has been so strong for her, wants to decrease the dose to Prozac 10 mg.     Moderate persistent asthma without complication  Patient is currently asymptomatic.  Denies any cough or shortness of breath.  He has been doing fine on albuterol as needed.    Chronic insomnia  Trazodone 50 mg has been working very good for her, needs medication refill.  Patient stated that she has not been taking the trazodone and the fluoxetine at the same time(she takes fluoxetine in the morning and trazodone at night before sleep)    Patient is due for bone density    Patient Active Problem List    Diagnosis Date Noted    Female dyspareunia 07/16/2020    Post-op pain 12/18/2019    Deviated septum 08/14/2019    Recurrent major depressive disorder, in partial remission (HCC) 01/30/2019    Mild intermittent asthma without complication 01/30/2019    Carpal tunnel syndrome on right 11/12/2016    Cervicalgia 09/14/2016    White coat hypertension 04/04/2016    Essential hypertension 04/04/2016    Dyslipidemia 04/04/2016       Current Outpatient Medications   Medication Sig Dispense Refill    telmisartan (MICARDIS) 40 MG Tab Take 1 Tablet by mouth every day. 90 Tablet 3    hydroCHLOROthiazide (HYDRODIURIL) 12.5 MG tablet Take 1 Tablet by mouth every day. 90 Tablet 2    atorvastatin (LIPITOR) 10  "MG Tab Take 1 tablet by mouth once daily 90 Tablet 3    amLODIPine (NORVASC) 5 MG Tab Take 1 Tablet by mouth every day. 90 Tablet 3    fluticasone-salmeterol (ADVAIR) 250-50 MCG/DOSE AEROSOL POWDER, BREATH ACTIVATED Inhale 1 Puff 2 times a day. 60 Each 3    Mometasone Furo-Formoterol Fum (DULERA) 100-5 MCG/ACT Aerosol Inhale 1 Puff 2 times a day. 13 g 2    FLUoxetine (PROZAC) 20 MG Cap Take 1 Capsule by mouth every day. 90 Capsule 2    albuterol 108 (90 Base) MCG/ACT Aero Soln inhalation aerosol Inhale 2 Puffs every four hours as needed for Shortness of Breath. 1 Each 2    estrogens, conjugated (PREMARIN) 0.625 MG/GM Cream 0.5 g vaginally at night for 2 weeks.  Then every other day 30 g 1    traZODone (DESYREL) 50 MG Tab Take 1 Tab by mouth every bedtime. 90 Tab 3    Cranberry 450 MG Cap Take 1 Cap by mouth 2 Times a Day.      diphenhydrAMINE-APAP, sleep,  MG Tab Take 1-2 Tabs by mouth at bedtime as needed.      Calcium Carb-Cholecalciferol (CALCIUM 600 + D PO) Take 1 Tab by mouth 2 Times a Day.      albuterol 108 (90 Base) MCG/ACT Aero Soln inhalation aerosol Inhale 2 Puffs by mouth every 6 hours as needed for Shortness of Breath. 8.5 g 3    ascorbic acid (ASCORBIC ACID) 500 MG Tab Take 500 mg by mouth 2 Times a Day.      azithromycin (ZITHROMAX) 250 MG Tab 500 mg(2 tablets) the 1st day, then 250 mg daily for 4 days (Patient not taking: Reported on 2/7/2023) 6 Tablet 0     No current facility-administered medications for this visit.         Allergies as of 02/07/2023    (No Known Allergies)        ROS: Denies any chest pain, Shortness of breath, Changes bowel or bladder, Lower extremity edema.    Physical Exam:  /72 (BP Location: Right arm, Patient Position: Sitting, BP Cuff Size: Adult)   Pulse 75   Temp 36.2 °C (97.2 °F) (Temporal)   Resp 16   Ht 1.626 m (5' 4\")   Wt 73 kg (161 lb)   LMP  (LMP Unknown)   SpO2 97%   BMI 27.64 kg/m²   Gen.: Well-developed, well-nourished, no apparent " distress,pleasant and cooperative with the examination  Skin:  Warm and dry with good turgor. No rashes or suspicious lesions in visible areas  HEENT:Sinuses nontender with palpation, TMs clear, nares patent with pink mucosa and clear rhinorrhea,no septal deviation ,polyps or lesions. lips without lesions, oropharynx clear.  Neck: Trachea midline,no masses or adenopathy. No JVD.  Cor: Regular rate and rhythm without murmur, gallop or rub.  Lungs: Respirations unlabored.Clear to auscultation with equal breath sounds bilaterally. No wheezes, rhonchi.  Extremities: No cyanosis, clubbing or edema.      Assessment and Plan.   67 y.o. female     1. Essential hypertension  Controlled  Continue on hydrochlorothiazide 12.5 mg daily, amlodipine 5 mg daily, telmisartan 40 mg daily.    - CBC WITH DIFFERENTIAL; Future  - Comp Metabolic Panel; Future  - Lipid Profile; Future  - TSH; Future    2. Dyslipidemia  She has been tolerating the statin. Denies muscle pain LFTs has been normal  Continue on atorvastatin 10 mg daily    - Lipid Profile; Future  - TSH; Future    3. Recurrent major depressive disorder, in partial remission (HCC)  Patient stated that her mood has improved a lot, Prozac 20 mg has been so strong for her, wants to decrease the dose to Prozac 10 mg  A new prescription of Prozac 10 mg sent to the pharmacy    - FLUoxetine (PROZAC) 10 MG Cap; Take 1 Capsule by mouth every day.  Dispense: 90 Capsule; Refill: 2    4. Mild intermittent asthma without complication  Stable.  Continue albuterol as needed    5.Chronic insomnia  She has been doing fine on trazodone, needs refill  Medication refilled.    - traZODone (DESYREL) 50 MG Tab; Take 1 Tablet by mouth at bedtime.  Dispense: 90 Tablet; Refill: 3    6. Asymptomatic menopausal state    - DS-BONE DENSITY STUDY (DEXA); Future

## 2023-03-14 ENCOUNTER — HOSPITAL ENCOUNTER (OUTPATIENT)
Dept: RADIOLOGY | Facility: MEDICAL CENTER | Age: 68
End: 2023-03-14
Attending: FAMILY MEDICINE
Payer: COMMERCIAL

## 2023-03-14 DIAGNOSIS — Z78.0 ASYMPTOMATIC MENOPAUSAL STATE: ICD-10-CM

## 2023-03-14 PROCEDURE — 77080 DXA BONE DENSITY AXIAL: CPT

## 2023-03-20 DIAGNOSIS — I10 ESSENTIAL HYPERTENSION: ICD-10-CM

## 2023-03-20 RX ORDER — HYDROCHLOROTHIAZIDE 12.5 MG/1
TABLET ORAL
Qty: 90 TABLET | Refills: 0 | Status: SHIPPED | OUTPATIENT
Start: 2023-03-20 | End: 2023-07-17 | Stop reason: SDUPTHER

## 2023-07-16 ENCOUNTER — PATIENT MESSAGE (OUTPATIENT)
Dept: MEDICAL GROUP | Facility: MEDICAL CENTER | Age: 68
End: 2023-07-16
Payer: MEDICARE

## 2023-07-16 DIAGNOSIS — E78.5 DYSLIPIDEMIA: ICD-10-CM

## 2023-07-16 DIAGNOSIS — I10 ESSENTIAL HYPERTENSION: ICD-10-CM

## 2023-07-17 RX ORDER — HYDROCHLOROTHIAZIDE 12.5 MG/1
12.5 TABLET ORAL DAILY
Qty: 90 TABLET | Refills: 3 | Status: SHIPPED | OUTPATIENT
Start: 2023-07-17

## 2023-07-17 RX ORDER — ATORVASTATIN CALCIUM 10 MG/1
TABLET, FILM COATED ORAL
Qty: 90 TABLET | Refills: 3 | Status: SHIPPED | OUTPATIENT
Start: 2023-07-17

## 2023-07-17 RX ORDER — TELMISARTAN 40 MG/1
40 TABLET ORAL DAILY
Qty: 90 TABLET | Refills: 3 | Status: SHIPPED | OUTPATIENT
Start: 2023-07-17

## 2023-07-17 RX ORDER — AMLODIPINE BESYLATE 5 MG/1
5 TABLET ORAL DAILY
Qty: 90 TABLET | Refills: 3 | Status: SHIPPED | OUTPATIENT
Start: 2023-07-17

## 2023-08-21 ENCOUNTER — HOSPITAL ENCOUNTER (OUTPATIENT)
Dept: LAB | Facility: MEDICAL CENTER | Age: 68
End: 2023-08-21
Attending: FAMILY MEDICINE
Payer: COMMERCIAL

## 2023-08-21 DIAGNOSIS — E78.5 DYSLIPIDEMIA: ICD-10-CM

## 2023-08-21 DIAGNOSIS — I10 ESSENTIAL HYPERTENSION: ICD-10-CM

## 2023-08-21 LAB
ALBUMIN SERPL BCP-MCNC: 4.4 G/DL (ref 3.2–4.9)
ALBUMIN/GLOB SERPL: 1.8 G/DL
ALP SERPL-CCNC: 103 U/L (ref 30–99)
ALT SERPL-CCNC: 18 U/L (ref 2–50)
ANION GAP SERPL CALC-SCNC: 10 MMOL/L (ref 7–16)
AST SERPL-CCNC: 22 U/L (ref 12–45)
BASOPHILS # BLD AUTO: 0.6 % (ref 0–1.8)
BASOPHILS # BLD: 0.03 K/UL (ref 0–0.12)
BILIRUB SERPL-MCNC: 0.6 MG/DL (ref 0.1–1.5)
BUN SERPL-MCNC: 12 MG/DL (ref 8–22)
CALCIUM ALBUM COR SERPL-MCNC: 9.6 MG/DL (ref 8.5–10.5)
CALCIUM SERPL-MCNC: 9.9 MG/DL (ref 8.5–10.5)
CHLORIDE SERPL-SCNC: 102 MMOL/L (ref 96–112)
CHOLEST SERPL-MCNC: 199 MG/DL (ref 100–199)
CO2 SERPL-SCNC: 26 MMOL/L (ref 20–33)
CREAT SERPL-MCNC: 0.78 MG/DL (ref 0.5–1.4)
EOSINOPHIL # BLD AUTO: 0.03 K/UL (ref 0–0.51)
EOSINOPHIL NFR BLD: 0.6 % (ref 0–6.9)
ERYTHROCYTE [DISTWIDTH] IN BLOOD BY AUTOMATED COUNT: 44.8 FL (ref 35.9–50)
GFR SERPLBLD CREATININE-BSD FMLA CKD-EPI: 83 ML/MIN/1.73 M 2
GLOBULIN SER CALC-MCNC: 2.5 G/DL (ref 1.9–3.5)
GLUCOSE SERPL-MCNC: 95 MG/DL (ref 65–99)
HCT VFR BLD AUTO: 45.3 % (ref 37–47)
HDLC SERPL-MCNC: 91 MG/DL
HGB BLD-MCNC: 15.4 G/DL (ref 12–16)
IMM GRANULOCYTES # BLD AUTO: 0.02 K/UL (ref 0–0.11)
IMM GRANULOCYTES NFR BLD AUTO: 0.4 % (ref 0–0.9)
LDLC SERPL CALC-MCNC: 90 MG/DL
LYMPHOCYTES # BLD AUTO: 1.54 K/UL (ref 1–4.8)
LYMPHOCYTES NFR BLD: 31.9 % (ref 22–41)
MCH RBC QN AUTO: 31.3 PG (ref 27–33)
MCHC RBC AUTO-ENTMCNC: 34 G/DL (ref 32.2–35.5)
MCV RBC AUTO: 92.1 FL (ref 81.4–97.8)
MONOCYTES # BLD AUTO: 0.6 K/UL (ref 0–0.85)
MONOCYTES NFR BLD AUTO: 12.4 % (ref 0–13.4)
NEUTROPHILS # BLD AUTO: 2.61 K/UL (ref 1.82–7.42)
NEUTROPHILS NFR BLD: 54.1 % (ref 44–72)
NRBC # BLD AUTO: 0 K/UL
NRBC BLD-RTO: 0 /100 WBC (ref 0–0.2)
PLATELET # BLD AUTO: 266 K/UL (ref 164–446)
PMV BLD AUTO: 9.4 FL (ref 9–12.9)
POTASSIUM SERPL-SCNC: 4.3 MMOL/L (ref 3.6–5.5)
PROT SERPL-MCNC: 6.9 G/DL (ref 6–8.2)
RBC # BLD AUTO: 4.92 M/UL (ref 4.2–5.4)
SODIUM SERPL-SCNC: 138 MMOL/L (ref 135–145)
TRIGL SERPL-MCNC: 88 MG/DL (ref 0–149)
TSH SERPL DL<=0.005 MIU/L-ACNC: 0.61 UIU/ML (ref 0.38–5.33)
WBC # BLD AUTO: 4.8 K/UL (ref 4.8–10.8)

## 2023-08-21 PROCEDURE — 80053 COMPREHEN METABOLIC PANEL: CPT

## 2023-08-21 PROCEDURE — 85025 COMPLETE CBC W/AUTO DIFF WBC: CPT

## 2023-08-21 PROCEDURE — 80061 LIPID PANEL: CPT

## 2023-08-21 PROCEDURE — 84443 ASSAY THYROID STIM HORMONE: CPT

## 2023-08-21 PROCEDURE — 36415 COLL VENOUS BLD VENIPUNCTURE: CPT

## 2023-08-24 ENCOUNTER — OFFICE VISIT (OUTPATIENT)
Dept: MEDICAL GROUP | Facility: MEDICAL CENTER | Age: 68
End: 2023-08-24
Payer: COMMERCIAL

## 2023-08-24 VITALS
RESPIRATION RATE: 16 BRPM | HEIGHT: 64 IN | OXYGEN SATURATION: 97 % | WEIGHT: 146.8 LBS | TEMPERATURE: 97.9 F | SYSTOLIC BLOOD PRESSURE: 102 MMHG | HEART RATE: 66 BPM | BODY MASS INDEX: 25.06 KG/M2 | DIASTOLIC BLOOD PRESSURE: 52 MMHG

## 2023-08-24 DIAGNOSIS — L98.9 SKIN LESION: ICD-10-CM

## 2023-08-24 DIAGNOSIS — F33.41 RECURRENT MAJOR DEPRESSIVE DISORDER, IN PARTIAL REMISSION (HCC): ICD-10-CM

## 2023-08-24 DIAGNOSIS — I10 ESSENTIAL HYPERTENSION: ICD-10-CM

## 2023-08-24 DIAGNOSIS — E78.5 DYSLIPIDEMIA: ICD-10-CM

## 2023-08-24 DIAGNOSIS — J45.20 MILD INTERMITTENT ASTHMA WITHOUT COMPLICATION: ICD-10-CM

## 2023-08-24 PROCEDURE — 3078F DIAST BP <80 MM HG: CPT | Performed by: FAMILY MEDICINE

## 2023-08-24 PROCEDURE — 3074F SYST BP LT 130 MM HG: CPT | Performed by: FAMILY MEDICINE

## 2023-08-24 PROCEDURE — 99214 OFFICE O/P EST MOD 30 MIN: CPT | Performed by: FAMILY MEDICINE

## 2023-08-24 RX ORDER — ATORVASTATIN CALCIUM 10 MG/1
1 TABLET, FILM COATED ORAL DAILY
COMMUNITY
End: 2023-08-24

## 2023-08-24 ASSESSMENT — FIBROSIS 4 INDEX: FIB4 SCORE: 1.31

## 2023-08-24 NOTE — PROGRESS NOTES
CC: Hypertension, depression, hyperlipidemia, asthma    HPI:   Agatha presents today to discuss the following:    Essential hypertension  Blood pressure has been adequate controlled on telmisartan 40 mg daily, hydrochlorothiazide 12.5 mg daily, and amlodipine 5 mg daily.    Recurrent major depressive disorder, in partial remission (HCC)  Mood has been stable, denies any suicidal ideation.  She has been doing fine on fluoxetine 10 mg daily, said no side effects    Dyslipidemia  She has been tolerating the statin. Denies muscle pain LFTs has been normal, patient has been on atorvastatin 10 mg daily.  No history of diabetes, CAD, or stroke    Mild intermittent asthma without complication  Denies any cough or shortness of breath.  She has been doing fine on albuterol as needed.  Patient stated that approximately has 2 episodes of acute asthma a year during which she uses only the albuterol.    Skin lesion  Patient has been exposed to the sun a lot.  Has some skin lesions, requested referral to dermatologist.    Patient Active Problem List    Diagnosis Date Noted    Female dyspareunia 07/16/2020    Post-op pain 12/18/2019    Deviated septum 08/14/2019    Recurrent major depressive disorder, in partial remission (HCC) 01/30/2019    Mild intermittent asthma without complication 01/30/2019    Carpal tunnel syndrome on right 11/12/2016    Cervicalgia 09/14/2016    White coat hypertension 04/04/2016    Essential hypertension 04/04/2016    Dyslipidemia 04/04/2016       Current Outpatient Medications   Medication Sig Dispense Refill    hydroCHLOROthiazide (HYDRODIURIL) 12.5 MG tablet Take 1 Tablet by mouth every day. 90 Tablet 3    telmisartan (MICARDIS) 40 MG Tab Take 1 Tablet by mouth every day. 90 Tablet 3    atorvastatin (LIPITOR) 10 MG Tab Take 1 tablet by mouth once daily 90 Tablet 3    amLODIPine (NORVASC) 5 MG Tab Take 1 Tablet by mouth every day. 90 Tablet 3    FLUoxetine (PROZAC) 10 MG Cap Take 1 Capsule by mouth every  "day. 90 Capsule 2    traZODone (DESYREL) 50 MG Tab Take 1 Tablet by mouth at bedtime. 90 Tablet 3    Cranberry 450 MG Cap Take 1 Cap by mouth 2 Times a Day.      Calcium Carb-Cholecalciferol (CALCIUM 600 + D PO) Take 1 Tab by mouth 2 Times a Day.      albuterol 108 (90 Base) MCG/ACT Aero Soln inhalation aerosol Inhale 2 Puffs by mouth every 6 hours as needed for Shortness of Breath. 8.5 g 3    ascorbic acid (ASCORBIC ACID) 500 MG Tab Take 500 mg by mouth 2 Times a Day.       No current facility-administered medications for this visit.         Allergies as of 08/24/2023    (No Known Allergies)        ROS: Denies any chest pain, Shortness of breath, Changes bowel or bladder, Lower extremity edema.    Physical Exam:  /52 (BP Location: Right arm, Patient Position: Sitting, BP Cuff Size: Adult)   Pulse 66   Temp 36.6 °C (97.9 °F) (Temporal)   Resp 16   Ht 1.626 m (5' 4\")   Wt 66.6 kg (146 lb 12.8 oz)   LMP  (LMP Unknown)   SpO2 97%   BMI 25.20 kg/m²   Gen.: Well-developed, well-nourished, no apparent distress,pleasant and cooperative with the examination  Skin:  Warm and dry with good turgor. No rashes or suspicious lesions in visible areas  HEENT:Sinuses nontender with palpation, TMs clear, nares patent with pink mucosa and clear rhinorrhea,no septal deviation ,polyps or lesions. lips without lesions, oropharynx clear.  Neck: Trachea midline,no masses or adenopathy. No JVD.  Cor: Regular rate and rhythm without murmur, gallop or rub.  Lungs: Respirations unlabored.Clear to auscultation with equal breath sounds bilaterally. No wheezes, rhonchi.  Extremities: No cyanosis, clubbing or edema.      Assessment and Plan.   68 y.o. female     1. Essential hypertension  Controlled.  Continue on telmisartan 40 mg daily, hydrochlorothiazide 12.5 mg daily, and amlodipine 5 mg daily.    2. Recurrent major depressive disorder, in partial remission (HCC)  Mood has been stable.  Continue on Prozac 10 mg daily    3. " Dyslipidemia  She has been tolerating the statin. Denies muscle pain LFTs has been normal  Continue on atorvastatin 10 mg daily    4. Mild intermittent asthma without complication  Stable.  Asymptomatic  Continue on albuterol as needed(as per patient she has 2 episodes of acute asthma a year)    5. Skin lesion  Has been exposed to the sun a lot.  Requested referral to dermatology.    - Referral to Dermatology

## 2023-08-25 ENCOUNTER — OFFICE VISIT (OUTPATIENT)
Dept: URGENT CARE | Facility: CLINIC | Age: 68
End: 2023-08-25
Payer: COMMERCIAL

## 2023-08-25 VITALS
BODY MASS INDEX: 25.45 KG/M2 | OXYGEN SATURATION: 98 % | WEIGHT: 149.1 LBS | SYSTOLIC BLOOD PRESSURE: 126 MMHG | RESPIRATION RATE: 14 BRPM | TEMPERATURE: 98.5 F | HEART RATE: 74 BPM | HEIGHT: 64 IN | DIASTOLIC BLOOD PRESSURE: 60 MMHG

## 2023-08-25 DIAGNOSIS — J02.9 EXUDATIVE PHARYNGITIS: ICD-10-CM

## 2023-08-25 LAB
FLUAV RNA SPEC QL NAA+PROBE: NEGATIVE
FLUBV RNA SPEC QL NAA+PROBE: NEGATIVE
RSV RNA SPEC QL NAA+PROBE: NEGATIVE
S PYO DNA SPEC NAA+PROBE: NOT DETECTED
SARS-COV-2 RNA RESP QL NAA+PROBE: NEGATIVE

## 2023-08-25 PROCEDURE — 87651 STREP A DNA AMP PROBE: CPT | Performed by: PHYSICIAN ASSISTANT

## 2023-08-25 PROCEDURE — 0241U POCT CEPHEID COV-2, FLU A/B, RSV - PCR: CPT | Performed by: PHYSICIAN ASSISTANT

## 2023-08-25 PROCEDURE — 99213 OFFICE O/P EST LOW 20 MIN: CPT | Performed by: PHYSICIAN ASSISTANT

## 2023-08-25 PROCEDURE — 3074F SYST BP LT 130 MM HG: CPT | Performed by: PHYSICIAN ASSISTANT

## 2023-08-25 PROCEDURE — 3078F DIAST BP <80 MM HG: CPT | Performed by: PHYSICIAN ASSISTANT

## 2023-08-25 RX ORDER — AMOXICILLIN AND CLAVULANATE POTASSIUM 875; 125 MG/1; MG/1
1 TABLET, FILM COATED ORAL 2 TIMES DAILY
Qty: 20 TABLET | Refills: 0 | Status: SHIPPED | OUTPATIENT
Start: 2023-08-25 | End: 2023-09-04

## 2023-08-25 ASSESSMENT — ENCOUNTER SYMPTOMS
VOMITING: 0
SORE THROAT: 1
SHORTNESS OF BREATH: 0
STRIDOR: 0
WHEEZING: 0
DIARRHEA: 0
TROUBLE SWALLOWING: 1
NAUSEA: 0
CHILLS: 0
NECK PAIN: 0
SWOLLEN GLANDS: 1
MYALGIAS: 1
COUGH: 0
FEVER: 0
HEADACHES: 0

## 2023-08-25 ASSESSMENT — FIBROSIS 4 INDEX: FIB4 SCORE: 1.31

## 2023-08-26 NOTE — PROGRESS NOTES
Subjective     Agatha Fritz is a 68 y.o. female who presents with Pharyngitis (Pt has a sore throat, difficulty swallowing x last night )            Pharyngitis   This is a new problem. The current episode started today. The problem has been unchanged. Neither side of throat is experiencing more pain than the other. There has been no fever. The pain is severe. Associated symptoms include swollen glands and trouble swallowing. Pertinent negatives include no congestion, coughing, diarrhea, ear discharge, ear pain, headaches, plugged ear sensation, neck pain, shortness of breath, stridor or vomiting. She has tried nothing for the symptoms.     Patient recently travelled from [place] and came back 6 days ago. No recent antibiotic use. She does not use inhalers.    Past Medical History:   Diagnosis Date    Anesthesia     nitrous oxide in dental work causes panic attacks    Arthritis     osteo, right toe and knees.    Asthma     Bronchitis 2018    Cataract 12--    left, early stages    Depression     Dyslipidemia 4/4/2016    Environmental and seasonal allergies     High cholesterol     Hypertension     Muscle disorder     Psychiatric problem 9/13/2016    depression    RBBB (right bundle branch block)     Snoring     no sleep study         Past Surgical History:   Procedure Laterality Date    NE REPAIR OF NASAL SEPTUM N/A 12/18/2019    Procedure: SEPTOPLASTY, NOSE;  Surgeon: Harika Eldridge M.D.;  Location: SURGERY SAME DAY [facility]S;  Service: Ent    TURBINOPLASTY Bilateral 12/18/2019    Procedure: TURBINOPLASTY- RIGHT MIRELA BULLOSA;  Surgeon: Harika Eldridge M.D.;  Location: SURGERY SAME DAY [facility] ORS;  Service: Ent    COLONOSCOPY  09/2018    CARPAL TUNNEL RELEASE Left 11/11/2017    Procedure: CARPAL TUNNEL RELEASE;  Surgeon: Woody Schaeffer M.D.;  Location: SURGERY [facility];  Service: Neurosurgery    CARPAL TUNNEL RELEASE Right 11/12/2016    Procedure: CARPAL TUNNEL RELEASE;  Surgeon:  "Woody Schaeffer M.D.;  Location: SURGERY [facility] ORS;  Service:     PB INJ CERV/THORAC,W/WO CNTRST  9/14/2016    Procedure: INJ EPI NON NEUROLYTIC C/T - C6-7;  Surgeon: Don Padron M.D.;  Location: SURGERY [facility];  Service: Pain Management    PB FLUORSCOPIC GUIDANCE SPINAL INJECTION  9/14/2016    Procedure: FLUOROGUIDE FOR SPINAL INJ;  Surgeon: Don Padron M.D.;  Location: SURGERY [facility];  Service: Pain Management    BREAST BIOPSY  2002    R breast, needle biopsy-Negative    LYMPH NODE EXCISION Left 2002    Left groin-negative    RECONSTRUCTION, KNEE, ACL, USING ALLOGRAFT Left 1995    MD Keshav Steven.     TONSILLECTOMY  as child         Family History   Problem Relation Age of Onset    Hypertension Son     Hypertension Father     Diabetes Maternal Grandmother          Patient has no known allergies.      Medications, Allergies, and current problem list reviewed today in Epic      Review of Systems   Constitutional:  Positive for malaise/fatigue. Negative for chills and fever.   HENT:  Positive for sore throat and trouble swallowing. Negative for congestion, ear discharge and ear pain.    Respiratory:  Negative for cough, shortness of breath, wheezing and stridor.    Gastrointestinal:  Negative for diarrhea, nausea and vomiting.   Musculoskeletal:  Positive for myalgias. Negative for neck pain.   Neurological:  Negative for headaches.     All other systems reviewed and are negative.            Objective     /60 (BP Location: Right arm, Patient Position: Sitting, BP Cuff Size: Small adult)   Pulse 74   Temp 36.9 °C (98.5 °F) (Temporal)   Resp 14   Ht 1.626 m (5' 4\")   Wt 67.6 kg (149 lb 1.6 oz)   LMP  (LMP Unknown)   SpO2 98%   BMI 25.59 kg/m²      Physical Exam  Constitutional:       General: She is not in acute distress.     Appearance: She is not ill-appearing.   HENT:      Head: Normocephalic and atraumatic.      Nose: Nose normal.      Mouth/Throat:      " Mouth: Mucous membranes are moist.      Pharynx: Pharyngeal swelling, oropharyngeal exudate and posterior oropharyngeal erythema present.   Eyes:      Conjunctiva/sclera: Conjunctivae normal.   Cardiovascular:      Rate and Rhythm: Normal rate and regular rhythm.      Heart sounds: Normal heart sounds.   Pulmonary:      Effort: Pulmonary effort is normal. No respiratory distress.      Breath sounds: No wheezing, rhonchi or rales.   Lymphadenopathy:      Cervical: Cervical adenopathy present.   Skin:     General: Skin is warm and dry.   Neurological:      General: No focal deficit present.      Mental Status: She is alert and oriented to person, place, and time.   Psychiatric:         Mood and Affect: Mood normal.         Behavior: Behavior normal.         Thought Content: Thought content normal.         Judgment: Judgment normal.                           Assessment & Plan        1. Exudative pharyngitis    - POCT CEPHEID GROUP A STREP - PCR- negative   - POCT CEPHEID COV-2, FLU A/B, RSV - PCR- negative      Will treat based on physical exam findings of copious amount of oropharyngeal exudate.  - amoxicillin-clavulanate (AUGMENTIN) 875-125 MG Tab; Take 1 Tablet by mouth 2 times a day for 10 days.  Dispense: 20 Tablet; Refill: 0      Differential diagnoses, Supportive care, and indications for immediate follow-up discussed with patient.   Pathogenesis of diagnosis discussed including typical length and natural progression.   Instructed to return to clinic or nearest emergency department for any change in condition, further concerns, or worsening of symptoms..\    The patient demonstrated a good understanding and agreed with the treatment plan.      Henny Ernandez P.A.-C.

## 2023-10-26 ENCOUNTER — APPOINTMENT (RX ONLY)
Dept: URBAN - METROPOLITAN AREA CLINIC 6 | Facility: CLINIC | Age: 68
Setting detail: DERMATOLOGY
End: 2023-10-26

## 2023-10-26 DIAGNOSIS — L82.1 OTHER SEBORRHEIC KERATOSIS: ICD-10-CM

## 2023-10-26 DIAGNOSIS — Z71.89 OTHER SPECIFIED COUNSELING: ICD-10-CM

## 2023-10-26 DIAGNOSIS — D22 MELANOCYTIC NEVI: ICD-10-CM

## 2023-10-26 DIAGNOSIS — D18.0 HEMANGIOMA: ICD-10-CM

## 2023-10-26 DIAGNOSIS — L81.4 OTHER MELANIN HYPERPIGMENTATION: ICD-10-CM

## 2023-10-26 PROBLEM — D22.62 MELANOCYTIC NEVI OF LEFT UPPER LIMB, INCLUDING SHOULDER: Status: ACTIVE | Noted: 2023-10-26

## 2023-10-26 PROBLEM — D22.5 MELANOCYTIC NEVI OF TRUNK: Status: ACTIVE | Noted: 2023-10-26

## 2023-10-26 PROBLEM — D22.61 MELANOCYTIC NEVI OF RIGHT UPPER LIMB, INCLUDING SHOULDER: Status: ACTIVE | Noted: 2023-10-26

## 2023-10-26 PROBLEM — D22.71 MELANOCYTIC NEVI OF RIGHT LOWER LIMB, INCLUDING HIP: Status: ACTIVE | Noted: 2023-10-26

## 2023-10-26 PROBLEM — D22.72 MELANOCYTIC NEVI OF LEFT LOWER LIMB, INCLUDING HIP: Status: ACTIVE | Noted: 2023-10-26

## 2023-10-26 PROBLEM — D18.01 HEMANGIOMA OF SKIN AND SUBCUTANEOUS TISSUE: Status: ACTIVE | Noted: 2023-10-26

## 2023-10-26 PROCEDURE — 99203 OFFICE O/P NEW LOW 30 MIN: CPT

## 2023-10-26 PROCEDURE — ? COUNSELING

## 2023-10-26 ASSESSMENT — LOCATION DETAILED DESCRIPTION DERM
LOCATION DETAILED: EPIGASTRIC SKIN
LOCATION DETAILED: PERIUMBILICAL SKIN
LOCATION DETAILED: RIGHT KNEE
LOCATION DETAILED: RIGHT ANTERIOR DISTAL THIGH
LOCATION DETAILED: LEFT ANTERIOR DISTAL THIGH
LOCATION DETAILED: RIGHT ANTECUBITAL SKIN
LOCATION DETAILED: RIGHT PROXIMAL PRETIBIAL REGION
LOCATION DETAILED: LEFT ANTERIOR PROXIMAL UPPER ARM
LOCATION DETAILED: RIGHT VENTRAL PROXIMAL FOREARM
LOCATION DETAILED: LEFT VENTRAL PROXIMAL FOREARM
LOCATION DETAILED: LOWER STERNUM
LOCATION DETAILED: LEFT PROXIMAL PRETIBIAL REGION
LOCATION DETAILED: RIGHT ANTERIOR PROXIMAL UPPER ARM
LOCATION DETAILED: LEFT ANTERIOR DISTAL UPPER ARM
LOCATION DETAILED: RIGHT ANTERIOR DISTAL UPPER ARM
LOCATION DETAILED: LEFT KNEE

## 2023-10-26 ASSESSMENT — LOCATION SIMPLE DESCRIPTION DERM
LOCATION SIMPLE: LEFT KNEE
LOCATION SIMPLE: ABDOMEN
LOCATION SIMPLE: CHEST
LOCATION SIMPLE: LEFT THIGH
LOCATION SIMPLE: LEFT FOREARM
LOCATION SIMPLE: RIGHT UPPER ARM
LOCATION SIMPLE: RIGHT PRETIBIAL REGION
LOCATION SIMPLE: RIGHT FOREARM
LOCATION SIMPLE: RIGHT THIGH
LOCATION SIMPLE: RIGHT KNEE
LOCATION SIMPLE: LEFT PRETIBIAL REGION
LOCATION SIMPLE: LEFT UPPER ARM

## 2023-10-26 ASSESSMENT — LOCATION ZONE DERM
LOCATION ZONE: LEG
LOCATION ZONE: TRUNK
LOCATION ZONE: ARM

## 2023-12-13 DIAGNOSIS — J45.20 RAD (REACTIVE AIRWAY DISEASE), MILD INTERMITTENT, UNCOMPLICATED: ICD-10-CM

## 2023-12-13 RX ORDER — ALBUTEROL SULFATE 90 UG/1
2 AEROSOL, METERED RESPIRATORY (INHALATION) EVERY 6 HOURS PRN
Qty: 8.5 G | Refills: 3 | Status: SHIPPED | OUTPATIENT
Start: 2023-12-13

## 2024-01-18 DIAGNOSIS — F33.41 RECURRENT MAJOR DEPRESSIVE DISORDER, IN PARTIAL REMISSION (HCC): ICD-10-CM

## 2024-01-18 RX ORDER — FLUOXETINE 10 MG/1
10 CAPSULE ORAL DAILY
Qty: 90 CAPSULE | Refills: 2 | Status: SHIPPED | OUTPATIENT
Start: 2024-01-18

## 2024-02-22 ENCOUNTER — OFFICE VISIT (OUTPATIENT)
Dept: MEDICAL GROUP | Facility: MEDICAL CENTER | Age: 69
End: 2024-02-22
Payer: MEDICARE

## 2024-02-22 VITALS
DIASTOLIC BLOOD PRESSURE: 60 MMHG | WEIGHT: 157.41 LBS | OXYGEN SATURATION: 99 % | RESPIRATION RATE: 16 BRPM | BODY MASS INDEX: 26.87 KG/M2 | SYSTOLIC BLOOD PRESSURE: 100 MMHG | HEIGHT: 64 IN | HEART RATE: 61 BPM | TEMPERATURE: 97.9 F

## 2024-02-22 DIAGNOSIS — F33.41 RECURRENT MAJOR DEPRESSIVE DISORDER, IN PARTIAL REMISSION (HCC): ICD-10-CM

## 2024-02-22 DIAGNOSIS — M17.10 PRIMARY OSTEOARTHRITIS OF KNEE, UNSPECIFIED LATERALITY: ICD-10-CM

## 2024-02-22 DIAGNOSIS — E78.5 DYSLIPIDEMIA: ICD-10-CM

## 2024-02-22 DIAGNOSIS — I10 ESSENTIAL HYPERTENSION: ICD-10-CM

## 2024-02-22 PROCEDURE — 3078F DIAST BP <80 MM HG: CPT | Performed by: FAMILY MEDICINE

## 2024-02-22 PROCEDURE — 99214 OFFICE O/P EST MOD 30 MIN: CPT | Performed by: FAMILY MEDICINE

## 2024-02-22 PROCEDURE — 3074F SYST BP LT 130 MM HG: CPT | Performed by: FAMILY MEDICINE

## 2024-02-22 RX ORDER — CONJUGATED ESTROGENS 0.62 MG/G
CREAM VAGINAL
COMMUNITY
End: 2024-02-22

## 2024-02-22 RX ORDER — TELMISARTAN 20 MG/1
TABLET ORAL
COMMUNITY
End: 2024-02-22

## 2024-02-22 RX ORDER — ONDANSETRON 4 MG/1
TABLET, ORALLY DISINTEGRATING ORAL
COMMUNITY
End: 2024-02-22

## 2024-02-22 RX ORDER — FLUTICASONE PROPIONATE 50 MCG
SPRAY, SUSPENSION (ML) NASAL
COMMUNITY
End: 2024-02-22

## 2024-02-22 RX ORDER — ASPIRIN 81 MG/1
TABLET ORAL
COMMUNITY

## 2024-02-22 RX ORDER — PREDNISONE 20 MG/1
TABLET ORAL
COMMUNITY
End: 2024-02-22

## 2024-02-22 RX ORDER — GABAPENTIN 300 MG/1
1 CAPSULE ORAL 3 TIMES DAILY
COMMUNITY
End: 2024-02-22

## 2024-02-22 RX ORDER — TRIAMCINOLONE ACETONIDE 5 MG/G
OINTMENT TOPICAL
COMMUNITY
End: 2024-02-22

## 2024-02-22 ASSESSMENT — PATIENT HEALTH QUESTIONNAIRE - PHQ9
SUM OF ALL RESPONSES TO PHQ9 QUESTIONS 1 AND 2: 0
7. TROUBLE CONCENTRATING ON THINGS, SUCH AS READING THE NEWSPAPER OR WATCHING TELEVISION: NOT AT ALL
3. TROUBLE FALLING OR STAYING ASLEEP OR SLEEPING TOO MUCH: NOT AT ALL
4. FEELING TIRED OR HAVING LITTLE ENERGY: NOT AT ALL
SUM OF ALL RESPONSES TO PHQ QUESTIONS 1-9: 0
8. MOVING OR SPEAKING SO SLOWLY THAT OTHER PEOPLE COULD HAVE NOTICED. OR THE OPPOSITE, BEING SO FIGETY OR RESTLESS THAT YOU HAVE BEEN MOVING AROUND A LOT MORE THAN USUAL: NOT AT ALL
6. FEELING BAD ABOUT YOURSELF - OR THAT YOU ARE A FAILURE OR HAVE LET YOURSELF OR YOUR FAMILY DOWN: NOT AL ALL
2. FEELING DOWN, DEPRESSED, IRRITABLE, OR HOPELESS: NOT AT ALL
1. LITTLE INTEREST OR PLEASURE IN DOING THINGS: NOT AT ALL
9. THOUGHTS THAT YOU WOULD BE BETTER OFF DEAD, OR OF HURTING YOURSELF: NOT AT ALL
5. POOR APPETITE OR OVEREATING: NOT AT ALL

## 2024-02-22 ASSESSMENT — FIBROSIS 4 INDEX: FIB4 SCORE: 1.31

## 2024-02-22 NOTE — PROGRESS NOTES
CC: Hypertension, hyperlipidemia, depression, osteoarthritis of the knee joint    HPI:   Agatha presents today     Essential hypertension  Blood pressure has been adequate controlled on hydrochlorothiazide 12.5 mg daily, telmisartan 40 mg daily, and amlodipine 5 mg daily.  No side effects    Dyslipidemia  Patient has been tolerating the statin. Denies muscle pain LFTs has been normal, currently on atorvastatin 10 mg daily.    Recurrent major depressive disorder, in partial remission (HCC)  Patient's mood has been fluctuating but mostly stable.  Denies any suicidal ideation.  She has been on fluoxetine 10 mg daily, no side effects    Osteoarthritis of the knee joint  Patient is scheduled for knee replacement.  She will do the EKG at the preadmission appointment at the hospital.  Advised to stop taking aspirin 7 to 10 days before the surgery.  However patient clinically stable, no heart or lung disease.  Blood pressure has been adequately controlled as mentioned above.  She is cleared for surgery    Patient Active Problem List    Diagnosis Date Noted    Female dyspareunia 07/16/2020    Post-op pain 12/18/2019    Deviated septum 08/14/2019    Recurrent major depressive disorder, in partial remission (HCC) 01/30/2019    Mild intermittent asthma without complication 01/30/2019    Carpal tunnel syndrome on right 11/12/2016    Cervicalgia 09/14/2016    White coat hypertension 04/04/2016    Essential hypertension 04/04/2016    Dyslipidemia 04/04/2016       Current Outpatient Medications   Medication Sig Dispense Refill    aspirin 81 MG EC tablet TAKE 1 TABLET (81 MG) BY ORAL ROUTE ONCE DAILY**(lAST DOSE 10 DAYS PRIOR)**      FLUoxetine (PROZAC) 10 MG Cap Take 1 Capsule by mouth every day. 90 Capsule 2    albuterol 108 (90 Base) MCG/ACT Aero Soln inhalation aerosol Inhale 2 Puffs every 6 hours as needed for Shortness of Breath. 8.5 g 3    hydroCHLOROthiazide (HYDRODIURIL) 12.5 MG tablet Take 1 Tablet by mouth every day. 90  "Tablet 3    telmisartan (MICARDIS) 40 MG Tab Take 1 Tablet by mouth every day. 90 Tablet 3    atorvastatin (LIPITOR) 10 MG Tab Take 1 tablet by mouth once daily 90 Tablet 3    amLODIPine (NORVASC) 5 MG Tab Take 1 Tablet by mouth every day. 90 Tablet 3    traZODone (DESYREL) 50 MG Tab Take 1 Tablet by mouth at bedtime. 90 Tablet 3     No current facility-administered medications for this visit.         Allergies as of 02/22/2024    (No Known Allergies)        ROS: Denies any chest pain, Shortness of breath, Changes bowel or bladder, Lower extremity edema.    Physical Exam:  /60 (BP Location: Right arm, Patient Position: Sitting, BP Cuff Size: Adult)   Pulse 61   Temp 36.6 °C (97.9 °F) (Temporal)   Resp 16   Ht 1.626 m (5' 4\")   Wt 71.4 kg (157 lb 6.5 oz)   LMP  (LMP Unknown)   SpO2 99%   BMI 27.02 kg/m²   Gen.: Well-developed, well-nourished, no apparent distress,pleasant and cooperative with the examination  Skin:  Warm and dry with good turgor. No rashes or suspicious lesions in visible areas  HEENT:Sinuses nontender with palpation, TMs clear, nares patent with pink mucosa and clear rhinorrhea,no septal deviation ,polyps or lesions. lips without lesions, oropharynx clear.  Neck: Trachea midline,no masses or adenopathy. No JVD.  Cor: Regular rate and rhythm without murmur, gallop or rub.  Lungs: Respirations unlabored.Clear to auscultation with equal breath sounds bilaterally. No wheezes, rhonchi.  Extremities: No cyanosis, clubbing or edema.  Both knee joints show decreased range of motion but no swelling or tenderness      Assessment and Plan.   68 y.o. female     1. Essential hypertension  Controlled.  Continue on hydrochlorothiazide 12.5 mg daily, telmisartan 40 mg daily, and amlodipine 5 mg daily.    - CBC WITH DIFFERENTIAL; Future  - Comp Metabolic Panel; Future    2. Dyslipidemia  Continue onHe has been tolerating the statin. Denies muscle pain LFTs has been normal  Continue on atorvastatin 10 " mg daily.    3. Recurrent major depressive disorder, in partial remission (HCC)  Mood has been fluctuating but mostly stable.  Continue on fluoxetine 10 mg daily    4.  Osteoarthritis of the knee joint  Scheduled for knee replacement.  Patient will do the EKG at the preadmission appointment.  Advised to stop taking aspirin 7 to 10 days before the surgery.

## 2024-07-17 DIAGNOSIS — J45.20 RAD (REACTIVE AIRWAY DISEASE), MILD INTERMITTENT, UNCOMPLICATED: ICD-10-CM

## 2024-07-17 RX ORDER — ALBUTEROL SULFATE 90 UG/1
2 AEROSOL, METERED RESPIRATORY (INHALATION) EVERY 6 HOURS PRN
Qty: 9 G | Refills: 0 | Status: SHIPPED | OUTPATIENT
Start: 2024-07-17

## 2024-08-04 DIAGNOSIS — F51.04 CHRONIC INSOMNIA: ICD-10-CM

## 2024-08-04 DIAGNOSIS — I10 ESSENTIAL HYPERTENSION: ICD-10-CM

## 2024-08-04 DIAGNOSIS — E78.5 DYSLIPIDEMIA: ICD-10-CM

## 2024-08-08 RX ORDER — TELMISARTAN 40 MG/1
40 TABLET ORAL DAILY
Qty: 90 TABLET | Refills: 3 | Status: SHIPPED | OUTPATIENT
Start: 2024-08-08

## 2024-08-08 RX ORDER — AMLODIPINE BESYLATE 5 MG/1
5 TABLET ORAL DAILY
Qty: 90 TABLET | Refills: 3 | Status: SHIPPED | OUTPATIENT
Start: 2024-08-08

## 2024-08-08 RX ORDER — TRAZODONE HYDROCHLORIDE 50 MG/1
50 TABLET, FILM COATED ORAL
Qty: 90 TABLET | Refills: 3 | Status: SHIPPED | OUTPATIENT
Start: 2024-08-08

## 2024-08-08 RX ORDER — ATORVASTATIN CALCIUM 10 MG/1
TABLET, FILM COATED ORAL
Qty: 90 TABLET | Refills: 3 | Status: SHIPPED | OUTPATIENT
Start: 2024-08-08

## 2024-08-08 RX ORDER — HYDROCHLOROTHIAZIDE 12.5 MG/1
12.5 TABLET ORAL DAILY
Qty: 90 TABLET | Refills: 3 | Status: SHIPPED | OUTPATIENT
Start: 2024-08-08

## 2024-09-11 ENCOUNTER — PATIENT MESSAGE (OUTPATIENT)
Dept: MEDICAL GROUP | Facility: MEDICAL CENTER | Age: 69
End: 2024-09-11
Payer: MEDICARE

## 2024-09-11 DIAGNOSIS — Z12.31 ENCOUNTER FOR SCREENING MAMMOGRAM FOR MALIGNANT NEOPLASM OF BREAST: ICD-10-CM

## 2024-09-11 DIAGNOSIS — N63.20 MASS OF LEFT BREAST, UNSPECIFIED QUADRANT: ICD-10-CM

## 2024-09-18 ENCOUNTER — HOSPITAL ENCOUNTER (OUTPATIENT)
Dept: RADIOLOGY | Facility: MEDICAL CENTER | Age: 69
End: 2024-09-18
Attending: FAMILY MEDICINE
Payer: MEDICARE

## 2024-09-18 DIAGNOSIS — N63.14 MASS OF LOWER INNER QUADRANT OF RIGHT BREAST: ICD-10-CM

## 2024-09-18 PROCEDURE — 76642 ULTRASOUND BREAST LIMITED: CPT | Mod: RT

## 2024-09-18 PROCEDURE — G0279 TOMOSYNTHESIS, MAMMO: HCPCS

## 2024-09-19 ENCOUNTER — OFFICE VISIT (OUTPATIENT)
Dept: MEDICAL GROUP | Facility: MEDICAL CENTER | Age: 69
End: 2024-09-19
Payer: MEDICARE

## 2024-09-19 VITALS
BODY MASS INDEX: 26.69 KG/M2 | RESPIRATION RATE: 18 BRPM | SYSTOLIC BLOOD PRESSURE: 102 MMHG | HEART RATE: 65 BPM | WEIGHT: 156.31 LBS | DIASTOLIC BLOOD PRESSURE: 70 MMHG | HEIGHT: 64 IN | TEMPERATURE: 98.6 F | OXYGEN SATURATION: 99 %

## 2024-09-19 DIAGNOSIS — I10 ESSENTIAL HYPERTENSION: ICD-10-CM

## 2024-09-19 DIAGNOSIS — E78.5 DYSLIPIDEMIA: ICD-10-CM

## 2024-09-19 DIAGNOSIS — F51.04 CHRONIC INSOMNIA: ICD-10-CM

## 2024-09-19 DIAGNOSIS — J45.20 MILD INTERMITTENT ASTHMA WITHOUT COMPLICATION: ICD-10-CM

## 2024-09-19 DIAGNOSIS — F33.41 RECURRENT MAJOR DEPRESSIVE DISORDER, IN PARTIAL REMISSION (HCC): ICD-10-CM

## 2024-09-19 PROCEDURE — 3074F SYST BP LT 130 MM HG: CPT | Performed by: FAMILY MEDICINE

## 2024-09-19 PROCEDURE — 99214 OFFICE O/P EST MOD 30 MIN: CPT | Performed by: FAMILY MEDICINE

## 2024-09-19 PROCEDURE — 3078F DIAST BP <80 MM HG: CPT | Performed by: FAMILY MEDICINE

## 2024-09-19 ASSESSMENT — FIBROSIS 4 INDEX: FIB4 SCORE: 1.33

## 2024-09-19 NOTE — PROGRESS NOTES
Verbal consent was acquired by the patient to use [employer] ambient listening note generation during this visit Yes       CC: Hypertension, hyperlipidemia, asthma, depression, insomnia  History of Present Illness  The patient presents for a follow-up visit.    She is currently on hydrochlorothiazide 12.5 mg daily, telmisartan 40 mg daily, and amlodipine 5 mg daily for blood pressure management. Her blood pressure is well-controlled.    For cholesterol control, she takes atorvastatin 10 mg daily.    Mood has been stable.  Her depression is managed with fluoxetine 10 mg daily. She uses trazodone 50 mg as needed for sleep.    She uses albuterol as needed for asthma and last used it in June or July 2024.    She underwent left breast mammogram, and an ultrasound yesterday, which showed normal calcification.    Her EKG shows unchanged right bundle branch block and sinus bradycardia.    She had a total knee replacement in April 2024.          Patient Active Problem List    Diagnosis Date Noted    Female dyspareunia 07/16/2020    Post-op pain 12/18/2019    Deviated septum 08/14/2019    Recurrent major depressive disorder, in partial remission (HCC) 01/30/2019    Mild intermittent asthma without complication 01/30/2019    Carpal tunnel syndrome on right 11/12/2016    Cervicalgia 09/14/2016    White coat hypertension 04/04/2016    Essential hypertension 04/04/2016    Dyslipidemia 04/04/2016       Current Outpatient Medications   Medication Sig Dispense Refill    traZODone (DESYREL) 50 MG Tab Take 1 Tablet by mouth at bedtime. 90 Tablet 3    hydroCHLOROthiazide 12.5 MG tablet Take 1 Tablet by mouth every day. 90 Tablet 3    telmisartan (MICARDIS) 40 MG Tab Take 1 Tablet by mouth every day. 90 Tablet 3    atorvastatin (LIPITOR) 10 MG Tab Take 1 tablet by mouth once daily 90 Tablet 3    amLODIPine (NORVASC) 5 MG Tab Take 1 Tablet by mouth every day. 90 Tablet 3    albuterol 108 (90 Base) MCG/ACT Aero Soln inhalation aerosol  "INHALE 2 PUFFS BY MOUTH EVERY 6 HOURS AS NEEDED FOR SHORTNESS OF BREATH 9 g 0    FLUoxetine (PROZAC) 10 MG Cap Take 1 Capsule by mouth every day. 90 Capsule 2    aspirin 81 MG EC tablet TAKE 1 TABLET (81 MG) BY ORAL ROUTE ONCE DAILY**(lAST DOSE 10 DAYS PRIOR)**       No current facility-administered medications for this visit.         Allergies as of 09/19/2024    (No Known Allergies)        ROS: Denies any chest pain, Shortness of breath, Changes bowel or bladder, Lower extremity edema.    Physical Exam:  /70   Pulse 65   Temp 37 °C (98.6 °F)   Resp 18   Ht 1.626 m (5' 4\")   Wt 70.9 kg (156 lb 4.9 oz)   LMP  (LMP Unknown)   SpO2 99%   BMI 26.83 kg/m²   Gen.: Well-developed, well-nourished, no apparent distress,pleasant and cooperative with the examination  Skin:  Warm and dry with good turgor. No rashes or suspicious lesions in visible areas  HEENT:Sinuses nontender with palpation, TMs clear, nares patent with pink mucosa and clear rhinorrhea,no septal deviation ,polyps or lesions. lips without lesions, oropharynx clear.  Neck: Trachea midline,no masses or adenopathy. No JVD.  Cor: Regular rate and rhythm without murmur, gallop or rub.  Lungs: Respirations unlabored.Clear to auscultation with equal breath sounds bilaterally. No wheezes, rhonchi.  Extremities: No cyanosis, clubbing or edema.          Assessment and Plan.   69 y.o. female     1. Essential hypertension  Controlled.  Continue on hydrochlorothiazide, telmisartan, and amlodipine as mentioned above.  - TSH; Future    2. Dyslipidemia  She has been tolerating the statin. Denies muscle pain LFTs has been normal  Continue on atorvastatin 10 mg daily.  - Lipid Profile; Future  - TSH; Future    3. Recurrent major depressive disorder, in partial remission (HCC)  Stable.  Denies any suicidal ideation.  Continue fluoxetine 10 mg daily    4. Chronic insomnia  She has been doing fine on trazodone 50 mg as needed    5. Mild intermittent asthma without " complication  Stable.  Asymptomatic  Continue on albuterol as needed

## 2024-10-01 DIAGNOSIS — F33.41 RECURRENT MAJOR DEPRESSIVE DISORDER, IN PARTIAL REMISSION (HCC): ICD-10-CM

## 2024-10-01 RX ORDER — FLUOXETINE 10 MG/1
10 CAPSULE ORAL DAILY
Qty: 90 CAPSULE | Refills: 2 | Status: SHIPPED | OUTPATIENT
Start: 2024-10-01

## 2024-10-15 ENCOUNTER — HOSPITAL ENCOUNTER (OUTPATIENT)
Dept: LAB | Facility: MEDICAL CENTER | Age: 69
End: 2024-10-15
Attending: FAMILY MEDICINE
Payer: MEDICARE

## 2024-10-15 DIAGNOSIS — I10 ESSENTIAL HYPERTENSION: ICD-10-CM

## 2024-10-15 DIAGNOSIS — E78.5 DYSLIPIDEMIA: ICD-10-CM

## 2024-10-15 LAB
ALBUMIN SERPL BCP-MCNC: 4.2 G/DL (ref 3.2–4.9)
ALBUMIN/GLOB SERPL: 1.6 G/DL
ALP SERPL-CCNC: 108 U/L (ref 30–99)
ALT SERPL-CCNC: 14 U/L (ref 2–50)
ANION GAP SERPL CALC-SCNC: 13 MMOL/L (ref 7–16)
AST SERPL-CCNC: 29 U/L (ref 12–45)
BASOPHILS # BLD AUTO: 0.9 % (ref 0–1.8)
BASOPHILS # BLD: 0.04 K/UL (ref 0–0.12)
BILIRUB SERPL-MCNC: 0.7 MG/DL (ref 0.1–1.5)
BUN SERPL-MCNC: 15 MG/DL (ref 8–22)
CALCIUM ALBUM COR SERPL-MCNC: 10.5 MG/DL (ref 8.5–10.5)
CALCIUM SERPL-MCNC: 10.7 MG/DL (ref 8.5–10.5)
CHLORIDE SERPL-SCNC: 105 MMOL/L (ref 96–112)
CHOLEST SERPL-MCNC: 187 MG/DL (ref 100–199)
CO2 SERPL-SCNC: 23 MMOL/L (ref 20–33)
CREAT SERPL-MCNC: 0.62 MG/DL (ref 0.5–1.4)
EOSINOPHIL # BLD AUTO: 0.15 K/UL (ref 0–0.51)
EOSINOPHIL NFR BLD: 3.4 % (ref 0–6.9)
ERYTHROCYTE [DISTWIDTH] IN BLOOD BY AUTOMATED COUNT: 44.7 FL (ref 35.9–50)
GFR SERPLBLD CREATININE-BSD FMLA CKD-EPI: 97 ML/MIN/1.73 M 2
GLOBULIN SER CALC-MCNC: 2.6 G/DL (ref 1.9–3.5)
GLUCOSE SERPL-MCNC: 96 MG/DL (ref 65–99)
HCT VFR BLD AUTO: 43.3 % (ref 37–47)
HDLC SERPL-MCNC: 85 MG/DL
HGB BLD-MCNC: 14.7 G/DL (ref 12–16)
IMM GRANULOCYTES # BLD AUTO: 0.02 K/UL (ref 0–0.11)
IMM GRANULOCYTES NFR BLD AUTO: 0.4 % (ref 0–0.9)
LDLC SERPL CALC-MCNC: 82 MG/DL
LYMPHOCYTES # BLD AUTO: 1.63 K/UL (ref 1–4.8)
LYMPHOCYTES NFR BLD: 36.5 % (ref 22–41)
MCH RBC QN AUTO: 31.5 PG (ref 27–33)
MCHC RBC AUTO-ENTMCNC: 33.9 G/DL (ref 32.2–35.5)
MCV RBC AUTO: 92.9 FL (ref 81.4–97.8)
MONOCYTES # BLD AUTO: 0.54 K/UL (ref 0–0.85)
MONOCYTES NFR BLD AUTO: 12.1 % (ref 0–13.4)
NEUTROPHILS # BLD AUTO: 2.09 K/UL (ref 1.82–7.42)
NEUTROPHILS NFR BLD: 46.7 % (ref 44–72)
NRBC # BLD AUTO: 0 K/UL
NRBC BLD-RTO: 0 /100 WBC (ref 0–0.2)
PLATELET # BLD AUTO: 252 K/UL (ref 164–446)
PMV BLD AUTO: 9.7 FL (ref 9–12.9)
POTASSIUM SERPL-SCNC: 4.3 MMOL/L (ref 3.6–5.5)
PROT SERPL-MCNC: 6.8 G/DL (ref 6–8.2)
RBC # BLD AUTO: 4.66 M/UL (ref 4.2–5.4)
SODIUM SERPL-SCNC: 141 MMOL/L (ref 135–145)
TRIGL SERPL-MCNC: 99 MG/DL (ref 0–149)
TSH SERPL-ACNC: 1.07 UIU/ML (ref 0.35–5.5)
WBC # BLD AUTO: 4.5 K/UL (ref 4.8–10.8)

## 2024-10-15 PROCEDURE — 80061 LIPID PANEL: CPT

## 2024-10-15 PROCEDURE — 80053 COMPREHEN METABOLIC PANEL: CPT

## 2024-10-15 PROCEDURE — 85025 COMPLETE CBC W/AUTO DIFF WBC: CPT

## 2024-10-15 PROCEDURE — 84443 ASSAY THYROID STIM HORMONE: CPT

## 2024-10-15 PROCEDURE — 36415 COLL VENOUS BLD VENIPUNCTURE: CPT

## 2024-10-29 ENCOUNTER — APPOINTMENT (RX ONLY)
Dept: URBAN - METROPOLITAN AREA CLINIC 6 | Facility: CLINIC | Age: 69
Setting detail: DERMATOLOGY
End: 2024-10-29

## 2024-10-29 DIAGNOSIS — D22 MELANOCYTIC NEVI: ICD-10-CM

## 2024-10-29 DIAGNOSIS — L81.4 OTHER MELANIN HYPERPIGMENTATION: ICD-10-CM

## 2024-10-29 DIAGNOSIS — I78.8 OTHER DISEASES OF CAPILLARIES: ICD-10-CM

## 2024-10-29 DIAGNOSIS — D69.2 OTHER NONTHROMBOCYTOPENIC PURPURA: ICD-10-CM

## 2024-10-29 DIAGNOSIS — D18.0 HEMANGIOMA: ICD-10-CM

## 2024-10-29 DIAGNOSIS — L57.0 ACTINIC KERATOSIS: ICD-10-CM

## 2024-10-29 DIAGNOSIS — L82.1 OTHER SEBORRHEIC KERATOSIS: ICD-10-CM

## 2024-10-29 DIAGNOSIS — Z71.89 OTHER SPECIFIED COUNSELING: ICD-10-CM

## 2024-10-29 PROBLEM — D22.71 MELANOCYTIC NEVI OF RIGHT LOWER LIMB, INCLUDING HIP: Status: ACTIVE | Noted: 2024-10-29

## 2024-10-29 PROBLEM — D18.01 HEMANGIOMA OF SKIN AND SUBCUTANEOUS TISSUE: Status: ACTIVE | Noted: 2024-10-29

## 2024-10-29 PROBLEM — D22.5 MELANOCYTIC NEVI OF TRUNK: Status: ACTIVE | Noted: 2024-10-29

## 2024-10-29 PROBLEM — D22.61 MELANOCYTIC NEVI OF RIGHT UPPER LIMB, INCLUDING SHOULDER: Status: ACTIVE | Noted: 2024-10-29

## 2024-10-29 PROBLEM — D22.62 MELANOCYTIC NEVI OF LEFT UPPER LIMB, INCLUDING SHOULDER: Status: ACTIVE | Noted: 2024-10-29

## 2024-10-29 PROBLEM — D22.72 MELANOCYTIC NEVI OF LEFT LOWER LIMB, INCLUDING HIP: Status: ACTIVE | Noted: 2024-10-29

## 2024-10-29 PROCEDURE — ? ELECTRODESICCATION

## 2024-10-29 PROCEDURE — 17000 DESTRUCT PREMALG LESION: CPT | Mod: 59

## 2024-10-29 PROCEDURE — ? LIQUID NITROGEN

## 2024-10-29 PROCEDURE — ? DIAGNOSIS COMMENT

## 2024-10-29 PROCEDURE — 99213 OFFICE O/P EST LOW 20 MIN: CPT | Mod: 25

## 2024-10-29 PROCEDURE — ? COUNSELING

## 2024-10-29 PROCEDURE — 17110 DESTRUCTION B9 LES UP TO 14: CPT

## 2024-10-29 ASSESSMENT — LOCATION DETAILED DESCRIPTION DERM
LOCATION DETAILED: LEFT ANTERIOR DISTAL THIGH
LOCATION DETAILED: PERIUMBILICAL SKIN
LOCATION DETAILED: RIGHT VENTRAL PROXIMAL FOREARM
LOCATION DETAILED: RIGHT KNEE
LOCATION DETAILED: RIGHT PROXIMAL PRETIBIAL REGION
LOCATION DETAILED: RIGHT ANTERIOR DISTAL UPPER ARM
LOCATION DETAILED: LEFT ANTERIOR DISTAL UPPER ARM
LOCATION DETAILED: LEFT KNEE
LOCATION DETAILED: LEFT INFERIOR VERMILION LIP
LOCATION DETAILED: RIGHT ANTERIOR PROXIMAL UPPER ARM
LOCATION DETAILED: LEFT ANTERIOR PROXIMAL UPPER ARM
LOCATION DETAILED: LEFT DISTAL DORSAL FOREARM
LOCATION DETAILED: LOWER STERNUM
LOCATION DETAILED: RIGHT SUPERIOR CENTRAL BUCCAL CHEEK
LOCATION DETAILED: RIGHT DISTAL DORSAL FOREARM
LOCATION DETAILED: LEFT VENTRAL PROXIMAL FOREARM
LOCATION DETAILED: EPIGASTRIC SKIN
LOCATION DETAILED: RIGHT ANTECUBITAL SKIN
LOCATION DETAILED: RIGHT ANTERIOR DISTAL THIGH
LOCATION DETAILED: LEFT PROXIMAL PRETIBIAL REGION

## 2024-10-29 ASSESSMENT — LOCATION SIMPLE DESCRIPTION DERM
LOCATION SIMPLE: RIGHT UPPER ARM
LOCATION SIMPLE: CHEST
LOCATION SIMPLE: RIGHT CHEEK
LOCATION SIMPLE: RIGHT THIGH
LOCATION SIMPLE: LEFT FOREARM
LOCATION SIMPLE: RIGHT KNEE
LOCATION SIMPLE: LEFT LIP
LOCATION SIMPLE: LEFT PRETIBIAL REGION
LOCATION SIMPLE: LEFT KNEE
LOCATION SIMPLE: RIGHT FOREARM
LOCATION SIMPLE: ABDOMEN
LOCATION SIMPLE: LEFT THIGH
LOCATION SIMPLE: RIGHT PRETIBIAL REGION
LOCATION SIMPLE: LEFT UPPER ARM

## 2024-10-29 ASSESSMENT — LOCATION ZONE DERM
LOCATION ZONE: LEG
LOCATION ZONE: LIP
LOCATION ZONE: TRUNK
LOCATION ZONE: ARM
LOCATION ZONE: FACE

## 2024-10-29 NOTE — PROCEDURE: ELECTRODESICCATION
Detail Level: Simple
Post-Care Instructions: I reviewed with the patient in detail post-care instructions. Patient is to wear sunprotection, and avoid picking at any of the treated lesions. Pt may apply Vaseline to crusted or scabbing areas
Medical Necessity Information: It is in your best interest to select a reason for this procedure from the list below. All of these items fulfill various CMS LCD requirements except the new and changing color options.
Medical Necessity Clause: This procedure was medically necessary because the lesions that were treated were:
Consent: The patient's consent was obtained including but not limited to risks of crusting, scabbing, blistering, scarring, darker or lighter pigmentary change, recurrence, incomplete removal and infection.
Include Z78.9 (Other Specified Conditions Influencing Health Status) As An Associated Diagnosis?: No

## 2024-10-29 NOTE — PROCEDURE: LIQUID NITROGEN
Show Aperture Variable?: Yes
Consent: The patient's consent was obtained including but not limited to risks of crusting, scabbing, blistering, scarring, darker or lighter pigmentary change, recurrence, incomplete removal and infection.
Render Post-Care Instructions In Note?: no
Post-Care Instructions: I reviewed with the patient in detail post-care instructions. Patient is to wear sunprotection, and avoid picking at any of the treated lesions. Pt may apply Vaseline to crusted or scabbing areas.
Number Of Freeze-Thaw Cycles: 3 freeze-thaw cycles
Detail Level: Detailed
Application Tool (Optional): Liquid Nitrogen Sprayer
Duration Of Freeze Thaw-Cycle (Seconds): 10

## 2024-10-29 NOTE — PROCEDURE: DIAGNOSIS COMMENT
Comment: Had laser in past, resolved then returned after about 5 years.
Detail Level: Simple
Render Risk Assessment In Note?: no

## 2025-02-07 ENCOUNTER — APPOINTMENT (OUTPATIENT)
Dept: RADIOLOGY | Facility: IMAGING CENTER | Age: 70
End: 2025-02-07
Attending: FAMILY MEDICINE
Payer: MEDICARE

## 2025-02-07 ENCOUNTER — OFFICE VISIT (OUTPATIENT)
Dept: URGENT CARE | Facility: CLINIC | Age: 70
End: 2025-02-07
Payer: MEDICARE

## 2025-02-07 VITALS
HEART RATE: 84 BPM | SYSTOLIC BLOOD PRESSURE: 104 MMHG | BODY MASS INDEX: 27.11 KG/M2 | DIASTOLIC BLOOD PRESSURE: 48 MMHG | OXYGEN SATURATION: 97 % | WEIGHT: 158.8 LBS | HEIGHT: 64 IN | RESPIRATION RATE: 12 BRPM | TEMPERATURE: 97.7 F

## 2025-02-07 DIAGNOSIS — R50.9 FEVER, UNSPECIFIED FEVER CAUSE: ICD-10-CM

## 2025-02-07 DIAGNOSIS — R05.1 ACUTE COUGH: ICD-10-CM

## 2025-02-07 DIAGNOSIS — J32.9 RHINOSINUSITIS: ICD-10-CM

## 2025-02-07 DIAGNOSIS — J45.21 MILD INTERMITTENT ASTHMA WITH EXACERBATION: ICD-10-CM

## 2025-02-07 PROCEDURE — 99214 OFFICE O/P EST MOD 30 MIN: CPT | Performed by: FAMILY MEDICINE

## 2025-02-07 PROCEDURE — 3078F DIAST BP <80 MM HG: CPT | Performed by: FAMILY MEDICINE

## 2025-02-07 PROCEDURE — 3074F SYST BP LT 130 MM HG: CPT | Performed by: FAMILY MEDICINE

## 2025-02-07 PROCEDURE — 71046 X-RAY EXAM CHEST 2 VIEWS: CPT | Mod: TC | Performed by: FAMILY MEDICINE

## 2025-02-07 RX ORDER — METHYLPREDNISOLONE 4 MG/1
TABLET ORAL
Qty: 21 TABLET | Refills: 0 | Status: SHIPPED | OUTPATIENT
Start: 2025-02-07

## 2025-02-07 RX ORDER — DEXTROMETHORPHAN HYDROBROMIDE AND PROMETHAZINE HYDROCHLORIDE 15; 6.25 MG/5ML; MG/5ML
5 SYRUP ORAL 4 TIMES DAILY PRN
Qty: 120 ML | Refills: 0 | Status: SHIPPED | OUTPATIENT
Start: 2025-02-07

## 2025-02-07 ASSESSMENT — ENCOUNTER SYMPTOMS
DIARRHEA: 0
WEIGHT LOSS: 0
VOMITING: 0
EYE REDNESS: 0
NAUSEA: 0
MYALGIAS: 1
HEADACHES: 0
EYE DISCHARGE: 0

## 2025-02-07 ASSESSMENT — FIBROSIS 4 INDEX: FIB4 SCORE: 2.09

## 2025-02-07 NOTE — PROGRESS NOTES
"Subjective     Agatha Fritz is a 69 y.o. female who presents with Cough (Pt has a cough, fever, chills x 6 days )            6 days mostly nonproductive cough. Chest congestion. Hears crackles and wheezing. Waxing and waning fever. Mild SOB. +PMH asthma using rescue inhaler with minimal improvement. No PMH pneumonia. Sinus pressure/drainage. PSxH septoturbinoplasty No other aggravating or alleviating factors.          Review of Systems   Constitutional:  Positive for malaise/fatigue. Negative for weight loss.   Eyes:  Negative for discharge and redness.   Gastrointestinal:  Negative for diarrhea, nausea and vomiting.   Musculoskeletal:  Positive for myalgias. Negative for joint pain.   Skin:  Negative for itching and rash.   Neurological:  Negative for headaches.              Objective     /48 (BP Location: Left arm, Patient Position: Sitting, BP Cuff Size: Adult)   Pulse 84   Temp 36.5 °C (97.7 °F) (Temporal)   Resp 12   Ht 1.626 m (5' 4\")   Wt 72 kg (158 lb 12.8 oz)   LMP  (LMP Unknown)   SpO2 97%   BMI 27.26 kg/m²      Physical Exam  Constitutional:       General: She is not in acute distress.     Appearance: She is well-developed.   HENT:      Head: Normocephalic and atraumatic.      Right Ear: Tympanic membrane normal.      Left Ear: Tympanic membrane normal.      Nose: Congestion present.      Mouth/Throat:      Mouth: Mucous membranes are moist.      Pharynx: No posterior oropharyngeal erythema.      Comments: PND  Eyes:      Conjunctiva/sclera: Conjunctivae normal.   Cardiovascular:      Rate and Rhythm: Normal rate and regular rhythm.      Heart sounds: Normal heart sounds. No murmur heard.  Pulmonary:      Effort: Pulmonary effort is normal.      Breath sounds: Normal breath sounds. No wheezing.   Skin:     General: Skin is warm and dry.      Findings: No rash.   Neurological:      Mental Status: She is alert.                             Assessment & Plan   CXR: no acute " cardiopulmonary process per radiology       1. Acute cough  DX-CHEST-2 VIEWS    promethazine-dextromethorphan (PROMETHAZINE-DM) 6.25-15 MG/5ML syrup      2. Fever, unspecified fever cause  DX-CHEST-2 VIEWS      3. Rhinosinusitis  amoxicillin-clavulanate (AUGMENTIN) 875-125 MG Tab      4. Mild intermittent asthma with exacerbation  methylPREDNISolone (MEDROL DOSEPAK) 4 MG Tablet Therapy Pack        Differential diagnosis, natural history, supportive care, and indications for immediate follow-up were discussed.

## 2025-02-11 ENCOUNTER — OFFICE VISIT (OUTPATIENT)
Dept: MEDICAL GROUP | Facility: MEDICAL CENTER | Age: 70
End: 2025-02-11
Payer: MEDICARE

## 2025-02-11 VITALS
HEART RATE: 83 BPM | TEMPERATURE: 97.1 F | DIASTOLIC BLOOD PRESSURE: 72 MMHG | OXYGEN SATURATION: 97 % | HEIGHT: 64 IN | WEIGHT: 154.4 LBS | BODY MASS INDEX: 26.36 KG/M2 | SYSTOLIC BLOOD PRESSURE: 128 MMHG

## 2025-02-11 DIAGNOSIS — N94.10 FEMALE DYSPAREUNIA: ICD-10-CM

## 2025-02-11 DIAGNOSIS — J45.20 MILD INTERMITTENT ASTHMA WITHOUT COMPLICATION: ICD-10-CM

## 2025-02-11 DIAGNOSIS — E78.5 DYSLIPIDEMIA: ICD-10-CM

## 2025-02-11 DIAGNOSIS — B96.89 BACTERIAL CONJUNCTIVITIS OF BOTH EYES: ICD-10-CM

## 2025-02-11 DIAGNOSIS — H10.9 BACTERIAL CONJUNCTIVITIS OF BOTH EYES: ICD-10-CM

## 2025-02-11 DIAGNOSIS — I10 ESSENTIAL HYPERTENSION: ICD-10-CM

## 2025-02-11 DIAGNOSIS — F33.41 RECURRENT MAJOR DEPRESSIVE DISORDER, IN PARTIAL REMISSION (HCC): ICD-10-CM

## 2025-02-11 PROBLEM — G89.18 POST-OP PAIN: Status: RESOLVED | Noted: 2019-12-18 | Resolved: 2025-02-11

## 2025-02-11 PROBLEM — J34.2 DEVIATED NASAL SEPTUM: Status: RESOLVED | Noted: 2019-08-14 | Resolved: 2025-02-11

## 2025-02-11 PROCEDURE — 99214 OFFICE O/P EST MOD 30 MIN: CPT | Performed by: STUDENT IN AN ORGANIZED HEALTH CARE EDUCATION/TRAINING PROGRAM

## 2025-02-11 PROCEDURE — 3078F DIAST BP <80 MM HG: CPT | Performed by: STUDENT IN AN ORGANIZED HEALTH CARE EDUCATION/TRAINING PROGRAM

## 2025-02-11 PROCEDURE — 3074F SYST BP LT 130 MM HG: CPT | Performed by: STUDENT IN AN ORGANIZED HEALTH CARE EDUCATION/TRAINING PROGRAM

## 2025-02-11 RX ORDER — TRIAMCINOLONE ACETONIDE 5 MG/G
OINTMENT TOPICAL
COMMUNITY

## 2025-02-11 RX ORDER — OXYCODONE AND ACETAMINOPHEN 5; 325 MG/1; MG/1
TABLET ORAL
COMMUNITY
End: 2025-02-11

## 2025-02-11 RX ORDER — LOSARTAN POTASSIUM 50 MG/1
TABLET ORAL
COMMUNITY
End: 2025-02-11

## 2025-02-11 RX ORDER — PROMETHAZINE HYDROCHLORIDE, PHENYLEPHRINE HYDROCHLORIDE AND CODEINE PHOSPHATE 6.25; 5; 1 MG/5ML; MG/5ML; MG/5ML
SOLUTION ORAL
COMMUNITY
End: 2025-02-11

## 2025-02-11 RX ORDER — HYDROCODONE BITARTRATE AND ACETAMINOPHEN 10; 325 MG/1; MG/1
TABLET ORAL
COMMUNITY
End: 2025-02-11

## 2025-02-11 RX ORDER — IPRATROPIUM BROMIDE AND ALBUTEROL 20; 100 UG/1; UG/1
SPRAY, METERED RESPIRATORY (INHALATION)
COMMUNITY
End: 2025-02-11

## 2025-02-11 RX ORDER — ENALAPRIL MALEATE 20 MG/1
TABLET ORAL
COMMUNITY
End: 2025-02-11

## 2025-02-11 RX ORDER — POLYMYXIN B SULFATE AND TRIMETHOPRIM 1; 10000 MG/ML; [USP'U]/ML
1 SOLUTION OPHTHALMIC EVERY 4 HOURS
Qty: 10 ML | Refills: 0 | Status: SHIPPED | OUTPATIENT
Start: 2025-02-11

## 2025-02-11 RX ORDER — FLUOXETINE 10 MG/1
TABLET ORAL
COMMUNITY
End: 2025-02-11

## 2025-02-11 RX ORDER — SIMVASTATIN 20 MG
TABLET ORAL
COMMUNITY
End: 2025-02-11

## 2025-02-11 RX ORDER — OXYCODONE HYDROCHLORIDE 5 MG/1
TABLET ORAL
COMMUNITY
End: 2025-02-11

## 2025-02-11 RX ORDER — TRAMADOL HYDROCHLORIDE 50 MG/1
1 TABLET ORAL EVERY 6 HOURS
COMMUNITY
End: 2025-02-11

## 2025-02-11 RX ORDER — DICLOFENAC SODIUM 20 MG/G
SOLUTION TOPICAL
COMMUNITY
End: 2025-02-11

## 2025-02-11 RX ORDER — NAPROXEN SODIUM 220 MG/1
TABLET, FILM COATED ORAL
COMMUNITY

## 2025-02-11 RX ORDER — HYDROCODONE BITARTRATE AND ACETAMINOPHEN 5; 325 MG/1; MG/1
TABLET ORAL
COMMUNITY
End: 2025-02-11

## 2025-02-11 ASSESSMENT — ENCOUNTER SYMPTOMS
PALPITATIONS: 0
FEVER: 0
DIZZINESS: 0
CHILLS: 0
HEADACHES: 0
VOMITING: 0
WEIGHT LOSS: 0
WHEEZING: 0
SHORTNESS OF BREATH: 0
NAUSEA: 0

## 2025-02-11 ASSESSMENT — FIBROSIS 4 INDEX: FIB4 SCORE: 2.09

## 2025-02-11 ASSESSMENT — PATIENT HEALTH QUESTIONNAIRE - PHQ9: CLINICAL INTERPRETATION OF PHQ2 SCORE: 0

## 2025-02-11 NOTE — PROGRESS NOTES
Subjective:     CC:     HPI:   Agatha presents today with    PMH HTN, HLD, asthma, MDD, insomnia on trazodone as needed  Specialists  [facility] - [facility] - s/p TKA L    HLD on atorvastatin 10mg daily  [facility]) will see  HTN on amlodipine and telmisartan, hctz 12.5mg daily     MDD - fluoxetine 10mg daily. Fhx mood do       Verbal consent was acquired by the patient to use [employer] ambient listening note generation during this visit Yes   History of Present Illness  The patient is a 69-year-old female who presents for evaluation of hypertension, dyslipidemia, asthma, depression, insomnia, dyspareunia, and bilateral conjunctivitis.    She has a history of hypertension and is currently on amlodipine and telmisartan for blood pressure management. She reports no chest pain or bowel issues.    She has a history of dyslipidemia and is on atorvastatin 10 mg for cholesterol management. Her recent lab results have been satisfactory.    She has a history of asthma and uses albuterol as needed. She does not use Combivent or Dulera.    She has a history of depression, which is well controlled with fluoxetine 10 mg. She recently refilled her fluoxetine prescription.    She has a history of insomnia and takes trazodone as needed.    She experiences dyspareunia and uses K-Y lubricant. She was previously prescribed intravaginal estrogen cream but discontinued it due to breast tenderness.    She reports experiencing symptoms similar to pink eye, with both eyes crusted shut upon waking. This started approximately 2 days ago, 10 days into an upper respiratory infection. She sought care at an urgent care facility on Friday, where a chest x-ray ruled out pneumonia. The condition initially affected her left eye before progressing to the right. She reports no changes in vision but notes slight blurriness due to the discharge, which is green and mucopurulent. She is not allergic to any antibiotics.          Health Maintenance:  "    ROS:  Review of Systems   Constitutional:  Negative for chills, fever and weight loss.   HENT:  Negative for hearing loss.    Respiratory:  Negative for shortness of breath and wheezing.    Cardiovascular:  Negative for chest pain and palpitations.   Gastrointestinal:  Negative for nausea and vomiting.   Genitourinary:  Negative for frequency and urgency.   Skin:  Negative for rash.   Neurological:  Negative for dizziness and headaches.       Objective:     Exam:  /72   Pulse 83   Temp 36.2 °C (97.1 °F) (Temporal)   Ht 1.626 m (5' 4\")   Wt 70 kg (154 lb 6.4 oz)   LMP  (LMP Unknown)   SpO2 97%   BMI 26.50 kg/m²  Body mass index is 26.5 kg/m².    Physical Exam  Constitutional:       Appearance: Normal appearance.   Cardiovascular:      Rate and Rhythm: Normal rate and regular rhythm.      Heart sounds: No murmur heard.  Pulmonary:      Effort: Pulmonary effort is normal.      Breath sounds: Normal breath sounds. No wheezing.   Musculoskeletal:      Cervical back: Normal range of motion and neck supple.   Lymphadenopathy:      Cervical: No cervical adenopathy.   Neurological:      Mental Status: She is alert.         Labs:     Assessment & Plan:     69 y.o. female with the following -     1. Essential hypertension  Chronic well-controlled on amlodipine 5 mg, hydrochlorothiazide 12.5 mg and telmisartan 40 mg daily taking without any issues    2. Dyslipidemia  Chronic, stable on atorvastatin 10 mg daily taking without any issues    3. Recurrent major depressive disorder, in partial remission (HCC)  Chronic, stable  On fluoxetine 10 mg daily taken without any issue.  Report there is family history of mood disorders/SI and she would like to continue this medication    4. Mild intermittent asthma without complication  Chronic, stable on albuterol as needed    5. Bacterial conjunctivitis of both eyes  Acute, stable recently diagnosed with URI at urgent care was prescribed Augmentin.  Report overall symptom " has been stable has not improved or gotten worse.  Noted 2 days ago that he is she is waking up with thick purulent mucus drainage out of both eyes  She denies any drug allergies  - polymixin-trimethoprim (POLYTRIM) 98566-9.1 UNIT/ML-% Solution; Administer 1 Drop into both eyes every 4 hours.  Dispense: 10 mL; Refill: 0    6. Female dyspareunia  Lubricant as needed previously had adverse side effect with vaginal estrogen          Return in about 6 months (around 8/11/2025) for Lab review, Med check.    Please note that this dictation was created using voice recognition software. I have made every reasonable attempt to correct obvious errors, but I expect that there are errors of grammar and possibly content that I did not discover before finalizing the note.

## 2025-07-23 DIAGNOSIS — F51.04 CHRONIC INSOMNIA: ICD-10-CM

## 2025-07-24 RX ORDER — TRAZODONE HYDROCHLORIDE 50 MG/1
50 TABLET ORAL
Qty: 90 TABLET | Refills: 0 | Status: SHIPPED | OUTPATIENT
Start: 2025-07-24

## 2025-08-03 DIAGNOSIS — I10 ESSENTIAL HYPERTENSION: ICD-10-CM

## 2025-08-03 DIAGNOSIS — E78.5 DYSLIPIDEMIA: ICD-10-CM

## 2025-08-04 RX ORDER — HYDROCHLOROTHIAZIDE 12.5 MG/1
12.5 TABLET ORAL DAILY
Qty: 90 TABLET | Refills: 3 | Status: SHIPPED | OUTPATIENT
Start: 2025-08-04

## 2025-08-04 RX ORDER — ATORVASTATIN CALCIUM 10 MG/1
TABLET, FILM COATED ORAL
Qty: 90 TABLET | Refills: 3 | Status: SHIPPED | OUTPATIENT
Start: 2025-08-04

## (undated) DEVICE — PROTECTOR ULNA NERVE - (36PR/CA)

## (undated) DEVICE — PAD PREP 24 X 48 CUFFED - (100/CA)

## (undated) DEVICE — SENSOR SPO2 NEO LNCS ADHESIVE (20/BX) SEE USER NOTES

## (undated) DEVICE — CORDS BIPOLAR COAGULATION - 12FT STERILE DISP. (10EA/BX)

## (undated) DEVICE — TUBING C&T SET FLYING LEADS DRAIN TUBING (10EA/BX)

## (undated) DEVICE — SUTURE GENERAL

## (undated) DEVICE — CANISTER SUCTION 3000ML MECHANICAL FILTER AUTO SHUTOFF MEDI-VAC NONSTERILE LF DISP  (40EA/CA)

## (undated) DEVICE — BLADE SURGICAL CLIPPER - (50EA/CA)

## (undated) DEVICE — MEDICINE CUP STERILE 2 OZ - (100/CA)

## (undated) DEVICE — SODIUM CHL. INJ. 0.9% 500ML (24EA/CA 50CA/PF)

## (undated) DEVICE — HEAD HOLDER JUNIOR/ADULT

## (undated) DEVICE — PATTIES SURG X-RAYCOTTONOID - 1/2 X 3 IN (200/CA)

## (undated) DEVICE — BOVIE FOOT CONTROL SUCTION - 6IN 10FR (25EA/CA)

## (undated) DEVICE — GOWN WARMING STANDARD FLEX - (30/CA)

## (undated) DEVICE — LACTATED RINGERS INJ 1000 ML - (14EA/CA 60CA/PF)

## (undated) DEVICE — KIT ANESTHESIA W/CIRCUIT & 3/LT BAG W/FILTER (20EA/CA)

## (undated) DEVICE — BANDAGE ROLL STERILE BULKEE 4.5 IN X 4 YD (100EA/CA)

## (undated) DEVICE — CATHETER IV 20 GA X 1-1/4 ---SURG.& SDS ONLY--- (50EA/BX)

## (undated) DEVICE — GLOVE BIOGEL SZ 7 SURGICAL PF LTX - (50PR/BX 4BX/CA)

## (undated) DEVICE — NEPTUNE 4 PORT MANIFOLD - (20/PK)

## (undated) DEVICE — CANISTER SUCTION RIGID RED 1500CC (40EA/CA)

## (undated) DEVICE — TUBING CLEARLINK DUO-VENT - C-FLO (48EA/CA)

## (undated) DEVICE — SET LEADWIRE 5 LEAD BEDSIDE DISPOSABLE ECG (1SET OF 5/EA)

## (undated) DEVICE — SET EXTENSION WITH 2 PORTS (48EA/CA) ***PART #2C8610 IS A SUBSTITUTE*****

## (undated) DEVICE — PACK LOWER EXTREMITY - (2/CA)

## (undated) DEVICE — SYRINGE DISP. 12 CC LL - (100/BX)

## (undated) DEVICE — KIT ROOM DECONTAMINATION

## (undated) DEVICE — ANTI-FOG SOLUTION - 60BTL/CA

## (undated) DEVICE — GLOVE BIOGEL PI ORTHO SZ 6 SURGICAL PF LF (40PR/BX)

## (undated) DEVICE — KIT  I.V. START (100EA/CA)

## (undated) DEVICE — SUCTION INSTRUMENT YANKAUER BULBOUS TIP W/O VENT (50EA/CA)

## (undated) DEVICE — SUTURE 4-0 ETHILON FS-2 18 (36PK/BX)"

## (undated) DEVICE — MASK ANESTHESIA ADULT  - (100/CA)

## (undated) DEVICE — SYRINGE 10 ML CONTROL LL (25EA/BX 4BX/CA)

## (undated) DEVICE — SPLINT NASAL DOYLE AIRWAY (2EA/ST)

## (undated) DEVICE — TUBE CONNECTING SUCTION - CLEAR PLASTIC STERILE 72 IN (50EA/CA)

## (undated) DEVICE — GLOVE BIOGEL PI INDICATOR SZ 8.5 SURGICAL PF LF - (50PR/BX 4BX/CA)

## (undated) DEVICE — BANDAGE ELASTIC 3 X 5 YDS - STERILE VELCRO (25/CA)LATEX

## (undated) DEVICE — GLOVE BIOGEL SZ 8.5 SURGICAL PF LTX - (50PR/BX 4BX/CA)

## (undated) DEVICE — SOLUTION 10%PVP-IODINE 8OZ - (24/CA)

## (undated) DEVICE — SPONGE GAUZESTER 4 X 4 4PLY - (128PK/CA)

## (undated) DEVICE — PACK ENT OR - (2EA/CA)

## (undated) DEVICE — SUTURE 3-0 ETHILON FS-1 - (36/BX) 30 INCH

## (undated) DEVICE — NEEDLE NON SAFETY HYPO 22 GA X 1 1/2 IN (100/BX)

## (undated) DEVICE — SODIUM CHL IRRIGATION 0.9% 1000ML (12EA/CA)

## (undated) DEVICE — ELECTRODE 850 FOAM ADHESIVE - HYDROGEL RADIOTRNSPRNT (50/PK)

## (undated) DEVICE — GLOVE BIOGEL SZ 6.5 SURGICAL PF LTX (50PR/BX 4BX/CA)

## (undated) DEVICE — WATER IRRIGATION STERILE 1000ML (12EA/CA)

## (undated) DEVICE — SLEEVE, VASO, THIGH, MED

## (undated) DEVICE — GLOVE BIOGEL INDICATOR SZ 7SURGICAL PF LTX - (50/BX 4BX/CA)

## (undated) DEVICE — GLOVE SZ 6.5 BIOGEL PI MICRO - PF LF (50PR/BX)

## (undated) DEVICE — SUTURE 4-0 CHROMIC FS-2 (36EA/BX)

## (undated) DEVICE — TUBE E-T HI-LO CUFF 7.0MM (10EA/PK)